# Patient Record
Sex: FEMALE | Race: WHITE | Employment: OTHER | URBAN - METROPOLITAN AREA
[De-identification: names, ages, dates, MRNs, and addresses within clinical notes are randomized per-mention and may not be internally consistent; named-entity substitution may affect disease eponyms.]

---

## 2019-01-24 PROBLEM — N18.4 STAGE 4 CHRONIC KIDNEY DISEASE (HCC): Status: ACTIVE | Noted: 2019-01-24

## 2019-01-24 PROBLEM — E66.01 SEVERE OBESITY (HCC): Status: ACTIVE | Noted: 2019-01-24

## 2019-01-28 ENCOUNTER — HOSPITAL ENCOUNTER (OUTPATIENT)
Dept: SURGERY | Age: 84
Discharge: HOME | End: 2019-01-28
Payer: MEDICARE

## 2019-01-28 VITALS
HEIGHT: 61 IN | TEMPERATURE: 98.1 F | DIASTOLIC BLOOD PRESSURE: 57 MMHG | OXYGEN SATURATION: 97 % | BODY MASS INDEX: 46.27 KG/M2 | SYSTOLIC BLOOD PRESSURE: 156 MMHG | RESPIRATION RATE: 18 BRPM | HEART RATE: 85 BPM | WEIGHT: 245.06 LBS

## 2019-01-28 LAB
ATRIAL RATE: 78 BPM
CALCULATED P AXIS, ECG09: 33 DEGREES
CALCULATED R AXIS, ECG10: 57 DEGREES
CALCULATED T AXIS, ECG11: 67 DEGREES
CREAT SERPL-MCNC: 3.33 MG/DL (ref 0.6–1)
DIAGNOSIS, 93000: NORMAL
GLUCOSE BLD STRIP.AUTO-MCNC: 95 MG/DL (ref 65–100)
HGB BLD-MCNC: 8.9 G/DL (ref 11.7–15.4)
P-R INTERVAL, ECG05: 168 MS
POTASSIUM SERPL-SCNC: 3.4 MMOL/L (ref 3.5–5.1)
Q-T INTERVAL, ECG07: 440 MS
QRS DURATION, ECG06: 94 MS
QTC CALCULATION (BEZET), ECG08: 501 MS
VENTRICULAR RATE, ECG03: 78 BPM

## 2019-01-28 PROCEDURE — 93005 ELECTROCARDIOGRAM TRACING: CPT | Performed by: ANESTHESIOLOGY

## 2019-01-28 PROCEDURE — 85018 HEMOGLOBIN: CPT

## 2019-01-28 PROCEDURE — 82565 ASSAY OF CREATININE: CPT

## 2019-01-28 PROCEDURE — 82962 GLUCOSE BLOOD TEST: CPT

## 2019-01-28 PROCEDURE — 84132 ASSAY OF SERUM POTASSIUM: CPT

## 2019-01-28 NOTE — PERIOP NOTES
Lab results reviewed and routed to surgeon per anesthesia protocol. Recent Results (from the past 12 hour(s)) GLUCOSE, POC Collection Time: 01/28/19 11:37 AM  
Result Value Ref Range Glucose (POC) 95 65 - 100 mg/dL POTASSIUM Collection Time: 01/28/19 11:41 AM  
Result Value Ref Range Potassium 3.4 (L) 3.5 - 5.1 mmol/L  
HEMOGLOBIN Collection Time: 01/28/19 11:41 AM  
Result Value Ref Range HGB 8.9 (L) 11.7 - 15.4 g/dL CREATININE Collection Time: 01/28/19 11:41 AM  
Result Value Ref Range  Creatinine 3.33 (H) 0.6 - 1.0 MG/DL

## 2019-01-28 NOTE — PERIOP NOTES
Patient verified name and . Patient provided medical/health information and PTA medications to the best of their ability. TYPE  CASE:ll 
Order for consent  found in EHR and matches case posting. Labs per surgeon:None. Labs per anesthesia protocol: Potassium, Creatinine, HGB, POC Glucose. EKG  :  Today NSR Poc Glucose: 95 Patient provided with and instructed on education handouts including Guide to Surgery, blood transfusions, pain management, and hand hygiene for the family and community, and [facility] brochure. Antibacterial soap and Hibiclens instructions given per hospital policy. Instructed patient to continue previous medications as prescribed prior to surgery unless otherwise directed and to take the following medications the day of surgery according to anesthesia guidelines : Albuterol inhaler, ASA 81 mg, Zyrtec, Breo inhaler, Ativan, Singulair, Omeprazole, Incruse inhaler . Instructed patient to hold  the following medications: Vitamins. Patient states and also brought paper showing to hold Eliquis x 3 days starting 2019 in chart. Original medication prescription bottles were not visualized during patient appointment. Patient teach back successful and patient demonstrates knowledge of instruction.

## 2019-01-29 NOTE — PERIOP NOTES
Left voice mail message for [facility] at Dr. Rani Frazier office to follow up for request for Eliquis hold note.

## 2019-01-29 NOTE — PERIOP NOTES
Received faxed copy of Eliquis hold note from [facility] at Dr. Rani Frazier office. Placed on chart. She will try to obtain more legible note.

## 2019-01-30 ENCOUNTER — ANESTHESIA EVENT (OUTPATIENT)
Dept: SURGERY | Age: 84
End: 2019-01-30
Payer: MEDICARE

## 2019-01-31 ENCOUNTER — HOSPITAL ENCOUNTER (OUTPATIENT)
Age: 84
Setting detail: OUTPATIENT SURGERY
Discharge: HOME | End: 2019-01-31
Attending: SURGERY | Admitting: SURGERY
Payer: MEDICARE

## 2019-01-31 ENCOUNTER — ANESTHESIA (OUTPATIENT)
Dept: SURGERY | Age: 84
End: 2019-01-31
Payer: MEDICARE

## 2019-01-31 VITALS
HEIGHT: 61 IN | OXYGEN SATURATION: 90 % | DIASTOLIC BLOOD PRESSURE: 62 MMHG | TEMPERATURE: 97.3 F | BODY MASS INDEX: 46.26 KG/M2 | SYSTOLIC BLOOD PRESSURE: 139 MMHG | HEART RATE: 79 BPM | WEIGHT: 245 LBS | RESPIRATION RATE: 14 BRPM

## 2019-01-31 LAB
GLUCOSE BLD STRIP.AUTO-MCNC: 125 MG/DL (ref 65–100)
POTASSIUM BLD-SCNC: 3.5 MMOL/L (ref 3.5–5.1)

## 2019-01-31 PROCEDURE — 77030010509 HC AIRWY LMA MSK TELE -A: Performed by: ANESTHESIOLOGY

## 2019-01-31 PROCEDURE — 76210000006 HC OR PH I REC 0.5 TO 1 HR: Performed by: SURGERY

## 2019-01-31 PROCEDURE — 74011250637 HC RX REV CODE- 250/637: Performed by: ANESTHESIOLOGY

## 2019-01-31 PROCEDURE — 74011250636 HC RX REV CODE- 250/636

## 2019-01-31 PROCEDURE — 77030002996 HC SUT SLK J&J -A: Performed by: SURGERY

## 2019-01-31 PROCEDURE — 76010000222 HC CV SURG 2 TO 2.5 HR INTENSV-TIER 1: Performed by: SURGERY

## 2019-01-31 PROCEDURE — 76210000021 HC REC RM PH II 0.5 TO 1 HR: Performed by: SURGERY

## 2019-01-31 PROCEDURE — 77030031139 HC SUT VCRL2 J&J -A: Performed by: SURGERY

## 2019-01-31 PROCEDURE — 84132 ASSAY OF SERUM POTASSIUM: CPT

## 2019-01-31 PROCEDURE — 74011250636 HC RX REV CODE- 250/636: Performed by: ANESTHESIOLOGY

## 2019-01-31 PROCEDURE — C1768 GRAFT, VASCULAR: HCPCS | Performed by: SURGERY

## 2019-01-31 PROCEDURE — 82962 GLUCOSE BLOOD TEST: CPT

## 2019-01-31 PROCEDURE — 77030034888 HC SUT PROL 2 J&J -B: Performed by: SURGERY

## 2019-01-31 PROCEDURE — 74011250636 HC RX REV CODE- 250/636: Performed by: SURGERY

## 2019-01-31 PROCEDURE — 76060000035 HC ANESTHESIA 2 TO 2.5 HR: Performed by: SURGERY

## 2019-01-31 PROCEDURE — 77030018673: Performed by: SURGERY

## 2019-01-31 PROCEDURE — 77030002987 HC SUT PROL J&J -B: Performed by: SURGERY

## 2019-01-31 DEVICE — VG 6MM X 40CM LINED
Type: IMPLANTABLE DEVICE | Site: ARM | Status: FUNCTIONAL
Brand: GORE-TEX   VASCULAR GRAFT

## 2019-01-31 RX ORDER — HEPARIN SODIUM 1000 [USP'U]/ML
INJECTION, SOLUTION INTRAVENOUS; SUBCUTANEOUS AS NEEDED
Status: DISCONTINUED | OUTPATIENT
Start: 2019-01-31 | End: 2019-01-31 | Stop reason: HOSPADM

## 2019-01-31 RX ORDER — HEPARIN SODIUM 5000 [USP'U]/ML
INJECTION, SOLUTION INTRAVENOUS; SUBCUTANEOUS AS NEEDED
Status: DISCONTINUED | OUTPATIENT
Start: 2019-01-31 | End: 2019-01-31 | Stop reason: HOSPADM

## 2019-01-31 RX ORDER — SODIUM CHLORIDE 0.9 % (FLUSH) 0.9 %
5-40 SYRINGE (ML) INJECTION EVERY 8 HOURS
Status: DISCONTINUED | OUTPATIENT
Start: 2019-01-31 | End: 2019-01-31 | Stop reason: HOSPADM

## 2019-01-31 RX ORDER — OXYCODONE HYDROCHLORIDE 5 MG/1
5 TABLET ORAL
Status: DISCONTINUED | OUTPATIENT
Start: 2019-01-31 | End: 2019-01-31 | Stop reason: HOSPADM

## 2019-01-31 RX ORDER — PROPOFOL 10 MG/ML
INJECTION, EMULSION INTRAVENOUS AS NEEDED
Status: DISCONTINUED | OUTPATIENT
Start: 2019-01-31 | End: 2019-01-31 | Stop reason: HOSPADM

## 2019-01-31 RX ORDER — SODIUM CHLORIDE, SODIUM LACTATE, POTASSIUM CHLORIDE, CALCIUM CHLORIDE 600; 310; 30; 20 MG/100ML; MG/100ML; MG/100ML; MG/100ML
75 INJECTION, SOLUTION INTRAVENOUS CONTINUOUS
Status: DISCONTINUED | OUTPATIENT
Start: 2019-01-31 | End: 2019-01-31 | Stop reason: HOSPADM

## 2019-01-31 RX ORDER — FENTANYL CITRATE 50 UG/ML
INJECTION, SOLUTION INTRAMUSCULAR; INTRAVENOUS AS NEEDED
Status: DISCONTINUED | OUTPATIENT
Start: 2019-01-31 | End: 2019-01-31 | Stop reason: HOSPADM

## 2019-01-31 RX ORDER — SODIUM CHLORIDE 9 MG/ML
INJECTION, SOLUTION INTRAVENOUS
Status: DISCONTINUED | OUTPATIENT
Start: 2019-01-31 | End: 2019-01-31 | Stop reason: HOSPADM

## 2019-01-31 RX ORDER — OXYCODONE AND ACETAMINOPHEN 10; 325 MG/1; MG/1
1 TABLET ORAL AS NEEDED
Status: DISCONTINUED | OUTPATIENT
Start: 2019-01-31 | End: 2019-01-31 | Stop reason: HOSPADM

## 2019-01-31 RX ORDER — PROTAMINE SULFATE 10 MG/ML
INJECTION, SOLUTION INTRAVENOUS AS NEEDED
Status: DISCONTINUED | OUTPATIENT
Start: 2019-01-31 | End: 2019-01-31 | Stop reason: HOSPADM

## 2019-01-31 RX ORDER — SODIUM CHLORIDE 0.9 % (FLUSH) 0.9 %
5-40 SYRINGE (ML) INJECTION AS NEEDED
Status: DISCONTINUED | OUTPATIENT
Start: 2019-01-31 | End: 2019-01-31 | Stop reason: HOSPADM

## 2019-01-31 RX ORDER — LIDOCAINE HYDROCHLORIDE 20 MG/ML
INJECTION, SOLUTION EPIDURAL; INFILTRATION; INTRACAUDAL; PERINEURAL AS NEEDED
Status: DISCONTINUED | OUTPATIENT
Start: 2019-01-31 | End: 2019-01-31 | Stop reason: HOSPADM

## 2019-01-31 RX ORDER — ACETAMINOPHEN 500 MG
1000 TABLET ORAL ONCE
Status: COMPLETED | OUTPATIENT
Start: 2019-01-31 | End: 2019-01-31

## 2019-01-31 RX ORDER — ONDANSETRON 2 MG/ML
INJECTION INTRAMUSCULAR; INTRAVENOUS AS NEEDED
Status: DISCONTINUED | OUTPATIENT
Start: 2019-01-31 | End: 2019-01-31 | Stop reason: HOSPADM

## 2019-01-31 RX ORDER — HYDROMORPHONE HYDROCHLORIDE 2 MG/ML
0.5 INJECTION, SOLUTION INTRAMUSCULAR; INTRAVENOUS; SUBCUTANEOUS
Status: DISCONTINUED | OUTPATIENT
Start: 2019-01-31 | End: 2019-01-31 | Stop reason: HOSPADM

## 2019-01-31 RX ORDER — TRAMADOL HYDROCHLORIDE 50 MG/1
50 TABLET ORAL
Status: DISCONTINUED | OUTPATIENT
Start: 2019-01-31 | End: 2019-01-31 | Stop reason: HOSPADM

## 2019-01-31 RX ORDER — PROPOFOL 10 MG/ML
INJECTION, EMULSION INTRAVENOUS
Status: DISCONTINUED | OUTPATIENT
Start: 2019-01-31 | End: 2019-01-31 | Stop reason: HOSPADM

## 2019-01-31 RX ORDER — CEFAZOLIN SODIUM/WATER 2 G/20 ML
2 SYRINGE (ML) INTRAVENOUS ONCE
Status: COMPLETED | OUTPATIENT
Start: 2019-01-31 | End: 2019-01-31

## 2019-01-31 RX ADMIN — PROTAMINE SULFATE 10 MG: 10 INJECTION, SOLUTION INTRAVENOUS at 15:12

## 2019-01-31 RX ADMIN — SODIUM CHLORIDE, SODIUM LACTATE, POTASSIUM CHLORIDE, AND CALCIUM CHLORIDE 75 ML/HR: 600; 310; 30; 20 INJECTION, SOLUTION INTRAVENOUS at 10:56

## 2019-01-31 RX ADMIN — PROPOFOL 20 MG: 10 INJECTION, EMULSION INTRAVENOUS at 14:26

## 2019-01-31 RX ADMIN — ACETAMINOPHEN 1000 MG: 500 TABLET, FILM COATED ORAL at 17:15

## 2019-01-31 RX ADMIN — FENTANYL CITRATE 12.5 MCG: 50 INJECTION, SOLUTION INTRAMUSCULAR; INTRAVENOUS at 14:10

## 2019-01-31 RX ADMIN — FENTANYL CITRATE 25 MCG: 50 INJECTION, SOLUTION INTRAMUSCULAR; INTRAVENOUS at 14:53

## 2019-01-31 RX ADMIN — SODIUM CHLORIDE: 9 INJECTION, SOLUTION INTRAVENOUS at 14:30

## 2019-01-31 RX ADMIN — PROPOFOL 140 MCG/KG/MIN: 10 INJECTION, EMULSION INTRAVENOUS at 13:35

## 2019-01-31 RX ADMIN — FENTANYL CITRATE 25 MCG: 50 INJECTION, SOLUTION INTRAMUSCULAR; INTRAVENOUS at 15:06

## 2019-01-31 RX ADMIN — HEPARIN SODIUM 4000 UNITS: 1000 INJECTION, SOLUTION INTRAVENOUS; SUBCUTANEOUS at 14:33

## 2019-01-31 RX ADMIN — FENTANYL CITRATE 12.5 MCG: 50 INJECTION, SOLUTION INTRAMUSCULAR; INTRAVENOUS at 14:26

## 2019-01-31 RX ADMIN — PROPOFOL 30 MG: 10 INJECTION, EMULSION INTRAVENOUS at 13:35

## 2019-01-31 RX ADMIN — FENTANYL CITRATE 12.5 MCG: 50 INJECTION, SOLUTION INTRAMUSCULAR; INTRAVENOUS at 14:08

## 2019-01-31 RX ADMIN — PROPOFOL 20 MG: 10 INJECTION, EMULSION INTRAVENOUS at 13:53

## 2019-01-31 RX ADMIN — ONDANSETRON 4 MG: 2 INJECTION INTRAMUSCULAR; INTRAVENOUS at 14:15

## 2019-01-31 RX ADMIN — LIDOCAINE HYDROCHLORIDE 40 MG: 20 INJECTION, SOLUTION EPIDURAL; INFILTRATION; INTRACAUDAL; PERINEURAL at 13:35

## 2019-01-31 RX ADMIN — Medication 2 G: at 13:24

## 2019-01-31 RX ADMIN — PROTAMINE SULFATE 10 MG: 10 INJECTION, SOLUTION INTRAVENOUS at 15:14

## 2019-01-31 NOTE — ANESTHESIA POSTPROCEDURE EVALUATION
Procedure(s): LEFT ARM ARTERIO VENOUS GRAFT INSERTION. Anesthesia Post Evaluation Multimodal analgesia: multimodal analgesia used between 6 hours prior to anesthesia start to PACU discharge Patient location during evaluation: bedside Patient participation: complete - patient participated Level of consciousness: awake and responsive to light touch Pain management: adequate Airway patency: patent Anesthetic complications: no 
Cardiovascular status: acceptable, hemodynamically stable, blood pressure returned to baseline and stable Respiratory status: acceptable, unassisted, spontaneous ventilation and nonlabored ventilation Hydration status: acceptable Post anesthesia nausea and vomiting:  controlled Visit Vitals /77 Pulse 76 Temp 36.6 °C (97.8 °F) Resp 14 Ht 5' 1\" (1.549 m) Wt 111.1 kg (245 lb) SpO2 95% BMI 46.29 kg/m²

## 2019-01-31 NOTE — ANESTHESIA PREPROCEDURE EVALUATION
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 
  
GERD: well controlled Endo/Other Diabetes: type 2 Morbid obesity and arthritis Other Findings Physical Exam 
 
Airway Mallampati: III 
TM Distance: > 6 cm Neck ROM: normal range of motion Mouth opening: Diminished (comment) Cardiovascular Rhythm: regular Dental 
 
Dentition: Full upper dentures and Full lower dentures Pulmonary Abdominal 
GI exam deferred Other Findings Anesthetic Plan ASA: 4 Anesthesia type: total IV anesthesia and general - backup Induction: Intravenous Anesthetic plan and risks discussed with: Patient

## 2019-01-31 NOTE — BRIEF OP NOTE
BRIEF OPERATIVE NOTE Date of Procedure: 1/31/2019 Preoperative Diagnosis: End stage renal disease (Dhara Colder) [N18.6] Postoperative Diagnosis: End stage renal disease (Hdara Colder) [N18.6] Procedure(s): LEFT ARM ARTERIO VENOUS GRAFT INSERTION Surgeon(s) and Role: Delmer Lee, Cortes Willett MD - Primary Surgical Assistant:  
 
Surgical Staff: 
Circ-1: Merline Brave, RN 
Circ-Relief: Denise Gordon RN Scrub Tech-1: Apple Hawkins Scrub Tech-2: Scar Arriaza; Heidi Long Event Time In Time Out Incision Start [phone] Incision Close 1530 Anesthesia: General  
Estimated Blood Loss: 25cc Specimens: * No specimens in log * Findings:   
Complications: None Implants:  
Implant Name Type Inv. Item Serial No.  Lot No. LRB No. Used Action GRAFT VASC N-S STD [id number] -- [employer] - [id number]  GRAFT VASC N-S STD [id number] -- Cristi Burnette [id number]  [employer]  Left 1 Implanted

## 2019-02-01 NOTE — OP NOTES
[facility] REPORT    Jessie Atkinson  MR#: [id number]  : 1935  ACCOUNT #: [de-identified]   DATE OF SERVICE: 2019    PREOPERATIVE DIAGNOSIS:  End-stage renal disease. POSTOPERATIVE DIAGNOSIS:  End-stage renal disease. PROCEDURE PERFORMED:  Left arteriovenous graft. SURGEON:  Bautista Pisano MD     ASSISTANT:  None. ANESTHESIA:  IV sedation plus 1% lidocaine as local.    ESTIMATED BLOOD LOSS:  25 mL. DRAINS:  None. SPECIMENS REMOVED:  None. COMPLICATIONS:  None. IMPLANTS:  See chart. DESCRIPTION OF PROCEDURE:  The patient was brought to the operating room and placed on the operative table in supine position. Following adequate IV sedation and a timeout procedure, the left arm was draped and prepped in sterile fashion circumferentially. A transverse incision was made just below the antecubital fossa in the anterior forearm. The wound was deepened using Bovie to control bleeding. Dissection was carried downward to the level of the median cubital vein, which was found to be of excellent caliber and quality and was encircled with a vessel loop proximally and distally. Next, dissection was carried downward to the level of the aponeurosis, which was incised, exposing the brachial artery at this level. The brachial, ulnar and radial arteries were identified, mobilized and encircled with vessel loops in Clayton fashion. Next, a small counter incision was made on the distal forearm. A 6 mm PTFE graft was then tunneled in a loop fashion in the subcutaneous plane. The patient was then systemically heparinized. Next, the loops around the artery were tightened to occlude flow. A longitudinal arteriotomy was performed. The PTFE graft was then spatulated and sewn into position in end-to-side fashion with running 5-0 Prolene suture.   Following completion of the anastomosis, a clamp was placed on the PTFE graft through the counterincision. The loops around the artery were then released, restoring flow. A palpable radial pulse was noted. Next, the loops around the median cubital vein were tightened to occlude flow. A longitudinal venotomy was performed. The PTFE graft was then trimmed to the appropriate length, spatulated, and sewn into position in end-to-side fashion with running 5-0 Prolene suture. Prior to completion of the anastomosis, the graft was flushed and the native vessels were backbled. The anastomosis then completed and flow was restored. At this point, there was an excellent thrill in the graft. Protamine was administered to reverse heparin effect. Hemostasis was achieved and the wounds were closed in layers with Vicryl suture. Sterile dry dressings were applied. The patient was awakened from anesthesia and transported to the recovery room in stable condition. The patient tolerated the procedure well. No complications. All needle and sponge counts were reported as correct.       Mary Flores MD       Kit Jatinder / PARKER  D: 02/01/2019 09:13     T: 02/01/2019 09:58  JOB #: [id number]

## 2019-02-06 ENCOUNTER — APPOINTMENT (OUTPATIENT)
Dept: GENERAL RADIOLOGY | Age: 84
DRG: 193 | End: 2019-02-06
Attending: EMERGENCY MEDICINE
Payer: MEDICARE

## 2019-02-06 ENCOUNTER — HOSPITAL ENCOUNTER (INPATIENT)
Age: 84
LOS: 2 days | Discharge: HOME HEALTH | DRG: 193 | End: 2019-02-13
Attending: EMERGENCY MEDICINE | Admitting: HOSPITALIST
Payer: MEDICARE

## 2019-02-06 DIAGNOSIS — N18.4 STAGE 4 CHRONIC KIDNEY DISEASE (HCC): ICD-10-CM

## 2019-02-06 DIAGNOSIS — L03.114 CELLULITIS OF LEFT UPPER EXTREMITY: ICD-10-CM

## 2019-02-06 DIAGNOSIS — J44.1 COPD EXACERBATION (HCC): ICD-10-CM

## 2019-02-06 DIAGNOSIS — R91.8 PULMONARY INFILTRATE IN RIGHT LUNG ON CHEST X-RAY: ICD-10-CM

## 2019-02-06 DIAGNOSIS — J96.21 ACUTE ON CHRONIC RESPIRATORY FAILURE WITH HYPOXIA (HCC): ICD-10-CM

## 2019-02-06 DIAGNOSIS — R91.8 RIGHT LOWER LOBE PULMONARY INFILTRATE: ICD-10-CM

## 2019-02-06 DIAGNOSIS — G47.33 OSA (OBSTRUCTIVE SLEEP APNEA): ICD-10-CM

## 2019-02-06 DIAGNOSIS — R09.02 HYPOXEMIA: ICD-10-CM

## 2019-02-06 DIAGNOSIS — E66.01 SEVERE OBESITY (HCC): ICD-10-CM

## 2019-02-06 DIAGNOSIS — R06.02 SOB (SHORTNESS OF BREATH): Primary | ICD-10-CM

## 2019-02-06 DIAGNOSIS — J44.1 ACUTE EXACERBATION OF CHRONIC OBSTRUCTIVE PULMONARY DISEASE (COPD) (HCC): ICD-10-CM

## 2019-02-06 DIAGNOSIS — N18.9 ANEMIA OF RENAL DISEASE: ICD-10-CM

## 2019-02-06 DIAGNOSIS — D63.1 ANEMIA OF RENAL DISEASE: ICD-10-CM

## 2019-02-06 PROBLEM — L03.90 CELLULITIS: Status: ACTIVE | Noted: 2019-02-06

## 2019-02-06 PROBLEM — D64.9 ANEMIA: Status: ACTIVE | Noted: 2019-02-06

## 2019-02-06 PROBLEM — J18.9 COMMUNITY ACQUIRED PNEUMONIA: Status: ACTIVE | Noted: 2019-02-06

## 2019-02-06 LAB
ALBUMIN SERPL-MCNC: 3 G/DL (ref 3.2–4.6)
ALBUMIN/GLOB SERPL: 0.8 {RATIO}
ALP SERPL-CCNC: 82 U/L (ref 50–130)
ALT SERPL-CCNC: 20 U/L (ref 12–65)
ANION GAP SERPL CALC-SCNC: 12 MMOL/L
AST SERPL-CCNC: 19 U/L (ref 15–37)
ATRIAL RATE: 88 BPM
BASOPHILS # BLD: 0 K/UL (ref 0–0.2)
BASOPHILS NFR BLD: 0 % (ref 0–2)
BILIRUB SERPL-MCNC: 0.3 MG/DL (ref 0.2–1.1)
BNP SERPL-MCNC: 510 PG/ML
BUN SERPL-MCNC: 54 MG/DL (ref 8–23)
CALCIUM SERPL-MCNC: 8.3 MG/DL (ref 8.3–10.4)
CALCULATED P AXIS, ECG09: 21 DEGREES
CALCULATED R AXIS, ECG10: 44 DEGREES
CALCULATED T AXIS, ECG11: 78 DEGREES
CHLORIDE SERPL-SCNC: 103 MMOL/L (ref 98–107)
CO2 SERPL-SCNC: 27 MMOL/L (ref 21–32)
CREAT SERPL-MCNC: 3.09 MG/DL (ref 0.6–1)
DIAGNOSIS, 93000: NORMAL
DIFFERENTIAL METHOD BLD: ABNORMAL
EOSINOPHIL # BLD: 0.2 K/UL (ref 0–0.8)
EOSINOPHIL NFR BLD: 2 % (ref 0.5–7.8)
ERYTHROCYTE [DISTWIDTH] IN BLOOD BY AUTOMATED COUNT: 16.8 % (ref 11.9–14.6)
EST. AVERAGE GLUCOSE BLD GHB EST-MCNC: 177 MG/DL
FERRITIN SERPL-MCNC: 94 NG/ML (ref 8–388)
GLOBULIN SER CALC-MCNC: 4 G/DL (ref 2.3–3.5)
GLUCOSE BLD STRIP.AUTO-MCNC: 436 MG/DL (ref 65–100)
GLUCOSE BLD STRIP.AUTO-MCNC: 455 MG/DL (ref 65–100)
GLUCOSE SERPL-MCNC: 159 MG/DL (ref 65–100)
HBA1C MFR BLD: 7.8 %
HCT VFR BLD AUTO: 24.8 % (ref 35.8–46.3)
HGB BLD-MCNC: 7.6 G/DL (ref 11.7–15.4)
IMM GRANULOCYTES # BLD AUTO: 0 K/UL (ref 0–0.5)
IMM GRANULOCYTES NFR BLD AUTO: 0 % (ref 0–5)
IRON SATN MFR SERPL: 4 %
IRON SERPL-MCNC: 13 UG/DL
LACTATE BLD-SCNC: 1.08 MMOL/L (ref 0.5–1.9)
LYMPHOCYTES # BLD: 1 K/UL (ref 0.5–4.6)
LYMPHOCYTES NFR BLD: 10 % (ref 13–44)
MCH RBC QN AUTO: 29.8 PG (ref 26.1–32.9)
MCHC RBC AUTO-ENTMCNC: 30.6 G/DL (ref 31.4–35)
MCV RBC AUTO: 97.3 FL (ref 79.6–97.8)
MONOCYTES # BLD: 0.7 K/UL (ref 0.1–1.3)
MONOCYTES NFR BLD: 7 % (ref 4–12)
NEUTS SEG # BLD: 8.5 K/UL (ref 1.7–8.2)
NEUTS SEG NFR BLD: 81 % (ref 43–78)
NRBC # BLD: 0 K/UL (ref 0–0.2)
P-R INTERVAL, ECG05: 168 MS
PLATELET # BLD AUTO: 270 K/UL (ref 150–450)
PMV BLD AUTO: 11.3 FL (ref 9.4–12.3)
POTASSIUM SERPL-SCNC: 4.2 MMOL/L (ref 3.5–5.1)
PROT SERPL-MCNC: 7 G/DL
Q-T INTERVAL, ECG07: 404 MS
QRS DURATION, ECG06: 96 MS
QTC CALCULATION (BEZET), ECG08: 488 MS
RBC # BLD AUTO: 2.55 M/UL (ref 4.05–5.2)
SODIUM SERPL-SCNC: 142 MMOL/L (ref 136–145)
TIBC SERPL-MCNC: 292 UG/DL (ref 250–450)
TRANSFERRIN SERPL-MCNC: 226 MG/DL (ref 202–364)
TROPONIN I SERPL-MCNC: <0.02 NG/ML (ref 0.02–0.05)
VENTRICULAR RATE, ECG03: 88 BPM
WBC # BLD AUTO: 10.5 K/UL (ref 4.3–11.1)

## 2019-02-06 PROCEDURE — 74011636637 HC RX REV CODE- 636/637: Performed by: INTERNAL MEDICINE

## 2019-02-06 PROCEDURE — 83605 ASSAY OF LACTIC ACID: CPT

## 2019-02-06 PROCEDURE — 84484 ASSAY OF TROPONIN QUANT: CPT

## 2019-02-06 PROCEDURE — 71045 X-RAY EXAM CHEST 1 VIEW: CPT

## 2019-02-06 PROCEDURE — 74011250636 HC RX REV CODE- 250/636: Performed by: EMERGENCY MEDICINE

## 2019-02-06 PROCEDURE — 74011000250 HC RX REV CODE- 250: Performed by: INTERNAL MEDICINE

## 2019-02-06 PROCEDURE — 94640 AIRWAY INHALATION TREATMENT: CPT

## 2019-02-06 PROCEDURE — 99285 EMERGENCY DEPT VISIT HI MDM: CPT | Performed by: EMERGENCY MEDICINE

## 2019-02-06 PROCEDURE — 83036 HEMOGLOBIN GLYCOSYLATED A1C: CPT

## 2019-02-06 PROCEDURE — 93005 ELECTROCARDIOGRAM TRACING: CPT | Performed by: EMERGENCY MEDICINE

## 2019-02-06 PROCEDURE — 96365 THER/PROPH/DIAG IV INF INIT: CPT | Performed by: EMERGENCY MEDICINE

## 2019-02-06 PROCEDURE — 82728 ASSAY OF FERRITIN: CPT

## 2019-02-06 PROCEDURE — 74011000250 HC RX REV CODE- 250: Performed by: EMERGENCY MEDICINE

## 2019-02-06 PROCEDURE — 74011250637 HC RX REV CODE- 250/637: Performed by: INTERNAL MEDICINE

## 2019-02-06 PROCEDURE — 83880 ASSAY OF NATRIURETIC PEPTIDE: CPT

## 2019-02-06 PROCEDURE — 87040 BLOOD CULTURE FOR BACTERIA: CPT

## 2019-02-06 PROCEDURE — 65270000029 HC RM PRIVATE

## 2019-02-06 PROCEDURE — 74011000258 HC RX REV CODE- 258: Performed by: EMERGENCY MEDICINE

## 2019-02-06 PROCEDURE — 83540 ASSAY OF IRON: CPT

## 2019-02-06 PROCEDURE — 77010033678 HC OXYGEN DAILY

## 2019-02-06 PROCEDURE — 80053 COMPREHEN METABOLIC PANEL: CPT

## 2019-02-06 PROCEDURE — 94644 CONT INHLJ TX 1ST HOUR: CPT

## 2019-02-06 PROCEDURE — 82962 GLUCOSE BLOOD TEST: CPT

## 2019-02-06 PROCEDURE — 74011000258 HC RX REV CODE- 258: Performed by: INTERNAL MEDICINE

## 2019-02-06 PROCEDURE — 85025 COMPLETE CBC W/AUTO DIFF WBC: CPT

## 2019-02-06 PROCEDURE — 74011250636 HC RX REV CODE- 250/636: Performed by: INTERNAL MEDICINE

## 2019-02-06 RX ORDER — IPRATROPIUM BROMIDE AND ALBUTEROL SULFATE 2.5; .5 MG/3ML; MG/3ML
3 SOLUTION RESPIRATORY (INHALATION)
Status: DISCONTINUED | OUTPATIENT
Start: 2019-02-06 | End: 2019-02-07

## 2019-02-06 RX ORDER — TRAMADOL HYDROCHLORIDE 50 MG/1
50 TABLET ORAL
Status: DISCONTINUED | OUTPATIENT
Start: 2019-02-06 | End: 2019-02-13 | Stop reason: HOSPADM

## 2019-02-06 RX ORDER — LORATADINE 10 MG/1
10 TABLET ORAL DAILY
Status: DISCONTINUED | OUTPATIENT
Start: 2019-02-07 | End: 2019-02-13 | Stop reason: HOSPADM

## 2019-02-06 RX ORDER — LANOLIN ALCOHOL/MO/W.PET/CERES
325 CREAM (GRAM) TOPICAL
Status: DISCONTINUED | OUTPATIENT
Start: 2019-02-07 | End: 2019-02-10

## 2019-02-06 RX ORDER — LORAZEPAM 0.5 MG/1
0.5 TABLET ORAL
Status: DISCONTINUED | OUTPATIENT
Start: 2019-02-06 | End: 2019-02-13 | Stop reason: HOSPADM

## 2019-02-06 RX ORDER — PANTOPRAZOLE SODIUM 40 MG/1
40 TABLET, DELAYED RELEASE ORAL
Status: DISCONTINUED | OUTPATIENT
Start: 2019-02-07 | End: 2019-02-13 | Stop reason: HOSPADM

## 2019-02-06 RX ORDER — FLUTICASONE PROPIONATE 50 MCG
2 SPRAY, SUSPENSION (ML) NASAL DAILY
Status: DISCONTINUED | OUTPATIENT
Start: 2019-02-07 | End: 2019-02-13 | Stop reason: HOSPADM

## 2019-02-06 RX ORDER — HYDROCODONE BITARTRATE AND ACETAMINOPHEN 10; 325 MG/1; MG/1
1 TABLET ORAL
Status: DISCONTINUED | OUTPATIENT
Start: 2019-02-06 | End: 2019-02-07

## 2019-02-06 RX ORDER — VERAPAMIL HYDROCHLORIDE 240 MG/1
120 TABLET, FILM COATED, EXTENDED RELEASE ORAL
Status: DISCONTINUED | OUTPATIENT
Start: 2019-02-06 | End: 2019-02-10

## 2019-02-06 RX ORDER — ATORVASTATIN CALCIUM 10 MG/1
40 TABLET, FILM COATED ORAL DAILY
Status: DISCONTINUED | OUTPATIENT
Start: 2019-02-07 | End: 2019-02-06 | Stop reason: SDUPTHER

## 2019-02-06 RX ORDER — SODIUM CHLORIDE 0.9 % (FLUSH) 0.9 %
5-40 SYRINGE (ML) INJECTION AS NEEDED
Status: DISCONTINUED | OUTPATIENT
Start: 2019-02-06 | End: 2019-02-13 | Stop reason: HOSPADM

## 2019-02-06 RX ORDER — ASPIRIN 81 MG/1
81 TABLET ORAL DAILY
Status: DISCONTINUED | OUTPATIENT
Start: 2019-02-07 | End: 2019-02-13 | Stop reason: HOSPADM

## 2019-02-06 RX ORDER — POTASSIUM CHLORIDE 750 MG/1
10 TABLET, EXTENDED RELEASE ORAL DAILY
Status: DISCONTINUED | OUTPATIENT
Start: 2019-02-07 | End: 2019-02-13 | Stop reason: HOSPADM

## 2019-02-06 RX ORDER — SODIUM CHLORIDE 0.9 % (FLUSH) 0.9 %
5-40 SYRINGE (ML) INJECTION EVERY 8 HOURS
Status: DISCONTINUED | OUTPATIENT
Start: 2019-02-06 | End: 2019-02-13 | Stop reason: HOSPADM

## 2019-02-06 RX ORDER — ALLOPURINOL 100 MG/1
100 TABLET ORAL DAILY
Status: DISCONTINUED | OUTPATIENT
Start: 2019-02-07 | End: 2019-02-13 | Stop reason: HOSPADM

## 2019-02-06 RX ORDER — FUROSEMIDE 40 MG/1
40 TABLET ORAL DAILY
Status: DISCONTINUED | OUTPATIENT
Start: 2019-02-07 | End: 2019-02-13 | Stop reason: HOSPADM

## 2019-02-06 RX ORDER — ATORVASTATIN CALCIUM 40 MG/1
40 TABLET, FILM COATED ORAL
Status: DISCONTINUED | OUTPATIENT
Start: 2019-02-06 | End: 2019-02-13 | Stop reason: HOSPADM

## 2019-02-06 RX ORDER — ALBUTEROL SULFATE 0.83 MG/ML
10 SOLUTION RESPIRATORY (INHALATION)
Status: COMPLETED | OUTPATIENT
Start: 2019-02-06 | End: 2019-02-06

## 2019-02-06 RX ORDER — GUAIFENESIN 600 MG/1
600 TABLET, EXTENDED RELEASE ORAL EVERY 12 HOURS
Status: DISCONTINUED | OUTPATIENT
Start: 2019-02-06 | End: 2019-02-13 | Stop reason: HOSPADM

## 2019-02-06 RX ORDER — MONTELUKAST SODIUM 10 MG/1
10 TABLET ORAL
Status: DISCONTINUED | OUTPATIENT
Start: 2019-02-06 | End: 2019-02-13 | Stop reason: HOSPADM

## 2019-02-06 RX ORDER — INSULIN LISPRO 100 [IU]/ML
INJECTION, SOLUTION INTRAVENOUS; SUBCUTANEOUS
Status: DISCONTINUED | OUTPATIENT
Start: 2019-02-06 | End: 2019-02-13 | Stop reason: HOSPADM

## 2019-02-06 RX ORDER — BUDESONIDE 0.5 MG/2ML
500 INHALANT ORAL
Status: DISCONTINUED | OUTPATIENT
Start: 2019-02-06 | End: 2019-02-13 | Stop reason: HOSPADM

## 2019-02-06 RX ORDER — AMIODARONE HYDROCHLORIDE 200 MG/1
200 TABLET ORAL DAILY
Status: DISCONTINUED | OUTPATIENT
Start: 2019-02-07 | End: 2019-02-13 | Stop reason: HOSPADM

## 2019-02-06 RX ADMIN — BUDESONIDE 500 MCG: 0.5 INHALANT RESPIRATORY (INHALATION) at 21:31

## 2019-02-06 RX ADMIN — MONTELUKAST SODIUM 10 MG: 10 TABLET, FILM COATED ORAL at 21:00

## 2019-02-06 RX ADMIN — PIPERACILLIN SODIUM,TAZOBACTAM SODIUM 4.5 G: 4; .5 INJECTION, POWDER, FOR SOLUTION INTRAVENOUS at 12:05

## 2019-02-06 RX ADMIN — PIPERACILLIN SODIUM,TAZOBACTAM SODIUM 3.38 G: 3; .375 INJECTION, POWDER, FOR SOLUTION INTRAVENOUS at 23:36

## 2019-02-06 RX ADMIN — APIXABAN 2.5 MG: 2.5 TABLET, FILM COATED ORAL at 21:00

## 2019-02-06 RX ADMIN — ATORVASTATIN CALCIUM 40 MG: 40 TABLET, FILM COATED ORAL at 22:00

## 2019-02-06 RX ADMIN — METHYLPREDNISOLONE SODIUM SUCCINATE 40 MG: 40 INJECTION, POWDER, FOR SOLUTION INTRAMUSCULAR; INTRAVENOUS at 23:36

## 2019-02-06 RX ADMIN — ALBUTEROL SULFATE 10 MG: 2.5 SOLUTION RESPIRATORY (INHALATION) at 10:33

## 2019-02-06 RX ADMIN — VANCOMYCIN HYDROCHLORIDE 2500 MG: 10 INJECTION, POWDER, LYOPHILIZED, FOR SOLUTION INTRAVENOUS at 18:51

## 2019-02-06 RX ADMIN — VERAPAMIL HYDROCHLORIDE 120 MG: 240 TABLET, FILM COATED, EXTENDED RELEASE ORAL at 22:00

## 2019-02-06 RX ADMIN — GUAIFENESIN 600 MG: 600 TABLET, EXTENDED RELEASE ORAL at 21:00

## 2019-02-06 RX ADMIN — INSULIN LISPRO 10 UNITS: 100 INJECTION, SOLUTION INTRAVENOUS; SUBCUTANEOUS at 22:00

## 2019-02-06 RX ADMIN — IPRATROPIUM BROMIDE AND ALBUTEROL SULFATE 3 ML: .5; 3 SOLUTION RESPIRATORY (INHALATION) at 21:31

## 2019-02-06 RX ADMIN — METHYLPREDNISOLONE SODIUM SUCCINATE 40 MG: 40 INJECTION, POWDER, FOR SOLUTION INTRAMUSCULAR; INTRAVENOUS at 20:00

## 2019-02-06 RX ADMIN — INSULIN LISPRO 10 UNITS: 100 INJECTION, SOLUTION INTRAVENOUS; SUBCUTANEOUS at 18:50

## 2019-02-06 NOTE — PROGRESS NOTES
TRANSFER - IN REPORT: 
 
Verbal report received from carmine(name) on [employer]  being received from ER(unit) for routine progression of care Report consisted of patients Situation, Background, Assessment and  
Recommendations(SBAR). Information from the following report(s) SBAR was reviewed with the receiving nurse. Opportunity for questions and clarification was provided. Assessment completed upon patients arrival to unit and care assumed. ORIENTED TO ROOM. NO  DISTRESS OBSERVED. MD WAS CALLED DR SCOTT  FOR BLOOD SUGAR = [phone] U Humalog given per protocol given. NO RETURN CALL AS OF YET. No sticks in left new AV Fistula well approximated with surgical glue intact. Hourly rounding completed as per hospital protocol. Patient resting in room. No s/s of distress. Patient denies any further needs Will continue to monitor until Report handed off to on coming RN. Pain. ...controlled\ Potty. .. BSC Position. ..in bed Possessions. .. ..in pts care.

## 2019-02-06 NOTE — H&P
HOSPITALIST H&P/CONSULTNAME:  Riya Raw Age:  82 y.o. 
:   1935 MRN:   [id number] PCP: Adelfo Rice MD 
Consulting MD: Treatment Team: Primary Nurse: Vale Pelayo RN; Care Manager: Andres Denis 
HPI:  
Pt 83F with pmhx of CABG x 4, WILLIAM does not use CPAP, CHF, Afib on eliquis and amiodarone, CKD stage 5 (baseline Cr 3-4) s/p recent AVF placement on , DM2 presents with report of increasing wheeezing, cough and fever/chills for last 3-4 days. She has COPD and usually wears 2.5 L during day. Was found hypoxic at 85% on this amount. Now requiring 4lnc. Also noted to have red/warm LUE s/p recent AV graft placement CXR report RLL infiltrate. WBC 10K, hgb 7.6 (usually hgb around 8.5) Hospitalist asked to admit for COPDe and LUE cellulitis. Complete ROS done and is as stated in HPI or otherwise negative Past Medical History:  
Diagnosis Date  Adverse effect of anesthesia Combative  Anemia  Anxiety  Asthma  Atrial flutter ([address] Takes Eliquis  CAD (coronary artery disease) 2013 CABG x4  
 Cancer ([address] Uterine  Chronic kidney disease  Chronic obstructive pulmonary disease (HCC)   
 wears oxygen 2.5 liters continuous  Chronic pain Lower back  Constipation  DDD (degenerative disc disease), lumbar  Depression  Fatty liver  GERD (gastroesophageal reflux disease)  Heart abnormality  Heart failure ([address]  Hypercholesterolemia  Hypertension  Kidney disease  Morbid obesity ([address]  Nausea & vomiting  Osteoarthritis  PUD (peptic ulcer disease)  Sleep apnea   
 no CPAP, wears 2 liters oxygen at bedtime  Type 2 diabetes mellitus ([address] A1C 7.1 on 2018, , Low 80's,  Vitamin B12 deficiency Past Surgical History:  
Procedure Laterality Date  CABG, ARTERY-VEIN, FOUR  2013  HX APPENDECTOMY  HX COLONOSCOPY    
 HX GYN    
 ectopic pregnancy [address] CATHETERIZATION  2013  HX HYSTERECTOMY  HX OTHER SURGICAL  1992  
 neck spurs  HX TONSILLECTOMY  NEUROLOGICAL PROCEDURE UNLISTED    
 diskectomy  VASCULAR SURGERY PROCEDURE UNLIST Right   
 right carotidendarectomy Prior to Admission Medications Prescriptions Last Dose Informant Patient Reported? Taking? HYDROcodone-acetaminophen (NORCO)  mg tablet   Yes No  
Sig: Take 1 Tab by mouth every eight (8) hours as needed for Pain. LORazepam (ATIVAN) 1 mg tablet   Yes No  
Si.5 mg three (3) times daily as needed for Anxiety. Omeprazole delayed release (PRILOSEC D/R) 20 mg tablet   Yes No  
Sig: Take 20 mg by mouth daily. albuterol (PROVENTIL VENTOLIN) 2.5 mg /3 mL (0.083 %) nebulizer solution   Yes No  
Si.5 mg by Nebulization route every six (6) hours as needed. albuterol (VENTOLIN HFA) 90 mcg/actuation inhaler   Yes No  
Sig: Take 2 Puffs by inhalation every six (6) hours as needed for Wheezing. allopurinol (ZYLOPRIM) 100 mg tablet   Yes No  
Sig: Take 100 mg by mouth daily. amiodarone (CORDARONE) 200 mg tablet   Yes No  
Sig: Take 200 mg by mouth daily. apixaban (ELIQUIS) 2.5 mg tablet   Yes No  
Sig: Take 2.5 mg by mouth two (2) times a day. aspirin delayed-release 81 mg tablet   Yes No  
Sig: Take 81 mg by mouth daily. atorvastatin (LIPITOR) 40 mg tablet   Yes No  
Sig: Take 40 mg by mouth daily. cetirizine (ZYRTEC) 10 mg tablet   Yes No  
Sig: Take 10 mg by mouth daily. cholecalciferol (VITAMIN D3) 1,000 unit cap   Yes No  
Sig: Take 1,000 Units by mouth daily. ferrous sulfate 325 mg (65 mg iron) tablet   Yes No  
Sig: Take 325 mg by mouth Daily (before breakfast). fluticasone (FLONASE ALLERGY RELIEF) 50 mcg/actuation nasal spray   Yes No  
Si Sprays by Both Nostrils route daily. fluticasone-vilanterol (BREO ELLIPTA) 100-25 mcg/dose inhaler   Yes No  
Sig: Take 1 Puff by inhalation daily. furosemide (LASIX) 40 mg tablet   Yes No  
Sig: Take 40 mg by mouth daily. glimepiride (AMARYL) 1 mg tablet   Yes No  
Sig: Take 1 mg by mouth Daily (before breakfast). montelukast (SINGULAIR) 10 mg tablet   Yes No  
Sig: Take 10 mg by mouth daily. nitroglycerin (NITROSTAT) 0.4 mg SL tablet   Yes No  
Si.4 mg by SubLINGual route every five (5) minutes as needed for Chest Pain. Up to 3 doses. potassium chloride SA (MICRO-K) 10 mEq capsule   Yes No  
Sig: Take 10 mEq by mouth daily. traMADol (ULTRAM) 50 mg tablet   Yes No  
Sig: Take 50 mg by mouth every eight (8) hours as needed for Pain.  
umeclidinium (INCRUSE ELLIPTA) 62.5 mcg/actuation inhaler   Yes No  
Sig: Take 1 Puff by inhalation daily. verapamil SR (CALAN-SR) 240 mg CR tablet   Yes No  
Sig: Take 240 mg by mouth nightly. Takes 1/2 tab Facility-Administered Medications: None Allergies Allergen Reactions  Alprazolam Drowsiness  Demerol [Meperidine] Unknown (comments)  Levaquin [Levofloxacin] Rash  Oxycodone Unknown (comments) and Nausea and Vomiting  Pepcid [Famotidine] Nausea and Vomiting  Phenergan [Promethazine] Unknown (comments) and Other (comments) Turns red and swells  Sitagliptin Unknown (comments)  Sulfa (Sulfonamide Antibiotics) Nausea and Vomiting Social History Tobacco Use  Smoking status: Former Smoker Packs/day: 1.00 Years: 40.00 Pack years: 40.00 Last attempt to quit:  Years since quittin.1  Smokeless tobacco: Never Used Substance Use Topics  Alcohol use: No  
  
Family History Problem Relation Age of Onset  Cancer Mother   
     breast  
 Diabetes Mother  Heart Disease Mother  Diabetes Father  Heart Disease Father Objective:  
 
Visit Vitals /68 (BP 1 Location: Right arm) Pulse 94 Temp 98.7 °F (37.1 °C) Resp 28 Ht 5' 2\" (1.575 m) Wt 106.6 kg (235 lb) SpO2 93% BMI 42.98 kg/m² Temp (24hrs), Av.7 °F (37.1 °C), Min:98.7 °F (37.1 °C), Max:98.7 °F (37.1 °C) Oxygen Therapy O2 Sat (%): 93 % (19 1037) Pulse via Oximetry: 89 beats per minute (19 1037) O2 Device: Nasal cannula (19 1037) O2 Flow Rate (L/min): 4 l/min (19 1037) FIO2 (%): 93 % (19 1037) Physical Exam: 
General:    Alert, cooperative, no distress, appears stated age. Morbidly obese Head:   Normocephalic, without obvious abnormality, atraumatic. Nose:  Nares normal. No drainage or sinus tenderness. Lungs:   Diffuse exp wheezing, no rhonchi or rales Heart:   Regular rate and rhythm,  no murmur, rub or gallop. Abdomen:   Soft, non-tender. Not distended. Bowel sounds normal.  
Extremities: LUE erythema, edema and warmth. Incision site healing w/o drainage Skin:     Texture, turgor normal. No rashes or lesions. Not Jaundiced Neurologic: Alert and oriented x 3, no focal deficits Data Review:  
Recent Results (from the past 24 hour(s)) CBC WITH AUTOMATED DIFF Collection Time: 19 10:19 AM  
Result Value Ref Range WBC 10.5 4.3 - 11.1 K/uL  
 RBC 2.55 (L) 4.05 - 5.2 M/uL HGB 7.6 (L) 11.7 - 15.4 g/dL HCT 24.8 (L) 35.8 - 46.3 % MCV 97.3 79.6 - 97.8 FL  
 MCH 29.8 26.1 - 32.9 PG  
 MCHC 30.6 (L) 31.4 - 35.0 g/dL  
 RDW 16.8 (H) 11.9 - 14.6 % PLATELET 927 084 - 271 K/uL MPV 11.3 9.4 - 12.3 FL ABSOLUTE NRBC 0.00 0.0 - 0.2 K/uL  
 DF AUTOMATED NEUTROPHILS 81 (H) 43 - 78 % LYMPHOCYTES 10 (L) 13 - 44 % MONOCYTES 7 4.0 - 12.0 % EOSINOPHILS 2 0.5 - 7.8 % BASOPHILS 0 0.0 - 2.0 % IMMATURE GRANULOCYTES 0 0.0 - 5.0 %  
 ABS. NEUTROPHILS 8.5 (H) 1.7 - 8.2 K/UL  
 ABS. LYMPHOCYTES 1.0 0.5 - 4.6 K/UL  
 ABS. MONOCYTES 0.7 0.1 - 1.3 K/UL  
 ABS. EOSINOPHILS 0.2 0.0 - 0.8 K/UL  
 ABS. BASOPHILS 0.0 0.0 - 0.2 K/UL  
 ABS. IMM. GRANS. 0.0 0.0 - 0.5 K/UL METABOLIC PANEL, COMPREHENSIVE Collection Time: 19 10:19 AM  
Result Value Ref Range Sodium 142 136 - 145 mmol/L Potassium 4.2 3.5 - 5.1 mmol/L Chloride 103 98 - 107 mmol/L  
 CO2 27 21 - 32 mmol/L Anion gap 12 mmol/L Glucose 159 (H) 65 - 100 mg/dL BUN 54 (H) 8 - 23 MG/DL Creatinine 3.09 (H) 0.6 - 1.0 MG/DL  
 GFR est AA 19 (L) >60 ml/min/1.73m2 GFR est non-AA 15 ml/min/1.73m2 Calcium 8.3 8.3 - 10.4 MG/DL Bilirubin, total 0.3 0.2 - 1.1 MG/DL  
 ALT (SGPT) 20 12 - 65 U/L  
 AST (SGOT) 19 15 - 37 U/L Alk. phosphatase 82 50 - 130 U/L Protein, total 7.0 g/dL Albumin 3.0 (L) 3.2 - 4.6 g/dL Globulin 4.0 (H) 2.3 - 3.5 g/dL A-G Ratio 0.8 TROPONIN I Collection Time: 02/06/19 10:19 AM  
Result Value Ref Range Troponin-I, Qt. <0.02 (L) 0.02 - 0.05 NG/ML  
BNP Collection Time: 02/06/19 10:19 AM  
Result Value Ref Range  pg/mL POC LACTIC ACID Collection Time: 02/06/19 10:29 AM  
Result Value Ref Range Lactic Acid (POC) 1.08 0.5 - 1.9 mmol/L  
EKG, 12 LEAD, INITIAL Collection Time: 02/06/19 10:45 AM  
Result Value Ref Range Ventricular Rate 88 BPM  
 Atrial Rate 88 BPM  
 P-R Interval 168 ms QRS Duration 96 ms  
 Q-T Interval 404 ms QTC Calculation (Bezet) 488 ms Calculated P Axis 21 degrees Calculated R Axis 44 degrees Calculated T Axis 78 degrees Diagnosis Normal sinus rhythm Low voltage QRS Nonspecific ST and T wave abnormality Prolonged QT Abnormal ECG When compared with ECG of 06-FEB-2019 10:40, 
MS interval has decreased Right bundle branch block is no longer Present Confirmed by Rosanna Bates ([id number]) on 2/6/2019 12:01:39 PM 
  
 
Imaging Mark Almanzar Ivelisse Final [99] 2/06/2019 10:18 2/06/2019 10:26 Study Result CHEST X-RAY, one view. 
  
HISTORY:  Worsening shortness breath and cough.  
  
TECHNIQUE:  AP upright upper view. 
  
COMPARISON: None. 
  
FINDINGS:  The lungs demonstrate some bibasilar densities at the right, 
 atelectasis or infiltrate. . The heart size is enlarged. The costophrenic angles 
are sharp. The pulmonary vasculature is unremarkable. Included portion of the 
upper abdomen is unremarkable. There are sternal wires, some of which are 
probably fractured. 
  
IMPRESSION IMPRESSION:  Borderline cardiomegaly with some atelectasis or infiltrate right 
base. 
   
 
 
Assessment and Plan: Active Hospital Problems Diagnosis Date Noted  Acute on chronic respiratory failure with hypoxia ([facility] 02/06/2019  Cellulitis 02/06/2019  COPD exacerbation [facility] 02/06/2019  Anemia 02/06/2019  Severe obesity ([facility] 01/24/2019  Stage 4 chronic kidney disease ([facility] 01/24/2019 A/P 
- Acute/chronic resp failure - usual o2 requirement of 2.5 L now requiring 4L. Wean as tolerated 
- COPDe - ck flu, IV solumedrol, scheduled nebs, mucinex and flutter valve - RLL Community acquired pneumonia -vanco/zosyn (this choice was also for cellulitis). Likely to wean to orals soon if cellulitis improved. Follow cultures - Cellulitis - LUE s/p AVF graft. Request vascular to eval. Vanco/zosyn. - CKD stage 5 - Cr baseline. Monitor. No indications for HD today 
- CHF - uncertain of type. Seemingly compensated. - afib - on eliquis and amiodarone Code Status: Full code Anticipated discharge: 2-3 days Signed By: Ray Gauthier DO February 6, 2019

## 2019-02-06 NOTE — ED NOTES
TRANSFER - OUT REPORT: 
 
Verbal report given to Venita RN(name) on [employer]  being transferred to Med/surg 3rd floor(unit) for routine progression of care Report consisted of patients Situation, Background, Assessment and  
Recommendations(SBAR). Information from the following report(s) SBAR was reviewed with the receiving nurse. Lines:    
 
Opportunity for questions and clarification was provided. Patient transported with: 
 O2 @ 4 liters. ED tech transport

## 2019-02-06 NOTE — PROGRESS NOTES
SW met with patient who states that she lives at [address] [place] [address]. Patient states that she lives with her  Cipriano Berg ([phone]), daughter-in-law, and daughter. Patient is not independent at home, uses a Rollator to ambulate, and denies any falls. Patient states that she has an aide and RN that comes twice a week. SW asked the patient who her [facility] agency was, but she was unsure of their name, only stated \"they're good. \" Patient wearing O2 in her room, states that she's on 2 1/2 L of O2 at home. Patient seen by Dr. Pita Tsai for primary care, last appointment was three weeks ago. Patient denies any needs at this time, but SW will continue to follow. Marisabel Martinez, MARIVEL  [email or web address]

## 2019-02-06 NOTE — PROGRESS NOTES
Vancomycin Consult MD ordering: Glendy Acosta ID following? no Indication: SSTI 
DOT:  7?  days Goal level(s): 15 - 20 (DM) Ht Readings from Last 1 Encounters:  
19 5' 2\" (1.575 m) Wt Readings from Last 1 Encounters:  
19 106.6 kg (235 lb) Temp (24hrs), Av.7 °F (37.1 °C), Min:98.7 °F (37.1 °C), Max:98.7 °F (37.1 °C) Dosing weight: 106 kg 
82 y.o. Date:  Dose/Freq Admin Times Level/Time:  
 2500 mg LD (18) Recent Labs 19 
1029 19 
1019 BUN  --  54* CREA  --  3.09* WBC  --  10.5 LACPOC 1.08  --   
 
Estimated Creatinine Clearance: 16.4 mL/min (A) (based on SCr of 3.09 mg/dL (H)). Day 1 Assessment and Plan: Will initiate Vancomycin 2500 mg IV once for now. Will base future doses on daily random levels.  
 
Gilma EricksonD, BCPS

## 2019-02-06 NOTE — ED NOTES
Patient brought via EMS after starting with some shortness of breath yesterday, patient advises using her albuterol multiple times without relief and EMS administered Combi-vent, 2 G of Magnesium and 125 mg solu-medrol. Patient was initially unable to speak with EMS secondary to shortness of breath. Patient is able to talk at this time however not able to speak complete sentences. Patient is with oxygen at 78% on room air, placed NC at 4lpm with increase to 91%. Patient denies any pain at this time.

## 2019-02-06 NOTE — PROGRESS NOTES
Pharmacokinetic Consult to Pharmacist 
 
Aramsi Shields is a 82 y.o. female being treated for SSTI with vancomycin. Height: 5' 2\" (157.5 cm)  Weight: 106.6 kg (235 lb) Lab Results Component Value Date/Time BUN 54 (H) 02/06/2019 10:19 AM  
 Creatinine 3.09 (H) 02/06/2019 10:19 AM  
 WBC 10.5 02/06/2019 10:19 AM  
 Lactic Acid (POC) 1.08 02/06/2019 10:29 AM  
  
Estimated Creatinine Clearance: 16.4 mL/min (A) (based on SCr of 3.09 mg/dL (H)). CULTURES: 
 
All Micro Results Procedure Component Value Units Date/Time INFLUENZA A & B AG (RAPID TEST) [[id number]] Order Status:  Sent Specimen:  Nasopharyngeal   
 CULTURE, BLOOD [[id number]] Collected:  02/06/19 1041 Order Status:  Completed Specimen:  Whole Blood Updated:  02/06/19 1048 CULTURE, BLOOD [[id number]] Collected:  02/06/19 1041 Order Status:  Completed Specimen:  Whole Blood Updated:  02/06/19 1048 No results found for: Gretchen Dugan Day 1 of vancomycin. Goal trough is 10-20. Patient with CKD/possible ESRD at home. Unclear at this point whether patient on HD at home. Will load patient with vancomycin 2500 mg x 1 dose. Will dose this patient intermittently due to unstable renal function. Will continue to follow patient. Thank you, Mitali Gaines, PharmD, [facility] Clinical Pharmacist 
[phone]

## 2019-02-06 NOTE — ED PROVIDER NOTES
82-year-old female with increasing shortness of breath with wheezing and cough. Over the last day or two. Has nebulizer at home and took two treatments this morning without improvement. EMS was called and noted an O2 sat in the upper 70s and the patient's usual oxygen. She usually is on two half liters at home. Sore chest when she coughs. Has subjective chills and sweats. EMS gave her Solu-Medrol. And magnesium along with nebulizer treatment. The history is provided by the patient. Shortness of Breath This is a new problem. The problem occurs continuously. The problem has been gradually worsening. Associated symptoms include ear pain, cough, wheezing and chest pain. Pertinent negatives include no fever, no headaches, no neck pain, no sputum production, no syncope, no vomiting, no abdominal pain, no rash, no leg pain and no leg swelling. Associated medical issues include COPD and CAD. Associated medical issues do not include PE or DVT. Past Medical History:  
Diagnosis Date  Adverse effect of anesthesia Combative  Anemia  Anxiety  Asthma  Atrial flutter ([address] Takes Eliquis  CAD (coronary artery disease) 2013 CABG x4  
 Cancer ([address] Uterine  Chronic kidney disease  Chronic obstructive pulmonary disease (HCC)   
 wears oxygen 2.5 liters continuous  Chronic pain Lower back  Constipation  DDD (degenerative disc disease), lumbar  Depression  Fatty liver  GERD (gastroesophageal reflux disease)  Heart abnormality  Heart failure ([address]  Hypercholesterolemia  Hypertension  Kidney disease  Morbid obesity ([address]  Nausea & vomiting  Osteoarthritis  PUD (peptic ulcer disease)  Sleep apnea   
 no CPAP, wears 2 liters oxygen at bedtime  Type 2 diabetes mellitus ([address] A1C 7.1 on 9/2018, , Low 80's,  Vitamin B12 deficiency Past Surgical History:  
Procedure Laterality Date  CABG, ARTERY-VEIN, FOUR    HX APPENDECTOMY  HX COLONOSCOPY    
 HX GYN    
 ectopic pregnancy [address] CATHETERIZATION    HX HYSTERECTOMY  HX OTHER SURGICAL  1992  
 neck spurs  HX TONSILLECTOMY  NEUROLOGICAL PROCEDURE UNLISTED    
 diskectomy  VASCULAR SURGERY PROCEDURE UNLIST Right   
 right carotidendarectomy Family History:  
Problem Relation Age of Onset  Cancer Mother   
     breast  
 Diabetes Mother  Heart Disease Mother  Diabetes Father  Heart Disease Father Social History Socioeconomic History  Marital status:  Spouse name: Not on file  Number of children: Not on file  Years of education: Not on file  Highest education level: Not on file Social Needs  Financial resource strain: Not on file  Food insecurity - worry: Not on file  Food insecurity - inability: Not on file  Transportation needs - medical: Not on file  Transportation needs - non-medical: Not on file Occupational History  Not on file Tobacco Use  Smoking status: Former Smoker Packs/day: 1.00 Years: 40.00 Pack years: 40.00 Last attempt to quit:  Years since quittin.1  Smokeless tobacco: Never Used Substance and Sexual Activity  Alcohol use: No  
 Drug use: No  
 Sexual activity: No  
Other Topics Concern  Not on file Social History Narrative  Not on file ALLERGIES: Alprazolam; Demerol [meperidine]; Levaquin [levofloxacin]; Oxycodone; Pepcid [famotidine]; Phenergan [promethazine]; Sitagliptin; and Sulfa (sulfonamide antibiotics) Review of Systems Constitutional: Negative for chills and fever. HENT: Positive for ear pain. Respiratory: Positive for cough, shortness of breath and wheezing. Negative for sputum production. Cardiovascular: Positive for chest pain. Negative for palpitations, leg swelling and syncope. Gastrointestinal: Negative for abdominal pain, diarrhea and vomiting. Genitourinary: Negative for dysuria and flank pain. Musculoskeletal: Negative for back pain and neck pain. Skin: Negative for color change and rash. Neurological: Negative for syncope and headaches. All other systems reviewed and are negative. Vitals:  
 02/06/19 1000 BP: 157/68 Pulse: 94 Resp: 28 Temp: 98.7 °F (37.1 °C) SpO2: (!) 78% Weight: 106.6 kg (235 lb) Height: 5' 2\" (1.575 m) Physical Exam  
Constitutional: She is oriented to person, place, and time. She appears well-developed and well-nourished. No distress. HENT:  
Head: Normocephalic and atraumatic. Right Ear: External ear normal.  
Left Ear: External ear normal.  
Mouth/Throat: Oropharynx is clear and moist. No oropharyngeal exudate. Eyes: Conjunctivae and EOM are normal. Pupils are equal, round, and reactive to light. Neck: Normal range of motion. Neck supple. Cardiovascular: Normal rate, regular rhythm and intact distal pulses. No murmur heard. Pulmonary/Chest: No respiratory distress. She has wheezes. Abdominal: Soft. Bowel sounds are normal. She exhibits no mass. There is no tenderness. There is no rebound and no guarding. No hernia. Musculoskeletal:  
     Left lower leg: She exhibits tenderness and edema. Neurological: She is alert and oriented to person, place, and time. Gait normal.  
Nl speech Skin: Skin is warm and dry. Incisions clear without drainage. Some erythema of the left forearm at the site of the recently placed dialysis shunt. Psychiatric: She has a normal mood and affect. Her speech is normal.  
Nursing note and vitals reviewed. MDM Number of Diagnoses or Management Options Diagnosis management comments: Check for congestive heart failure, pneumonia, exacerbation of COPD. Chest x-ray nebulizer treatments. Amount and/or Complexity of Data Reviewed Clinical lab tests: ordered and reviewed Tests in the radiology section of CPT®: ordered and reviewed Tests in the medicine section of CPT®: ordered and reviewed Review and summarize past medical records: yes (Left forearm AV fistula placed one week ago.) Independent visualization of images, tracings, or specimens: yes Risk of Complications, Morbidity, and/or Mortality Presenting problems: moderate Diagnostic procedures: low Management options: moderate Patient Progress Patient progress: stable Procedures Results Include: 
 
Recent Results (from the past 24 hour(s)) CBC WITH AUTOMATED DIFF Collection Time: 02/06/19 10:19 AM  
Result Value Ref Range WBC 10.5 4.3 - 11.1 K/uL  
 RBC 2.55 (L) 4.05 - 5.2 M/uL HGB 7.6 (L) 11.7 - 15.4 g/dL HCT 24.8 (L) 35.8 - 46.3 % MCV 97.3 79.6 - 97.8 FL  
 MCH 29.8 26.1 - 32.9 PG  
 MCHC 30.6 (L) 31.4 - 35.0 g/dL  
 RDW 16.8 (H) 11.9 - 14.6 % PLATELET 364 275 - 789 K/uL MPV 11.3 9.4 - 12.3 FL ABSOLUTE NRBC 0.00 0.0 - 0.2 K/uL  
 DF AUTOMATED NEUTROPHILS 81 (H) 43 - 78 % LYMPHOCYTES 10 (L) 13 - 44 % MONOCYTES 7 4.0 - 12.0 % EOSINOPHILS 2 0.5 - 7.8 % BASOPHILS 0 0.0 - 2.0 % IMMATURE GRANULOCYTES 0 0.0 - 5.0 %  
 ABS. NEUTROPHILS 8.5 (H) 1.7 - 8.2 K/UL  
 ABS. LYMPHOCYTES 1.0 0.5 - 4.6 K/UL  
 ABS. MONOCYTES 0.7 0.1 - 1.3 K/UL  
 ABS. EOSINOPHILS 0.2 0.0 - 0.8 K/UL  
 ABS. BASOPHILS 0.0 0.0 - 0.2 K/UL  
 ABS. IMM. GRANS. 0.0 0.0 - 0.5 K/UL METABOLIC PANEL, COMPREHENSIVE Collection Time: 02/06/19 10:19 AM  
Result Value Ref Range Sodium 142 136 - 145 mmol/L Potassium 4.2 3.5 - 5.1 mmol/L Chloride 103 98 - 107 mmol/L  
 CO2 27 21 - 32 mmol/L Anion gap 12 mmol/L Glucose 159 (H) 65 - 100 mg/dL BUN 54 (H) 8 - 23 MG/DL Creatinine 3.09 (H) 0.6 - 1.0 MG/DL  
 GFR est AA 19 (L) >60 ml/min/1.73m2 GFR est non-AA 15 ml/min/1.73m2  Calcium 8.3 8.3 - 10.4 MG/DL  
 Bilirubin, total 0.3 0.2 - 1.1 MG/DL  
 ALT (SGPT) 20 12 - 65 U/L  
 AST (SGOT) 19 15 - 37 U/L Alk. phosphatase 82 50 - 130 U/L Protein, total 7.0 g/dL Albumin 3.0 (L) 3.2 - 4.6 g/dL Globulin 4.0 (H) 2.3 - 3.5 g/dL A-G Ratio 0.8 TROPONIN I Collection Time: 02/06/19 10:19 AM  
Result Value Ref Range Troponin-I, Qt. <0.02 (L) 0.02 - 0.05 NG/ML  
BNP Collection Time: 02/06/19 10:19 AM  
Result Value Ref Range  pg/mL POC LACTIC ACID Collection Time: 02/06/19 10:29 AM  
Result Value Ref Range Lactic Acid (POC) 1.08 0.5 - 1.9 mmol/L  
EKG, 12 LEAD, INITIAL Collection Time: 02/06/19 10:45 AM  
Result Value Ref Range Ventricular Rate 88 BPM  
 Atrial Rate 88 BPM  
 P-R Interval 168 ms QRS Duration 96 ms  
 Q-T Interval 404 ms QTC Calculation (Bezet) 488 ms Calculated P Axis 21 degrees Calculated R Axis 44 degrees Calculated T Axis 78 degrees Diagnosis Normal sinus rhythm Low voltage QRS Nonspecific ST and T wave abnormality Prolonged QT Abnormal ECG When compared with ECG of 06-FEB-2019 10:40, 
[place] interval has decreased Right bundle branch block is no longer Present Confirmed by Alex Lee ([id number]) on 2/6/2019 12:01:39 PM 
  
 
Xr Chest [facility] Result Date: 2/6/2019 CHEST X-RAY, one view. HISTORY:  Worsening shortness breath and cough. TECHNIQUE:  AP upright upper view. COMPARISON: None. FINDINGS:  The lungs demonstrate some bibasilar densities at the right, atelectasis or infiltrate. . The heart size is enlarged. The costophrenic angles are sharp. The pulmonary vasculature is unremarkable. Included portion of the upper abdomen is unremarkable. There are sternal wires, some of which are probably fractured. IMPRESSION:  Borderline cardiomegaly with some atelectasis or infiltrate right base. Patient was cultured up and placed on IV antibiotics.   When placed back unusual to have liters of oxygen, patient drops O2 sat 85%. Suspect main problem is COPD exacerbation. Patient is treated for infiltrate right base. Possibly some concern for fluid overload given poor kidney function. Question of a cellulitis of the left AV fistula site.

## 2019-02-07 PROBLEM — G47.33 OSA (OBSTRUCTIVE SLEEP APNEA): Status: ACTIVE | Noted: 2019-02-07

## 2019-02-07 PROBLEM — R91.8 PULMONARY INFILTRATE IN RIGHT LUNG ON CHEST X-RAY: Status: ACTIVE | Noted: 2019-02-06

## 2019-02-07 LAB
ANION GAP SERPL CALC-SCNC: 11 MMOL/L
BUN SERPL-MCNC: 66 MG/DL (ref 8–23)
CALCIUM SERPL-MCNC: 8.3 MG/DL (ref 8.3–10.4)
CHLORIDE SERPL-SCNC: 103 MMOL/L (ref 98–107)
CO2 SERPL-SCNC: 26 MMOL/L (ref 21–32)
CREAT SERPL-MCNC: 3.3 MG/DL (ref 0.6–1)
ERYTHROCYTE [DISTWIDTH] IN BLOOD BY AUTOMATED COUNT: 16.3 % (ref 11.9–14.6)
FERRITIN SERPL-MCNC: 135 NG/ML (ref 8–388)
FLUAV AG NPH QL IA: NEGATIVE
FLUBV AG NPH QL IA: NEGATIVE
GLUCOSE BLD STRIP.AUTO-MCNC: 263 MG/DL (ref 65–100)
GLUCOSE BLD STRIP.AUTO-MCNC: 264 MG/DL (ref 65–100)
GLUCOSE BLD STRIP.AUTO-MCNC: 301 MG/DL (ref 65–100)
GLUCOSE BLD STRIP.AUTO-MCNC: 328 MG/DL (ref 65–100)
GLUCOSE SERPL-MCNC: 236 MG/DL (ref 65–100)
HCT VFR BLD AUTO: 24 % (ref 35.8–46.3)
HGB BLD-MCNC: 7.4 G/DL (ref 11.7–15.4)
IRON SATN MFR SERPL: 10 %
IRON SERPL-MCNC: 27 UG/DL
MCH RBC QN AUTO: 29.4 PG (ref 26.1–32.9)
MCHC RBC AUTO-ENTMCNC: 30.8 G/DL (ref 31.4–35)
MCV RBC AUTO: 95.2 FL (ref 79.6–97.8)
NRBC # BLD: 0 K/UL (ref 0–0.2)
PLATELET # BLD AUTO: 266 K/UL (ref 150–450)
PMV BLD AUTO: 11.7 FL (ref 9.4–12.3)
POTASSIUM SERPL-SCNC: 4.4 MMOL/L (ref 3.5–5.1)
PROCALCITONIN SERPL-MCNC: 0.2 NG/ML
RBC # BLD AUTO: 2.52 M/UL (ref 4.05–5.2)
SODIUM SERPL-SCNC: 140 MMOL/L (ref 136–145)
SPECIMEN SOURCE: NORMAL
TIBC SERPL-MCNC: 262 UG/DL (ref 250–450)
VANCOMYCIN SERPL-MCNC: 24.5 UG/ML
WBC # BLD AUTO: 11 K/UL (ref 4.3–11.1)

## 2019-02-07 PROCEDURE — 97161 PT EVAL LOW COMPLEX 20 MIN: CPT

## 2019-02-07 PROCEDURE — 85027 COMPLETE CBC AUTOMATED: CPT

## 2019-02-07 PROCEDURE — 99218 HC RM OBSERVATION: CPT

## 2019-02-07 PROCEDURE — 94760 N-INVAS EAR/PLS OXIMETRY 1: CPT

## 2019-02-07 PROCEDURE — 74011000250 HC RX REV CODE- 250: Performed by: INTERNAL MEDICINE

## 2019-02-07 PROCEDURE — 77030021668 HC NEB PREFIL KT VYRM -A

## 2019-02-07 PROCEDURE — 74011636637 HC RX REV CODE- 636/637: Performed by: INTERNAL MEDICINE

## 2019-02-07 PROCEDURE — 87804 INFLUENZA ASSAY W/OPTIC: CPT

## 2019-02-07 PROCEDURE — 36415 COLL VENOUS BLD VENIPUNCTURE: CPT

## 2019-02-07 PROCEDURE — 97530 THERAPEUTIC ACTIVITIES: CPT

## 2019-02-07 PROCEDURE — 82728 ASSAY OF FERRITIN: CPT

## 2019-02-07 PROCEDURE — 82962 GLUCOSE BLOOD TEST: CPT

## 2019-02-07 PROCEDURE — 74011250636 HC RX REV CODE- 250/636: Performed by: INTERNAL MEDICINE

## 2019-02-07 PROCEDURE — 77010033678 HC OXYGEN DAILY

## 2019-02-07 PROCEDURE — 80048 BASIC METABOLIC PNL TOTAL CA: CPT

## 2019-02-07 PROCEDURE — 80202 ASSAY OF VANCOMYCIN: CPT

## 2019-02-07 PROCEDURE — 74011250637 HC RX REV CODE- 250/637: Performed by: INTERNAL MEDICINE

## 2019-02-07 PROCEDURE — 94640 AIRWAY INHALATION TREATMENT: CPT

## 2019-02-07 PROCEDURE — 84145 PROCALCITONIN (PCT): CPT

## 2019-02-07 PROCEDURE — 77030012793 HC CIRC VNTLTR FISP -B

## 2019-02-07 PROCEDURE — 99215 OFFICE O/P EST HI 40 MIN: CPT | Performed by: INTERNAL MEDICINE

## 2019-02-07 PROCEDURE — 83540 ASSAY OF IRON: CPT

## 2019-02-07 PROCEDURE — 74011000258 HC RX REV CODE- 258: Performed by: INTERNAL MEDICINE

## 2019-02-07 PROCEDURE — 77010033711 HC HIGH FLOW OXYGEN

## 2019-02-07 RX ORDER — IPRATROPIUM BROMIDE AND ALBUTEROL SULFATE 2.5; .5 MG/3ML; MG/3ML
3 SOLUTION RESPIRATORY (INHALATION)
Status: DISCONTINUED | OUTPATIENT
Start: 2019-02-07 | End: 2019-02-11

## 2019-02-07 RX ADMIN — FLUTICASONE PROPIONATE 2 SPRAY: 50 SPRAY, METERED NASAL at 08:00

## 2019-02-07 RX ADMIN — METHYLPREDNISOLONE SODIUM SUCCINATE 40 MG: 40 INJECTION, POWDER, FOR SOLUTION INTRAMUSCULAR; INTRAVENOUS at 22:35

## 2019-02-07 RX ADMIN — INSULIN LISPRO 6 UNITS: 100 INJECTION, SOLUTION INTRAVENOUS; SUBCUTANEOUS at 22:37

## 2019-02-07 RX ADMIN — PIPERACILLIN SODIUM,TAZOBACTAM SODIUM 3.38 G: 3; .375 INJECTION, POWDER, FOR SOLUTION INTRAVENOUS at 12:03

## 2019-02-07 RX ADMIN — LORATADINE 10 MG: 10 TABLET ORAL at 08:00

## 2019-02-07 RX ADMIN — Medication 10 ML: at 22:29

## 2019-02-07 RX ADMIN — Medication 10 ML: at 05:10

## 2019-02-07 RX ADMIN — IPRATROPIUM BROMIDE AND ALBUTEROL SULFATE 3 ML: .5; 3 SOLUTION RESPIRATORY (INHALATION) at 03:11

## 2019-02-07 RX ADMIN — METHYLPREDNISOLONE SODIUM SUCCINATE 40 MG: 40 INJECTION, POWDER, FOR SOLUTION INTRAMUSCULAR; INTRAVENOUS at 05:10

## 2019-02-07 RX ADMIN — APIXABAN 2.5 MG: 2.5 TABLET, FILM COATED ORAL at 22:31

## 2019-02-07 RX ADMIN — IPRATROPIUM BROMIDE AND ALBUTEROL SULFATE 3 ML: .5; 3 SOLUTION RESPIRATORY (INHALATION) at 07:50

## 2019-02-07 RX ADMIN — MONTELUKAST SODIUM 10 MG: 10 TABLET, FILM COATED ORAL at 22:28

## 2019-02-07 RX ADMIN — INSULIN LISPRO 8 UNITS: 100 INJECTION, SOLUTION INTRAVENOUS; SUBCUTANEOUS at 12:03

## 2019-02-07 RX ADMIN — APIXABAN 2.5 MG: 2.5 TABLET, FILM COATED ORAL at 07:58

## 2019-02-07 RX ADMIN — FERROUS SULFATE TAB 325 MG (65 MG ELEMENTAL FE) 325 MG: 325 (65 FE) TAB at 07:56

## 2019-02-07 RX ADMIN — METHYLPREDNISOLONE SODIUM SUCCINATE 40 MG: 40 INJECTION, POWDER, FOR SOLUTION INTRAMUSCULAR; INTRAVENOUS at 13:33

## 2019-02-07 RX ADMIN — IPRATROPIUM BROMIDE AND ALBUTEROL SULFATE 3 ML: .5; 3 SOLUTION RESPIRATORY (INHALATION) at 15:30

## 2019-02-07 RX ADMIN — TRAMADOL HYDROCHLORIDE 50 MG: 50 TABLET, FILM COATED ORAL at 09:24

## 2019-02-07 RX ADMIN — Medication 10 ML: at 13:33

## 2019-02-07 RX ADMIN — INSULIN LISPRO 6 UNITS: 100 INJECTION, SOLUTION INTRAVENOUS; SUBCUTANEOUS at 17:01

## 2019-02-07 RX ADMIN — AMIODARONE HYDROCHLORIDE 200 MG: 200 TABLET ORAL at 07:57

## 2019-02-07 RX ADMIN — BUDESONIDE 500 MCG: 0.5 INHALANT RESPIRATORY (INHALATION) at 07:50

## 2019-02-07 RX ADMIN — VERAPAMIL HYDROCHLORIDE 120 MG: 240 TABLET, FILM COATED, EXTENDED RELEASE ORAL at 22:28

## 2019-02-07 RX ADMIN — ASPIRIN 81 MG: 81 TABLET, DELAYED RELEASE ORAL at 08:00

## 2019-02-07 RX ADMIN — IPRATROPIUM BROMIDE AND ALBUTEROL SULFATE 3 ML: .5; 3 SOLUTION RESPIRATORY (INHALATION) at 23:52

## 2019-02-07 RX ADMIN — GUAIFENESIN 600 MG: 600 TABLET, EXTENDED RELEASE ORAL at 22:28

## 2019-02-07 RX ADMIN — FUROSEMIDE 40 MG: 40 TABLET ORAL at 08:00

## 2019-02-07 RX ADMIN — BUDESONIDE 500 MCG: 0.5 INHALANT RESPIRATORY (INHALATION) at 19:45

## 2019-02-07 RX ADMIN — INSULIN LISPRO 8 UNITS: 100 INJECTION, SOLUTION INTRAVENOUS; SUBCUTANEOUS at 07:53

## 2019-02-07 RX ADMIN — POTASSIUM CHLORIDE 10 MEQ: 10 TABLET, EXTENDED RELEASE ORAL at 07:59

## 2019-02-07 RX ADMIN — PANTOPRAZOLE SODIUM 40 MG: 40 TABLET, DELAYED RELEASE ORAL at 07:56

## 2019-02-07 RX ADMIN — ATORVASTATIN CALCIUM 40 MG: 40 TABLET, FILM COATED ORAL at 22:28

## 2019-02-07 RX ADMIN — IPRATROPIUM BROMIDE AND ALBUTEROL SULFATE 3 ML: .5; 3 SOLUTION RESPIRATORY (INHALATION) at 19:45

## 2019-02-07 RX ADMIN — ALLOPURINOL 100 MG: 100 TABLET ORAL at 07:57

## 2019-02-07 RX ADMIN — IPRATROPIUM BROMIDE AND ALBUTEROL SULFATE 3 ML: .5; 3 SOLUTION RESPIRATORY (INHALATION) at 11:27

## 2019-02-07 RX ADMIN — GUAIFENESIN 600 MG: 600 TABLET, EXTENDED RELEASE ORAL at 08:00

## 2019-02-07 NOTE — PROGRESS NOTES
Problem: Mobility Impaired (Adult and Pediatric) Goal: *Acute Goals and Plan of Care (Insert Text) DISCHARGE GOALS: 
(1.)Ms. Demarcus Bird will move from supine to sit and sit to supine  in bed with SUPERVISION within 5 treatment day(s). (2.)Ms. Demarcus Bird will transfer from bed to chair and chair to bed with SUPERVISION using the least restrictive device within 5 treatment day(s). (3.)Ms. Demarcus Bird will ambulate with SUPERVISION for 25 feet with the least restrictive device within 5 treatment day(s). ________________________________________________________________________________________________ PHYSICAL THERAPY: Initial Assessment and PM 2/7/2019OBSERVATION:   
Payor: Ivon Sanabria / Plan: [address] [place] [address] HMO / Product Type: Managed Care Medicare /   
  
NAME/AGE/GENDER: Carlee Aguilar is a 82 y.o. female PRIMARY DIAGNOSIS: Acute on chronic respiratory failure with hypoxia ([facility] [J96.21] Acute on chronic respiratory failure with hypoxia ([facility]) [J96.21] Pulmonary infiltrate in right lung on chest x-ray Pulmonary infiltrate in right lung on chest x-ray ICD-10: Treatment Diagnosis:  
 · Generalized Muscle Weakness (M62.81) · Difficulty in walking, Not elsewhere classified (R26.2) Precaution/Allergies: 
Alprazolam; Demerol [meperidine]; Levaquin [levofloxacin]; Oxycodone; Pepcid [famotidine]; Phenergan [promethazine]; Sitagliptin; and Sulfa (sulfonamide antibiotics) ASSESSMENT:  
Ms. Demarcus Bird presents with above diagnosis. Patient demonstrates generalized weakness and shortness of breath affecting mobility and activity tolerance. Patient with limited mobility prior to this hospitalization. She has assistance for ADLs and uses a rollator or a wheelchair at home. She often transfers from her bed to a wheelchair to a [facility] or toilet. Patient is current with [facility] RN and PT ([facility]) and has an aide 2 times a week to help with bathing.   Patient would benefit from therapy to address her strength and mobility but she [facility] at d/c. This section established at most recent assessment PROBLEM LIST (Impairments causing functional limitations): 1. Decreased Strength 2. Decreased ADL/Functional Activities 3. Decreased Transfer Abilities 4. Decreased Ambulation Ability/Technique 5. Decreased Balance 6. Decreased Activity Tolerance 7. Increased Shortness of Breath 8. Edema/Girth INTERVENTIONS PLANNED: (Benefits and precautions of physical therapy have been discussed with the patient.) 1. Balance Exercise 2. Bed Mobility 3. Family Education 4. Gait Training 5. Therapeutic Activites 6. Therapeutic Exercise/Strengthening TREATMENT PLAN: Frequency/Duration: daily for duration of hospital stay Rehabilitation Potential For Stated Goals: Good RECOMMENDED REHABILITATION/EQUIPMENT: (at time of discharge pending progress): Due to the probability of continued deficits (see above) this patient will likely need continued skilled physical therapy after discharge. Equipment:  
? None at this time HISTORY:  
History of Present Injury/Illness (Reason for Referral): 
Pt 83F with pmhx of CABG x 4, WILLIAM does not use CPAP, CHF, Afib on eliquis and amiodarone, CKD stage 5 (baseline Cr 3-4) s/p recent AVF placement on 1/31, DM2 presents with report of increasing wheeezing, cough and fever/chills for last 3-4 days. She has COPD and usually wears 2.5 L during day. Was found hypoxic at 85% on this amount. Now requiring 4lnc. Also noted to have red/warm LUE s/p recent AV graft placement CXR report RLL infiltrate. WBC 10K, hgb 7.6 (usually hgb around 8.5) 
  Hospitalist asked to admit for COPDe and LUE cellulitis. Past Medical History/Comorbidities: Ms. Irasema Rodriguez  has a past medical history of Adverse effect of anesthesia, Anemia, Anxiety, Asthma, Atrial flutter ([address] CAD (coronary artery disease) (2013), Cancer [facility]), Chronic kidney disease, Chronic obstructive pulmonary disease ([facility] Chronic pain, Constipation, DDD (degenerative disc disease), lumbar, Depression, Fatty liver, GERD (gastroesophageal reflux disease), Heart abnormality, Heart failure ([facility] Hypercholesterolemia, Hypertension, Kidney disease, Morbid obesity ([facility] Nausea & vomiting, Osteoarthritis, PUD (peptic ulcer disease), Sleep apnea, Type 2 diabetes mellitus ([facility] and Vitamin B12 deficiency. Ms. Evelyn Bolaños  has a past surgical history that includes hx heart catheterization (2013); pr cabg, artery-vein, four (2013); vascular surgery procedure unlist (Right); hx hysterectomy; hx gyn; hx other surgical (1992); pr neurological procedure unlisted; hx appendectomy; hx tonsillectomy; and hx colonoscopy. Social History/Living Environment:  
Home Environment: Private residence Wheelchair Ramp: Yes One/Two Story Residence: Two story(basement - lives on main level and doesn't use basement) Living Alone: No 
Support Systems: Family member(s), Spouse/Significant Other/Partner Patient Expects to be Discharged to[de-identified] Private residence Current DME Used/Available at Home: Cane, straight, Commode, bedside, Glucometer, Nebulizer, Walker, rollator, Wheelchair Tub or Shower Type: Other (comment)(walk-in tub/shower with built in seat) Prior Level of Function/Work/Activity: 
Limited ambulation-using rollator or wheelchair. Number of Personal Factors/Comorbidities that affect the Plan of Care: 1-2: MODERATE COMPLEXITY EXAMINATION:  
Most Recent Physical Functioning:  
Gross Assessment: 
AROM: Generally decreased, functional 
Strength: Generally decreased, functional 
Coordination: Within functional limits Tone: Normal 
         
  
Posture: 
Posture (WDL): Exceptions to [facility] Posture Assessment: Forward head, Rounded shoulders Balance: 
Sitting: Intact Standing: Pull to stand; With support Bed Mobility: 
Supine to Sit: Stand-by assistance Sit to Supine: Stand-by assistance Wheelchair Mobility: 
  
Transfers: 
Sit to Stand: Stand-by assistance;Contact guard assistance Stand to Sit: Stand-by assistance;Contact guard assistance Bed to Chair: Stand-by assistance;Contact guard assistance Gait: 
  
Base of Support: Center of gravity altered Speed/Kari: Shuffled; Slow Step Length: Left shortened;Right shortened Gait Abnormalities: Decreased step clearance Distance (ft): 3 Feet (ft)(x 2) Ambulation - Level of Assistance: Stand-by assistance;Contact guard assistance Interventions: Safety awareness training;Verbal cues Body Structures Involved: 1. Muscles Body Functions Affected: 1. Movement Related Activities and Participation Affected: 1. Mobility 2. Self Care Number of elements that affect the Plan of Care: 4+: HIGH COMPLEXITY CLINICAL PRESENTATION:  
Presentation: Stable and uncomplicated: LOW COMPLEXITY CLINICAL DECISION MAKING:  
[facility] AM-PAC 6 Clicks Basic Mobility Inpatient Short Form How much difficulty does the patient currently have. .. Unable A Lot A Little None 1. Turning over in bed (including adjusting bedclothes, sheets and blankets)? [] 1   [] 2   [x] 3   [] 4  
2. Sitting down on and standing up from a chair with arms ( e.g., wheelchair, bedside commode, etc.)   [] 1   [] 2   [x] 3   [] 4  
3. Moving from lying on back to sitting on the side of the bed? [] 1   [] 2   [x] 3   [] 4 How much help from another person does the patient currently need. .. Total A Lot A Little None 4. Moving to and from a bed to a chair (including a wheelchair)? [] 1   [] 2   [x] 3   [] 4  
5. Need to walk in hospital room? [] 1   [] 2   [x] 3   [] 4  
6. Climbing 3-5 steps with a railing? [] 1   [x] 2   [] 3   [] 4  
© 2007, Trustees of [facility], under license to [employer]. All rights reserved Score:  Initial: 17 Most Recent: X (Date: -- ) Interpretation of Tool:  Represents activities that are increasingly more difficult (i.e. Bed mobility, Transfers, Gait). Medical Necessity:    
· Patient is expected to demonstrate progress in strength, balance and coordination to increase independence with mobility and improve tolerance for activity. Reason for Services/Other Comments: 
· Patient continues to require skilled intervention due to decreased strength and mobility. Use of outcome tool(s) and clinical judgement create a POC that gives a: Clear prediction of patient's progress: LOW COMPLEXITY  
  
 
 
 
TREATMENT:  
(In addition to Assessment/Re-Assessment sessions the following treatments were rendered) Pre-treatment Symptoms/Complaints:  Patient agreeable to working with therapy. Pain: Initial:  
Pain Intensity 1: 0  Post Session:  0 Therapeutic Activity: (    15 minutes): Therapeutic activities including Bed transfers and Toilet transfers to improve mobility, strength, balance and coordination. Required minimal Safety awareness training;Verbal cues to promote static and dynamic balance in standing. Braces/Orthotics/Lines/Etc:  
· IV 
· O2 Device: Other (comment)(airvo) Treatment/Session Assessment:   
· Response to Treatment:  Patient participated well. Moved better than anticipated but limited tolerance for activity. · Interdisciplinary Collaboration:  
o Physical Therapist 
o Registered Nurse 
o Respiratory Therapist 
· After treatment position/precautions:  
o Supine in bed 
o Bed/Chair-wheels locked 
o Bed in low position 
o Call light within reach 
o RT at bedside · Compliance with Program/Exercises: Compliant all of the time · Recommendations/Intent for next treatment session: \"Next visit will focus on advancements to more challenging activities and reduction in assistance provided\". Total Treatment Duration: PT Patient Time In/Time Out Time In: 1500 Time Out: 1530 [place], PT

## 2019-02-07 NOTE — CONSULTS
CONSULT NOTE    Crystaljulito Coelloes    2019    Date of Admission:  2019    The patient's chart is reviewed and the patient is discussed with the staff. Subjective:     Patient is a 82 y.o.  female seen and evaluated at the request of Dr. Darby Arthur. She has WILILAM, CHF, Afib on eliquis and amiodarone, CKD stage 5 (baseline Cr 3-4) s/p recent AVF placement on , DM2 presents with report of increasing wheeezing, cough and fever/chills for last 3-4 days. She has COPD and usually wears 2.5 L during day ( see Dr. Nakia Amaya at [facility] ) she was found hypoxic at 85% in ED and was admitted for COPD flare up and ? Left arm cellulitis      Review of Systems  A comprehensive review of systems was negative except for that written in the HPI. Patient Active Problem List   Diagnosis Code    Hydronephrosis N13.30    Incomplete bladder emptying R33.9    Mixed incontinence urge and stress (male)(female) N39.46    Acquired cyst of kidney N28.1    Severe obesity ([facility]) E66.01    Stage 4 chronic kidney disease ([facility] [address] N18.4    Acute on chronic respiratory failure with hypoxia ([facility]) J96.21    Cellulitis L03.90    COPD exacerbation ([facility]) J44.1    Anemia D64.9    Pulmonary infiltrate in right lung on chest x-ray R91.8    WILLIAM (obstructive sleep apnea) G47.33       Home DME company unknown. Prior to Admission Medications   Prescriptions Last Dose Informant Patient Reported? Taking? HYDROcodone-acetaminophen (NORCO)  mg tablet   Yes No   Sig: Take 1 Tab by mouth every eight (8) hours as needed for Pain. LORazepam (ATIVAN) 1 mg tablet   Yes No   Si.5 mg three (3) times daily as needed for Anxiety. Omeprazole delayed release (PRILOSEC D/R) 20 mg tablet   Yes No   Sig: Take 20 mg by mouth daily. albuterol (PROVENTIL VENTOLIN) 2.5 mg /3 mL (0.083 %) nebulizer solution   Yes No   Si.5 mg by Nebulization route every six (6) hours as needed.    albuterol (VENTOLIN HFA) 90 mcg/actuation inhaler   Yes No   Sig: Take 2 Puffs by inhalation every six (6) hours as needed for Wheezing. allopurinol (ZYLOPRIM) 100 mg tablet   Yes No   Sig: Take 100 mg by mouth daily. amiodarone (CORDARONE) 200 mg tablet   Yes No   Sig: Take 200 mg by mouth daily. apixaban (ELIQUIS) 2.5 mg tablet   Yes No   Sig: Take 2.5 mg by mouth two (2) times a day. aspirin delayed-release 81 mg tablet   Yes No   Sig: Take 81 mg by mouth daily. atorvastatin (LIPITOR) 40 mg tablet   Yes No   Sig: Take 40 mg by mouth daily. cetirizine (ZYRTEC) 10 mg tablet   Yes No   Sig: Take 10 mg by mouth daily. cholecalciferol (VITAMIN D3) 1,000 unit cap   Yes No   Sig: Take 1,000 Units by mouth daily. ferrous sulfate 325 mg (65 mg iron) tablet   Yes No   Sig: Take 325 mg by mouth Daily (before breakfast). fluticasone (FLONASE ALLERGY RELIEF) 50 mcg/actuation nasal spray   Yes No   Si Sprays by Both Nostrils route daily. fluticasone-vilanterol (BREO ELLIPTA) 100-25 mcg/dose inhaler   Yes No   Sig: Take 1 Puff by inhalation daily. furosemide (LASIX) 40 mg tablet   Yes No   Sig: Take 40 mg by mouth daily. glimepiride (AMARYL) 1 mg tablet   Yes No   Sig: Take 1 mg by mouth Daily (before breakfast). montelukast (SINGULAIR) 10 mg tablet   Yes No   Sig: Take 10 mg by mouth daily. nitroglycerin (NITROSTAT) 0.4 mg SL tablet   Yes No   Si.4 mg by SubLINGual route every five (5) minutes as needed for Chest Pain. Up to 3 doses. potassium chloride SA (MICRO-K) 10 mEq capsule   Yes No   Sig: Take 10 mEq by mouth daily. traMADol (ULTRAM) 50 mg tablet   Yes No   Sig: Take 50 mg by mouth every eight (8) hours as needed for Pain.   umeclidinium (INCRUSE ELLIPTA) 62.5 mcg/actuation inhaler   Yes No   Sig: Take 1 Puff by inhalation daily. verapamil SR (CALAN-SR) 240 mg CR tablet   Yes No   Sig: Take 120 mg by mouth nightly.  Takes 1/2 tab      Facility-Administered Medications: None       Past Medical History: Diagnosis Date    Adverse effect of anesthesia     Combative    Anemia     Anxiety     Asthma     Atrial flutter (HCC)     Takes Eliquis    CAD (coronary artery disease) 2013    CABG x4    Cancer (HCC)     Uterine    Chronic kidney disease     Chronic obstructive pulmonary disease (HCC)     wears oxygen 2.5 liters continuous    Chronic pain     Lower back    Constipation     DDD (degenerative disc disease), lumbar     Depression     Fatty liver     GERD (gastroesophageal reflux disease)     Heart abnormality     Heart failure (HCC)     Hypercholesterolemia     Hypertension     Kidney disease     Morbid obesity ([address]     Nausea & vomiting     Osteoarthritis     PUD (peptic ulcer disease)     Sleep apnea     no CPAP, wears 2 liters oxygen at bedtime    Type 2 diabetes mellitus (HCC)     A1C 7.1 on 9/2018, , Low 80's,    Vitamin B12 deficiency      Past Surgical History:   Procedure Laterality Date    CABG, ARTERY-VEIN, FOUR  2013    HX APPENDECTOMY      HX COLONOSCOPY      HX GYN      ectopic pregnancy    HX HEART CATHETERIZATION  2013    HX HYSTERECTOMY      HX OTHER SURGICAL  1992    neck spurs    HX TONSILLECTOMY      NEUROLOGICAL PROCEDURE UNLISTED      diskectomy    VASCULAR SURGERY PROCEDURE UNLIST Right     right carotidendarectomy     Social History     Socioeconomic History    Marital status:      Spouse name: Not on file    Number of children: Not on file    Years of education: Not on file    Highest education level: Not on file   Social Needs    Financial resource strain: Not on file    Food insecurity - worry: Not on file    Food insecurity - inability: Not on file   [employer] needs - medical: Not on file   [employer] needs - non-medical: Not on file   Occupational History    Not on file   Tobacco Use    Smoking status: Former Smoker     Packs/day: 1.00     Years: 40.00     Pack years: 40.00     Last attempt to quit: 2012     Years since quittin.1    Smokeless tobacco: Never Used   Substance and Sexual Activity    Alcohol use: No    Drug use: No    Sexual activity: No   Other Topics Concern    Not on file   Social History Narrative    Not on file     Family History   Problem Relation Age of Onset    Cancer Mother         breast    Diabetes Mother     Heart Disease Mother     Diabetes Father     Heart Disease Father      Allergies   Allergen Reactions    Alprazolam Drowsiness    Demerol [Meperidine] Unknown (comments)    Levaquin [Levofloxacin] Rash    Oxycodone Unknown (comments) and Nausea and Vomiting    Pepcid [Famotidine] Nausea and Vomiting    Phenergan [Promethazine] Unknown (comments) and Other (comments)     Turns red and swells    Sitagliptin Unknown (comments)    Sulfa (Sulfonamide Antibiotics) Nausea and Vomiting       Current Facility-Administered Medications   Medication Dose Route Frequency    albuterol-ipratropium (DUO-NEB) 2.5 MG-0.5 MG/3 ML  3 mL Nebulization Q4H RT    methylPREDNISolone (PF) (SOLU-MEDROL) injection 40 mg  40 mg IntraVENous Q8H    allopurinol (ZYLOPRIM) tablet 100 mg  100 mg Oral DAILY    amiodarone (CORDARONE) tablet 200 mg  200 mg Oral DAILY    apixaban (ELIQUIS) tablet 2.5 mg  2.5 mg Oral Q12H    aspirin delayed-release tablet 81 mg  81 mg Oral DAILY    loratadine (CLARITIN) tablet 10 mg  10 mg Oral DAILY    ferrous sulfate tablet 325 mg  325 mg Oral ACB    fluticasone (FLONASE) 50 mcg/actuation nasal spray 2 Spray  2 Spray Both Nostrils DAILY    furosemide (LASIX) tablet 40 mg  40 mg Oral DAILY    budesonide (PULMICORT) 500 mcg/2 ml nebulizer suspension  500 mcg Nebulization BID RT    insulin lispro (HUMALOG) injection   SubCUTAneous AC&HS    LORazepam (ATIVAN) tablet 0.5 mg  0.5 mg Oral TID PRN    montelukast (SINGULAIR) tablet 10 mg  10 mg Oral QHS    pantoprazole (PROTONIX) tablet 40 mg  40 mg Oral ACB    potassium chloride (KLOR-CON) tablet 10 mEq  10 mEq Oral DAILY    traMADol (ULTRAM) tablet 50 mg  50 mg Oral Q8H PRN    verapamil ER (CALAN-SR) tablet 120 mg  120 mg Oral QHS    guaiFENesin ER (MUCINEX) tablet 600 mg  600 mg Oral Q12H    sodium chloride (NS) flush 5-40 mL  5-40 mL IntraVENous Q8H    sodium chloride (NS) flush 5-40 mL  5-40 mL IntraVENous PRN    atorvastatin (LIPITOR) tablet 40 mg  40 mg Oral QHS    piperacillin-tazobactam (ZOSYN) 3.375 g in 0.9% sodium chloride (MBP/ADV) 100 mL  3.375 g IntraVENous Q12H    VANCOMYCIN INFORMATION NOTE   Other Rx Dosing/Monitoring         Objective:     Vitals:    02/07/19 0412 02/07/19 0750 02/07/19 0815 02/07/19 0925   BP: 131/60  113/49    Pulse: 77  60    Resp: 18  18    Temp:   96 °F (35.6 °C)    SpO2: 96% 91% 91% 93%   Weight:       Height:           PHYSICAL EXAM     Constitutional:  the patient is well developed and in no acute distress  EENMT:  Sclera clear, pupils equal, oral mucosa moist  Respiratory: bilateral wheezing  Cardiovascular:  RRR without M,G,R  Gastrointestinal: soft and non-tender; with positive bowel sounds. Musculoskeletal: warm without cyanosis. There is 1+ lower leg edema. Skin:  no jaundice or rashes, left arm is red at AV fistula site  Neurologic: no gross neuro deficits     Psychiatric:  alert and oriented x 3    CXR:            Recent Labs     02/07/19  0556 02/06/19  1019   WBC 11.0 10.5   HGB 7.4* 7.6*   HCT 24.0* 24.8*    270     Recent Labs     02/07/19  0556 02/06/19  1019    142   K 4.4 4.2    103   * 159*   CO2 26 27   BUN 66* 54*   CREA 3.30* 3.09*   CA 8.3 8.3   TROIQ  --  <0.02*   ALB  --  3.0*   TBILI  --  0.3   ALT  --  20   SGOT  --  19     No results for input(s): PH, PCO2, PO2, HCO3 in the last 72 hours. No results for input(s): LCAD, LAC in the last 72 hours.     Assessment:  (Medical Decision Making)     Hospital Problems  Date Reviewed: 1/31/2019          Codes Class Noted POA    WILLIAM (obstructive sleep apnea) ICD-10-CM: O24.03  ICD-9-CM: 327.23  2/7/2019 Yes    Overview Signed 2/7/2019 10:49 AM by Neelam Ashraf MD     Not using CPAP  [facility] Dr. Connor Gant at [facility]             Acute on chronic respiratory failure with hypoxia [facility]) ICD-10-CM: J96.21  ICD-9-CM: 518.84, 799.02  2/6/2019 Yes        Cellulitis ICD-10-CM: L03.90  ICD-9-CM: 682.9  2/6/2019 Yes        COPD exacerbation ([facility] [address] ICD-10-CM: J44.1  ICD-9-CM: 491.21  2/6/2019 Yes        Anemia ICD-10-CM: D64.9  ICD-9-CM: 285.9  2/6/2019 Yes        * (Principal) Pulmonary infiltrate in right lung on chest x-ray ICD-10-CM: R91.8  ICD-9-CM: 793.19  2/6/2019 Yes        Severe obesity ([facility] [address] ICD-10-CM: E66.01  ICD-9-CM: 278.01  1/24/2019 Yes        Stage 4 chronic kidney disease ([facility] [address] ICD-10-CM: N18.4  ICD-9-CM: 585.4  1/24/2019 Yes              Plan:  (Medical Decision Making)     --wean O2  --adjust steroids  --increase BD  --cont. ABX,   --agree with vascular evaluation    More than 50% of the time documented was spent in face-to-face contact with the patient and in the care of the patient on the floor/unit where the patient is located. Thank you very much for this referral.  We appreciate the opportunity to participate in this patient's care. Will follow along with above stated plan.     Carrie Solomon MD

## 2019-02-07 NOTE — PROGRESS NOTES
Spiritual Care initial visit made by [facility]. Support given. Card left. SHELBI Patton Div / Bereavement Coordinator

## 2019-02-07 NOTE — PROGRESS NOTES
Care Management Interventions Mode of Transport at Discharge: Self Transition of Care Consult (CM Consult): Home Health [address] No 
Reason Outside [place]: Patient already serviced by other home care/hospice agency Physical Therapy Consult: Yes Occupational Therapy Consult: Yes 
Confirm Follow Up Transport: Self Plan discussed with Pt/Family/Caregiver: Yes Discharge Location Discharge Placement: Home with home health Sw met pt today during care rounds. Pt is an 82 yr old female adm yesterday due to hypoxia, resp failure. Pt is  and is quite over weight. Pt has a daughter and sister in law who also help out. Pt wears 02 at home at 2 1/2 liters cont. Pt stated she is current with [facility] and has Rn, PT, and an Aide. Pt has very limited mobility with a rollator but states she gets very SOB with any exertion. Pt stated she will NOT go to a rehab as she has had horrible experiences with family members going to them. Pt states I will go home and manage with the help of [facility] and my family. Sw to notify [facility] of adm and discharge and cont to follow and assist. Mildred Evans

## 2019-02-07 NOTE — PROGRESS NOTES
Admission assessment complete. A/O x4. Respirations present, non-labored. Bilateral lung sounds coarse, wheezing, no signs of distress noted. 4.5 LPM supplemental oxygen via NC. Oriented to room and surroundings. Bed low, locked, call light in reach. Will continue to monitor.

## 2019-02-07 NOTE — PROGRESS NOTES
02/06/19 1945 Dual Skin Pressure Injury Assessment Dual Skin Pressure Injury Assessment WDL Second Care Provider (Based on [address] [place] [address]) Zoila Haynes Skin Integumentary Skin Integumentary (WDL) WDL

## 2019-02-07 NOTE — PROGRESS NOTES
Respiratory Care Services Policy Number: [id number] Title: Oxygen Protocol Effective Date: 01/1996 Revised Date: 06/2013, 02/29/2016 Reviewed Date: 05/2014, 03/2015, 06/2017 I. Policy: The Oxygen Protocol will be initiated for all patients upon written order from physician for administration of oxygen therapy or if patient is found to have an oxygen saturation of 88% or less. II. Purpose: To provide protocol driven respiratory therapy for the administration of oxygen at concentrations greater than that in ambient air with the intent of treating or preventing the symptoms and manifestations of hypoxia. III. Responsibility: Director Respiratory Care Services, all Respiratory Care Practitioners IV. Indications: A. Implement this protocol for all patients when physician orders oxygen to be administered or when patient is found to have an oxygen saturation of 88% or less. B. To assure routine monitoring of patient's oxygen saturation b.i.d. and to make appropriate adjustments in accordance with ordered oxygen saturation parameters. C. To assure continuity of respiratory care that meets [facility] Clinical Practice Guidelines. V. Assessment:  Assess the following parameters to determine the need to adjust oxygen: A. Measurement of patient's oxygen saturation via pulse oximetry. B. Observation of patient's color, respiratory effort, and responsiveness. C. Measurement of heart rate and respiratory rate. VI. Initiation:  Upon receipt of an order for oxygen, the RCP will: A. Verify order in the patient's EMR, which should include the desired oxygen saturation to be maintained. B. In the event that no saturation is specified, a saturation of 90% will be maintained for all patients with the exception of cardiac patients who will be maintained at 92%. C.  The patient will be placed on oxygen with humidity as ordered by the physician to achieve the prescribed oxygen saturation. D. Patients, who are found to have a SaO2 of 88% or less, may be started on supplemental oxygen as described above. E. The patient will be informed of the \"no smoking policy\" and instructed in the proper use of oxygen therapy. F. Once desired oxygen saturation has been achieved, the P will document FIO2 and oxygen saturation in the respiratory section of the patients EMR. VII. Maintenance: A. 30-second oxygen saturation check will be taken to maintain the saturation ordered by the physician each day. B. Patients will be assessed each shift by pulse oximetry to determine if oxygen needs to be decreased, increased or discontinued. C. If changes in FIO2 are indicated, all changes will be documented in the respiratory section of the patients EMR. D. If no changes in FIO2 are required, the patient's oxygen flow rate and saturation will be recorded in the respiratory section of the patients EMR. E. Per [facility], patients who are receiving oxygen therapy but are not on oxygen at home, should be weaned off oxygen as soon as possible or when anticipated discharge becomes evident. Truett Sack will be discontinued after oxygen saturation has been maintained for 24 hours on room air and documented in the patients EMR. G. Patients on the Inpatient Rehabilitation area on 9th floor will be exempt from having their oxygen discontinued per protocol. Oxygen may be weaned but will be changed to prn to meet the needs of the patient when exercising and participating in physical therapy. VIII. Safety: [facility] will address the following safety issues: A. Identify patient using the two patient identifiers name and birth date via ID bracelet. B. Perform hand hygiene per hospital policy utilizing Standard Precautions for all patients and following transmission-based isolation as indicated per hospital policy. C. For cardiac patients, oxygen will be discontinued by order of physician. D. If a patients FIO2 requirements necessitate changing oxygen delivery devices to a high concentration of oxygen, the ordering physician will be notified. E. If a patient has a hemoglobin level <8 mg. RCP will consult physician before discontinuing oxygen. F. Patients who have an oxygen saturation >95% may be weaned by increments of 10%. G. Patients who have an oxygen saturation <95% may be weaned by increments of 5%. IX. Interventions: A. RCP will assess patient for signs of respiratory distress or suspicion of CO2 retention. B. An ABG may be obtained for patients exhibiting respiratory distress. C. An order should be entered into patients EMR for ABG under per protocol. X. Documentation A. Document assessment findings in the respiratory section of the patients EMR. B. Document changes in therapy per protocol in the respiratory orders section and in the care plan section of the patients EMR. C. Document patient education in the patient education section of the patients EMR. XI. Reportable Conditions:  Report to the physician immediately: A. Acute changes in patient's respiratory status. B. An oxygen saturation <85%. C. A change in oxygen delivery device to provide a high concentration of oxygen. XII. Patient Instructions: Review with Patient A. Purpose of oxygen therapy B. Proper technique for using oxygen C. No smoking policy Approval: Pulmonary Committee (1-25-96) Revision: Chest Committee (4-28-05)

## 2019-02-07 NOTE — PROGRESS NOTES
Hospitalist Progress Note Admit Date:  2019  9:59 AM  
Name:  Sunitha Arroyo Age:  82 y.o. 
:  1935 MRN:  [id number] PCP:  Lillian Ji MD 
Treatment Team: Attending Provider: Tiffanie Gupta; Care Manager: Cornell Martinez Consulting Provider: Rosemary Brown MD; Utilization Review: Corina English RN Subjective:  
CC: Pt 83F with pmhx of CABG x 4, WILLIAM does not use CPAP, CHF, Afib on eliquis and amiodarone, CKD stage 5 (baseline Cr 3-4) s/p recent AVF placement on , DM2 presents with report of increasing wheeezing, cough and fever/chills for last 3-4 days. She has COPD and usually wears 2.5 L during day. Was found hypoxic at 85% on this amount. Now requiring 4lnc. Also noted to have red/warm LUE s/p recent AV graft placement CXR report RLL infiltrate. WBC 10K, hgb 7.6 (usually hgb around 8.5) 
  Hospitalist asked to admit for COPDe and LUE cellulitis. 2019: In bed receiving Duoneb. Tx. Continues with oxygen use 4 liters oxygen saturation 91%. Able to talk in complete sentences. Left arm cellulitis AV fistula remains with slight redness and edema. Audible wheezes noted. Objective:  
 
Patient Vitals for the past 24 hrs: 
 Temp Pulse Resp BP SpO2  
19 0750     91 % 19 0412  77 18 131/60 96 % 19 0311     90 % 19 0012 97 °F (36.1 °C) 87 18 129/83 95 % 19 2247  92     
19 2200  92  134/72   
19 2131     94 % 19 2047 98.2 °F (36.8 °C) 74 20 159/70 94 % 19 1831 98.1 °F (36.7 °C) 95 20 163/69 92 % 19 1620  88 23 121/61 94 % 19 1610  90 19 142/65 92 % 19 1550  88 20 133/52 91 % 19 1530  89 27 157/72 91 % 19 1500  90 24 132/61 91 % 19 1430  87 20 158/70 92 % 19 1037     93 % 19 1030     (!) 88 % 19 1000 98.7 °F (37.1 °C) 94 28 157/68 (!) 78 % Oxygen Therapy O2 Sat (%): 91 % (02/07/19 0750) Pulse via Oximetry: 55 beats per minute (02/07/19 0750) O2 Device: Nasal cannula (02/07/19 0750) O2 Flow Rate (L/min): 4 l/min (02/07/19 0750) FIO2 (%): 36 % (02/07/19 0311) Intake/Output Summary (Last 24 hours) at 2/7/2019 0575 Last data filed at 2/7/2019 1421 Gross per 24 hour Intake  Output 200 ml Net -200 ml Physical Examination: 
General:    Well nourished. Awake and alert. Head:  Normocephalic, atraumatic Eyes:  Extraocular movements intact, normal sclera CV:   RRR. No  Murmurs, clicks, or gallops Lungs:   Unlabored, no cyanosis, audible wheezes throughout. Abdomen:   Soft, nondistended, nontender. Extremities: Warm and dry. No cyanosis or edema. Skin:     No rashes or jaundice. Left arm AV fistula site with slight redness and edema. Neuro:  No gross focal deficits Psych:  Mood and affect appropriate Data Review: 
I have reviewed all labs, meds, telemetry events, and studies from the last 24 hours. Recent Results (from the past 24 hour(s)) FERRITIN Collection Time: 02/06/19 10:18 AM  
Result Value Ref Range Ferritin 94 8 - 388 NG/ML  
TRANSFERRIN Collection Time: 02/06/19 10:18 AM  
Result Value Ref Range Transferrin 226 202 - 364 mg/dL TRANSFERRIN SATURATION Collection Time: 02/06/19 10:18 AM  
Result Value Ref Range Iron 13 ug/dL TIBC 292 250 - 450 ug/dL Transferrin Saturation 4 % CBC WITH AUTOMATED DIFF Collection Time: 02/06/19 10:19 AM  
Result Value Ref Range WBC 10.5 4.3 - 11.1 K/uL  
 RBC 2.55 (L) 4.05 - 5.2 M/uL HGB 7.6 (L) 11.7 - 15.4 g/dL HCT 24.8 (L) 35.8 - 46.3 % MCV 97.3 79.6 - 97.8 FL  
 MCH 29.8 26.1 - 32.9 PG  
 MCHC 30.6 (L) 31.4 - 35.0 g/dL  
 RDW 16.8 (H) 11.9 - 14.6 % PLATELET 815 174 - 555 K/uL MPV 11.3 9.4 - 12.3 FL ABSOLUTE NRBC 0.00 0.0 - 0.2 K/uL  
 DF AUTOMATED NEUTROPHILS 81 (H) 43 - 78 % LYMPHOCYTES 10 (L) 13 - 44 % MONOCYTES 7 4.0 - 12.0 % EOSINOPHILS 2 0.5 - 7.8 % BASOPHILS 0 0.0 - 2.0 % IMMATURE GRANULOCYTES 0 0.0 - 5.0 %  
 ABS. NEUTROPHILS 8.5 (H) 1.7 - 8.2 K/UL  
 ABS. LYMPHOCYTES 1.0 0.5 - 4.6 K/UL  
 ABS. MONOCYTES 0.7 0.1 - 1.3 K/UL  
 ABS. EOSINOPHILS 0.2 0.0 - 0.8 K/UL  
 ABS. BASOPHILS 0.0 0.0 - 0.2 K/UL  
 ABS. IMM. GRANS. 0.0 0.0 - 0.5 K/UL METABOLIC PANEL, COMPREHENSIVE Collection Time: 02/06/19 10:19 AM  
Result Value Ref Range Sodium 142 136 - 145 mmol/L Potassium 4.2 3.5 - 5.1 mmol/L Chloride 103 98 - 107 mmol/L  
 CO2 27 21 - 32 mmol/L Anion gap 12 mmol/L Glucose 159 (H) 65 - 100 mg/dL BUN 54 (H) 8 - 23 MG/DL Creatinine 3.09 (H) 0.6 - 1.0 MG/DL  
 GFR est AA 19 (L) >60 ml/min/1.73m2 GFR est non-AA 15 ml/min/1.73m2 Calcium 8.3 8.3 - 10.4 MG/DL Bilirubin, total 0.3 0.2 - 1.1 MG/DL  
 ALT (SGPT) 20 12 - 65 U/L  
 AST (SGOT) 19 15 - 37 U/L Alk. phosphatase 82 50 - 130 U/L Protein, total 7.0 g/dL Albumin 3.0 (L) 3.2 - 4.6 g/dL Globulin 4.0 (H) 2.3 - 3.5 g/dL A-G Ratio 0.8 TROPONIN I Collection Time: 02/06/19 10:19 AM  
Result Value Ref Range Troponin-I, Qt. <0.02 (L) 0.02 - 0.05 NG/ML  
BNP Collection Time: 02/06/19 10:19 AM  
Result Value Ref Range  pg/mL POC LACTIC ACID Collection Time: 02/06/19 10:29 AM  
Result Value Ref Range Lactic Acid (POC) 1.08 0.5 - 1.9 mmol/L  
EKG, 12 LEAD, INITIAL Collection Time: 02/06/19 10:45 AM  
Result Value Ref Range Ventricular Rate 88 BPM  
 Atrial Rate 88 BPM  
 P-R Interval 168 ms QRS Duration 96 ms  
 Q-T Interval 404 ms QTC Calculation (Bezet) 488 ms Calculated P Axis 21 degrees Calculated R Axis 44 degrees Calculated T Axis 78 degrees Diagnosis Normal sinus rhythm Low voltage QRS Nonspecific ST and T wave abnormality Prolonged QT Abnormal ECG When compared with ECG of 06-FEB-2019 10:40, 
MO interval has decreased Right bundle branch block is no longer Present Confirmed by Shabbir Husain ([id number]) on 2/6/2019 12:01:39 PM 
  
GLUCOSE, POC Collection Time: 02/06/19  6:44 PM  
Result Value Ref Range Glucose (POC) 455 (HH) 65 - 100 mg/dL HEMOGLOBIN A1C WITH EAG Collection Time: 02/06/19  8:11 PM  
Result Value Ref Range Hemoglobin A1c 7.8 % Est. average glucose 177 mg/dL GLUCOSE, POC Collection Time: 02/06/19  9:39 PM  
Result Value Ref Range Glucose (POC) 436 (H) 65 - 100 mg/dL INFLUENZA A & B AG (RAPID TEST) Collection Time: 02/07/19 12:59 AM  
Result Value Ref Range Influenza A Ag NEGATIVE  NEG Influenza B Ag NEGATIVE  NEG Source NASOPHARYNGEAL    
CBC W/O DIFF Collection Time: 02/07/19  5:56 AM  
Result Value Ref Range WBC 11.0 4.3 - 11.1 K/uL  
 RBC 2.52 (L) 4.05 - 5.2 M/uL HGB 7.4 (L) 11.7 - 15.4 g/dL HCT 24.0 (L) 35.8 - 46.3 % MCV 95.2 79.6 - 97.8 FL  
 MCH 29.4 26.1 - 32.9 PG  
 MCHC 30.8 (L) 31.4 - 35.0 g/dL  
 RDW 16.3 (H) 11.9 - 14.6 % PLATELET 415 522 - 698 K/uL MPV 11.7 9.4 - 12.3 FL ABSOLUTE NRBC 0.00 0.0 - 0.2 K/uL METABOLIC PANEL, BASIC Collection Time: 02/07/19  5:56 AM  
Result Value Ref Range Sodium 140 136 - 145 mmol/L Potassium 4.4 3.5 - 5.1 mmol/L Chloride 103 98 - 107 mmol/L  
 CO2 26 21 - 32 mmol/L Anion gap 11 mmol/L Glucose 236 (H) 65 - 100 mg/dL BUN 66 (H) 8 - 23 MG/DL Creatinine 3.30 (H) 0.6 - 1.0 MG/DL  
 GFR est AA 17 (L) >60 ml/min/1.73m2 GFR est non-AA 14 ml/min/1.73m2 Calcium 8.3 8.3 - 10.4 MG/DL  
GLUCOSE, POC Collection Time: 02/07/19  7:34 AM  
Result Value Ref Range Glucose (POC) 328 (H) 65 - 100 mg/dL All Micro Results Procedure Component Value Units Date/Time INFLUENZA A & B AG (RAPID TEST) [[id number]] Collected:  02/07/19 0059 Order Status:  Completed Specimen:  Nasopharyngeal from Nasal washing Updated:  02/07/19 0121   Influenza A Ag NEGATIVE      
 Comment: NEGATIVE FOR THE PRESENCE OF INFLUENZA A ANTIGEN 
INFECTION DUE TO INFLUENZA A CANNOT BE RULED OUT. BECAUSE THE ANTIGEN PRESENT IN THE SAMPLE MAY BE BELOW 
THE DETECTION LIMIT OF THE TEST. A NEGATIVE TEST IS PRESUMPTIVE AND IT IS RECOMMENDED THAT THESE RESULTS BE CONFIRMED BY VIRAL CULTURE OR AN FDA-CLEARED INFLUENZA A AND B MOLECULAR ASSAY. Influenza B Ag NEGATIVE Comment: NEGATIVE FOR THE PRESENCE OF INFLUENZA B ANTIGEN 
INFECTION DUE TO INFLUENZA B CANNOT BE RULED OUT. BECAUSE THE ANTIGEN PRESENT IN THE SAMPLE MAY BE BELOW 
THE DETECTION LIMIT OF THE TEST. A NEGATIVE TEST IS PRESUMPTIVE AND IT IS RECOMMENDED THAT THESE RESULTS BE CONFIRMED BY VIRAL CULTURE OR AN FDA-CLEARED INFLUENZA A AND B MOLECULAR ASSAY. Source NASOPHARYNGEAL     
 CULTURE, RESPIRATORY/SPUTUM/BRONCH Tawny Neville STAIN [[id number]] Order Status:  Sent Specimen:  Sputum CULTURE, BLOOD [[id number]] Collected:  02/06/19 1041 Order Status:  Completed Specimen:  Whole Blood Updated:  02/06/19 1048 CULTURE, BLOOD [[id number]] Collected:  02/06/19 1041 Order Status:  Completed Specimen:  Whole Blood Updated:  02/06/19 1048 Current Meds: 
Current Facility-Administered Medications Medication Dose Route Frequency  albuterol-ipratropium (DUO-NEB) 2.5 MG-0.5 MG/3 ML  3 mL Nebulization Q6H RT  
 allopurinol (ZYLOPRIM) tablet 100 mg  100 mg Oral DAILY  amiodarone (CORDARONE) tablet 200 mg  200 mg Oral DAILY  apixaban (ELIQUIS) tablet 2.5 mg  2.5 mg Oral Q12H  aspirin delayed-release tablet 81 mg  81 mg Oral DAILY  loratadine (CLARITIN) tablet 10 mg  10 mg Oral DAILY  ferrous sulfate tablet 325 mg  325 mg Oral ACB  fluticasone (FLONASE) 50 mcg/actuation nasal spray 2 Spray  2 Spray Both Nostrils DAILY  furosemide (LASIX) tablet 40 mg  40 mg Oral DAILY  budesonide (PULMICORT) 500 mcg/2 ml nebulizer suspension  500 mcg Nebulization BID RT  
  insulin lispro (HUMALOG) injection   SubCUTAneous AC&HS  
 LORazepam (ATIVAN) tablet 0.5 mg  0.5 mg Oral TID PRN  
 montelukast (SINGULAIR) tablet 10 mg  10 mg Oral QHS  pantoprazole (PROTONIX) tablet 40 mg  40 mg Oral ACB  potassium chloride (KLOR-CON) tablet 10 mEq  10 mEq Oral DAILY  traMADol (ULTRAM) tablet 50 mg  50 mg Oral Q8H PRN  
 verapamil ER (CALAN-SR) tablet 120 mg  120 mg Oral QHS  guaiFENesin ER (MUCINEX) tablet 600 mg  600 mg Oral Q12H  
 methylPREDNISolone (PF) (SOLU-MEDROL) injection 40 mg  40 mg IntraVENous Q6H  
 sodium chloride (NS) flush 5-40 mL  5-40 mL IntraVENous Q8H  
 sodium chloride (NS) flush 5-40 mL  5-40 mL IntraVENous PRN  
 atorvastatin (LIPITOR) tablet 40 mg  40 mg Oral QHS  piperacillin-tazobactam (ZOSYN) 3.375 g in 0.9% sodium chloride (MBP/ADV) 100 mL  3.375 g IntraVENous Q12H  VANCOMYCIN INFORMATION NOTE   Other Rx Dosing/Monitoring Diet: DIET DIABETIC CONSISTENT CARB Other Studies (last 24 hours): Xr Chest [facility] Result Date: 2/6/2019 CHEST X-RAY, one view. HISTORY:  Worsening shortness breath and cough. TECHNIQUE:  AP upright upper view. COMPARISON: None. FINDINGS:  The lungs demonstrate some bibasilar densities at the right, atelectasis or infiltrate. . The heart size is enlarged. The costophrenic angles are sharp. The pulmonary vasculature is unremarkable. Included portion of the upper abdomen is unremarkable. There are sternal wires, some of which are probably fractured. IMPRESSION:  Borderline cardiomegaly with some atelectasis or infiltrate right base. Assessment and Plan:  
 
Hospital Problems as of 2/7/2019 Date Reviewed: 1/31/2019 Codes Class Noted - Resolved POA Acute on chronic respiratory failure with hypoxia [facility]) ICD-10-CM: J96.21 
ICD-9-CM: 518.84, 799.02  2/6/2019 - Present Yes Cellulitis ICD-10-CM: L03.90 ICD-9-CM: 682.9  2/6/2019 - Present Yes COPD exacerbation ([facility] ICD-10-CM: J44.1 ICD-9-CM: 491.21  2/6/2019 - Present Yes Anemia ICD-10-CM: D64.9 ICD-9-CM: 285.9  2/6/2019 - Present Yes * (Principal) Community acquired pneumonia ICD-10-CM: J18.9 ICD-9-CM: 341  2/6/2019 - Present Yes Severe obesity ([facility] ICD-10-CM: E66.01 
ICD-9-CM: 278.01  1/24/2019 - Present Yes Stage 4 chronic kidney disease ([facility] ICD-10-CM: N18.4 ICD-9-CM: 585.4  1/24/2019 - Present Yes A/P:   
 [facility] 02/06/2019  Cellulitis 02/06/2019  COPD exacerbation ([facility] 02/06/2019  Anemia 02/06/2019  Severe obesity ([facility] 01/24/2019  Stage 4 chronic kidney disease ([facility] 01/24/2019  
  
A/P 
- Acute/chronic resp failure - usual o2 requirement of 2.5 L now requiring 4L. Wean as tolerated 
- COPDe - ck flu, IV solumedrol, scheduled nebs, mucinex and flutter valve - RLL Community acquired pneumonia -vanco/zosyn (this choice was also for cellulitis). Likely to wean to orals soon if cellulitis improved. Follow cultures - Cellulitis - LUE s/p AVF graft. Request vascular to eval. Vanco/zosyn. - CKD stage 5 - Cr baseline. Monitor. No indications for HD today 
- CHF - uncertain of type. Seemingly compensated. . Continues to receive Lasix 40 mg daily by mouth. 
- afib - on eliquis and amiodarone Anemia: Likely chronic disease. No obvious signs of bleeding. Will trend, TIBC, Ferritin, Transferrin, Iron.  
 
 
 
Signed: 
LUKE Bell 
 Alert and oriented to person, place, time/situation. normal mood and affect. no apparent risk to self or others.

## 2019-02-07 NOTE — PROGRESS NOTES
02/06/19 1945 Dual Skin Pressure Injury Assessment Dual Skin Pressure Injury Assessment WDL Second Care Provider (Based on [address] [place] [address]) Paul Moseley

## 2019-02-07 NOTE — PROGRESS NOTES
Shift assessment complete. Pt resting in bed. A&Ox4. Respirations present, even, unlabored. Wheezing heard throughout. Oxygen in place. HR regular, S1&S2 auscultated. IV capped and patent. Left arm red, AV fistula. Bed in lowest position, call light within reach, side rails x3. Will continue to monitor throughout the shift.

## 2019-02-07 NOTE — PROGRESS NOTES
Received call for VR nurse, and have passed on the information to the appropiate  about pt observation status and in need of a code 40 letter.

## 2019-02-07 NOTE — PHYSICIAN ADVISORY
Letter of Determination:  Outpatient status receiving Observation Services This patient was originally hospitalized as Inpatient Status on 2/6/2019 for acute respiratory failure. At this time this patient does not appear to meet the medical necessity requirements to support an inpatient level of care. It is our recommendation that this patient's hospitalization status should be changed from INPATIENT to [place] receiving OBSERVATION services via Condition Code 44.  
  
This may change due to the medical condition of the patient and new clinical evidence as the patients care progreses. The final decision regarding the patient's hospitalization status depends on the attending physician's judgement. Quentin Benavides MD, SANDHYA, Physician Advisor [id number].

## 2019-02-07 NOTE — PROGRESS NOTES
Utilization Review Physician has recommended this patient is most appropriate as Outpatient Status receiving Observation Services. The Attending provider agreed and an outpatient order was placed on the chart as the Attending Provider requested. The patient will be provided the documentation for a Condition Code 44, conversion from Inpatient to Outpatient Status, and questions answered. The MOON letter explaining the outpatient/observation services was given to patient/family representative with verbal explanation and verbal understanding was received about information given. Letter signed by patient with date and time. Signed copy placed in the patient's chart for scanning by HIS and copy left at bedside for patient information.

## 2019-02-07 NOTE — PROGRESS NOTES
02/06/19 1945 Skin Integumentary Skin Integumentary (WDL) X Skin Color Red (Left arm cellulitis) Skin Condition/Temp Dry; Warm  
Skin Integrity Intact

## 2019-02-07 NOTE — CONSULTS
[address] [facility]  [phone]    History and Physical / Surgical Consultation   Ilya Tran date: 2019    MRN: [id number]     : 1935     Age: 82 y.o.          2019 3:39 PM    Subjective/HPI:   This patient is a 82 y.o. white female seen at the request of Rancho Barbosa DO and evaluated for erythematous, edematous left forearm. PMH with CAD s/p 4v CABG, CHF, HTN, pAF on amiodarone and Eliquis, DM2, WILLIAM without CPAP, CKD5, COPD on 2.5L O2 continuous and GERD / PUD, she is s/p left forearm AVG placement on 2019 by Bridgett Thayer MD. Patient presented to the ED yesterday with worsening dyspnea, wheezing and cough, was admitted for COPD exacerbation and started on IV ABX (vanc+Zosyn). There was concern for L UE cellulitis, and we were consulted. Patient was seen today during PT evaluation, markedly dyspneic after exertion. She reports erythema, edema and pain in left forearm \"much better\" today. She notes daily improvement since surgery. She denies dysesthesia, loss of sensation or function in L UE. She had been propping her left arm at home post-operatively. No further chills or sweats since admission. Patient's past surgical, family and social histories were reviewed as noted here and below. Review of Systems  A comprehensive review of systems was negative except for that written in the HPI.     Past Medical History:   Diagnosis Date    Adverse effect of anesthesia     Combative    Anemia     Anxiety     Asthma     Atrial flutter (HCC)     Takes Eliquis    CAD (coronary artery disease) 2013    CABG x4    Cancer (HCC)     Uterine    Chronic kidney disease     Chronic obstructive pulmonary disease (HCC)     wears oxygen 2.5 liters continuous    Chronic pain     Lower back    Constipation     DDD (degenerative disc disease), lumbar     Depression     Fatty liver     GERD (gastroesophageal reflux disease)  Heart abnormality     Heart failure (HCC)     Hypercholesterolemia     Hypertension     Kidney disease     Morbid obesity (HCC)     Nausea & vomiting     Osteoarthritis     PUD (peptic ulcer disease)     Sleep apnea     no CPAP, wears 2 liters oxygen at bedtime    Type 2 diabetes mellitus (HCC)     A1C 7.1 on 2018, , Low 80's,    Vitamin B12 deficiency       Past Surgical History:   Procedure Laterality Date    CABG, ARTERY-VEIN, FOUR      HX APPENDECTOMY      HX COLONOSCOPY      HX GYN      ectopic pregnancy    HX HEART CATHETERIZATION      HX HYSTERECTOMY      HX OTHER SURGICAL  1992    neck spurs    HX TONSILLECTOMY      NEUROLOGICAL PROCEDURE UNLISTED      diskectomy    VASCULAR SURGERY PROCEDURE UNLIST Right     right carotidendarectomy      Allergies   Allergen Reactions    Alprazolam Drowsiness    Demerol [Meperidine] Unknown (comments)    Levaquin [Levofloxacin] Rash    Oxycodone Unknown (comments) and Nausea and Vomiting    Pepcid [Famotidine] Nausea and Vomiting    Phenergan [Promethazine] Unknown (comments) and Other (comments)     Turns red and swells    Sitagliptin Unknown (comments)    Sulfa (Sulfonamide Antibiotics) Nausea and Vomiting      Social History     Tobacco Use    Smoking status: Former Smoker     Packs/day: 1.00     Years: 40.00     Pack years: 40.00     Last attempt to quit:      Years since quittin.1    Smokeless tobacco: Never Used   Substance Use Topics    Alcohol use: No      Social History     Social History Narrative    Not on file     Family History   Problem Relation Age of Onset    Cancer Mother         breast    Diabetes Mother     Heart Disease Mother     Diabetes Father     Heart Disease Father       Prior to Admission Medications   Prescriptions Last Dose Informant Patient Reported? Taking?    HYDROcodone-acetaminophen (NORCO)  mg tablet   Yes No   Sig: Take 1 Tab by mouth every eight (8) hours as needed for Pain. LORazepam (ATIVAN) 1 mg tablet   Yes No   Si.5 mg three (3) times daily as needed for Anxiety. Omeprazole delayed release (PRILOSEC D/R) 20 mg tablet   Yes No   Sig: Take 20 mg by mouth daily. albuterol (PROVENTIL VENTOLIN) 2.5 mg /3 mL (0.083 %) nebulizer solution   Yes No   Si.5 mg by Nebulization route every six (6) hours as needed. albuterol (VENTOLIN HFA) 90 mcg/actuation inhaler   Yes No   Sig: Take 2 Puffs by inhalation every six (6) hours as needed for Wheezing. allopurinol (ZYLOPRIM) 100 mg tablet   Yes No   Sig: Take 100 mg by mouth daily. amiodarone (CORDARONE) 200 mg tablet   Yes No   Sig: Take 200 mg by mouth daily. apixaban (ELIQUIS) 2.5 mg tablet   Yes No   Sig: Take 2.5 mg by mouth two (2) times a day. aspirin delayed-release 81 mg tablet   Yes No   Sig: Take 81 mg by mouth daily. atorvastatin (LIPITOR) 40 mg tablet   Yes No   Sig: Take 40 mg by mouth daily. cetirizine (ZYRTEC) 10 mg tablet   Yes No   Sig: Take 10 mg by mouth daily. cholecalciferol (VITAMIN D3) 1,000 unit cap   Yes No   Sig: Take 1,000 Units by mouth daily. ferrous sulfate 325 mg (65 mg iron) tablet   Yes No   Sig: Take 325 mg by mouth Daily (before breakfast). fluticasone (FLONASE ALLERGY RELIEF) 50 mcg/actuation nasal spray   Yes No   Si Sprays by Both Nostrils route daily. fluticasone-vilanterol (BREO ELLIPTA) 100-25 mcg/dose inhaler   Yes No   Sig: Take 1 Puff by inhalation daily. furosemide (LASIX) 40 mg tablet   Yes No   Sig: Take 40 mg by mouth daily. glimepiride (AMARYL) 1 mg tablet   Yes No   Sig: Take 1 mg by mouth Daily (before breakfast). montelukast (SINGULAIR) 10 mg tablet   Yes No   Sig: Take 10 mg by mouth daily. nitroglycerin (NITROSTAT) 0.4 mg SL tablet   Yes No   Si.4 mg by SubLINGual route every five (5) minutes as needed for Chest Pain. Up to 3 doses. potassium chloride SA (MICRO-K) 10 mEq capsule   Yes No   Sig: Take 10 mEq by mouth daily. traMADol (ULTRAM) 50 mg tablet   Yes No   Sig: Take 50 mg by mouth every eight (8) hours as needed for Pain.   umeclidinium (INCRUSE ELLIPTA) 62.5 mcg/actuation inhaler   Yes No   Sig: Take 1 Puff by inhalation daily. verapamil SR (CALAN-SR) 240 mg CR tablet   Yes No   Sig: Take 120 mg by mouth nightly.  Takes 1/2 tab      Facility-Administered Medications: None     Current Facility-Administered Medications   Medication Dose Route Frequency    albuterol-ipratropium (DUO-NEB) 2.5 MG-0.5 MG/3 ML  3 mL Nebulization Q4H RT    methylPREDNISolone (PF) (SOLU-MEDROL) injection 40 mg  40 mg IntraVENous Q8H    allopurinol (ZYLOPRIM) tablet 100 mg  100 mg Oral DAILY    amiodarone (CORDARONE) tablet 200 mg  200 mg Oral DAILY    apixaban (ELIQUIS) tablet 2.5 mg  2.5 mg Oral Q12H    aspirin delayed-release tablet 81 mg  81 mg Oral DAILY    loratadine (CLARITIN) tablet 10 mg  10 mg Oral DAILY    ferrous sulfate tablet 325 mg  325 mg Oral ACB    fluticasone (FLONASE) 50 mcg/actuation nasal spray 2 Spray  2 Spray Both Nostrils DAILY    furosemide (LASIX) tablet 40 mg  40 mg Oral DAILY    budesonide (PULMICORT) 500 mcg/2 ml nebulizer suspension  500 mcg Nebulization BID RT    insulin lispro (HUMALOG) injection   SubCUTAneous AC&HS    LORazepam (ATIVAN) tablet 0.5 mg  0.5 mg Oral TID PRN    montelukast (SINGULAIR) tablet 10 mg  10 mg Oral QHS    pantoprazole (PROTONIX) tablet 40 mg  40 mg Oral ACB    potassium chloride (KLOR-CON) tablet 10 mEq  10 mEq Oral DAILY    traMADol (ULTRAM) tablet 50 mg  50 mg Oral Q8H PRN    verapamil ER (CALAN-SR) tablet 120 mg  120 mg Oral QHS    guaiFENesin ER (MUCINEX) tablet 600 mg  600 mg Oral Q12H    sodium chloride (NS) flush 5-40 mL  5-40 mL IntraVENous Q8H    sodium chloride (NS) flush 5-40 mL  5-40 mL IntraVENous PRN    atorvastatin (LIPITOR) tablet 40 mg  40 mg Oral QHS    piperacillin-tazobactam (ZOSYN) 3.375 g in 0.9% sodium chloride (MBP/ADV) 100 mL  3.375 g IntraVENous Q12H    VANCOMYCIN INFORMATION NOTE   Other Rx Dosing/Monitoring     Objective:     Vitals:    02/07/19 0815 02/07/19 0925 02/07/19 1127 02/07/19 1130   BP: 113/49   113/46   Pulse: 60   60   Resp: 18   18   Temp: 96 °F (35.6 °C)   97 °F (36.1 °C)   SpO2: 91% 93% 90% 91%   Weight:       Height:           Physical Exam:   General obese, dyspneic, pleasant  Skin  (see below for L UE) warm, thin, poor texture and turgor, no jaundice or rashes  HEENT normocephalic, extraocular muscles intact, oral mucosa moist  Neck  supple without adenopathy, trachea midline  Lungs  respirations labored, coarse breath sounds  Heart  regular rate and rhythm, no appreciable murmurs  Abdomen soft, protuberant but non-distended, non-tender; bowel sounds normoactive  Extremities warm without cyanosis; left forearm with 2 incisions intact with tissue adhesive, palpable thrill and audible bruit in both limbs of loop graft, very mild erythema, resolving old bruising, minimal warmth  Pulses  2+ palpable and symmetric radial pulses, as well as palpable DP and PT pulses  Neurological alert and oriented; no gross sensorimotor deficits     Data Review   Recent Labs     02/07/19  0556 02/06/19  1019   WBC 11.0 10.5   HGB 7.4* 7.6*   HCT 24.0* 24.8*    270     Recent Labs     02/07/19  0556 02/06/19  1019    142   K 4.4 4.2    103   CO2 26 27   * 159*   BUN 66* 54*   CREA 3.30* 3.09*   TROIQ  --  <0.02*       Assessment / Plan / Recommendations / Medical Decision Making:     Hospital Problems  Date Reviewed: 1/31/2019          Codes Class Noted POA    WILLIAM (obstructive sleep apnea) ICD-10-CM: F82.17  ICD-9-CM: 327.23  2/7/2019 Yes    Overview Signed 2/7/2019 10:49 AM by Alda Nguyen MD     Not using CPAP  See Dr. Akin Almaraz at [facility]             Acute on chronic respiratory failure with hypoxia ([facility] [address] ICD-10-CM: H79.48  ICD-9-CM: 518.84, 799.02  2/6/2019 Yes        Cellulitis ICD-10-CM: L03.90  ICD-9-CM: 682.9  2/6/2019 Yes        COP[facility] exacerbation ([facility] ICD-10-CM: J44.1  ICD-9-CM: 491.21  2/6/2019 Yes        Anemia ICD-10-CM: D64.9  ICD-9-CM: 285.9  2/6/2019 Yes        * (Principal) Pulmonary infiltrate in right lung on chest x-ray ICD-10-CM: R91.8  ICD-9-CM: 793.19  2/6/2019 Yes        Severe obesity ([facility] [address] ICD-10-CM: E66.01  ICD-9-CM: 278.01  1/24/2019 Yes        Stage 4 chronic kidney disease ([facility] [address] ICD-10-CM: N18.4  ICD-9-CM: 585.4  1/24/2019 Yes              Patient Active Problem List    Diagnosis Date Noted    WILLIAM (obstructive sleep apnea) 02/07/2019    Acute on chronic respiratory failure with hypoxia ([facility] [address] 02/06/2019    Cellulitis 02/06/2019    COPD exacerbation ([facility] 02/06/2019    Anemia 02/06/2019    Pulmonary infiltrate in right lung on chest x-ray 02/06/2019    Severe obesity ([facility] [address] 01/24/2019    Stage 4 chronic kidney disease ([facility] [address] 01/24/2019    Acquired cyst of kidney 04/02/2015    Hydronephrosis 02/19/2014    Incomplete bladder emptying 02/19/2014    Mixed incontinence urge and stress (male)(female) 02/19/2014         Allie Holm is a 82 y.o. female with COPD exacerbation and moderate edema, mild erythema and pain at L UE AVG placement site. Left forearm AVG with + bruit and thrill, 2+ distal pulse, normal post-op appearance. Does not appear infected. - Use pain medications as appropriate  - Continue to elevate L UE  - May apply cool compresses for patient comfort  - Monitor and notify our service of any deterioration. Otherwise patient is to keep her previously [place] our [place] 2/28/2019 at 11:30 AM with Dr. Tiara Gilliam. Thank you very much for this referral. We appreciate the opportunity to participate in this patient's care. We will follow along with above stated plan. Radha Reis PA-C  Physician Assistant with [facility] Vascular Surgery  [facility].  Sadia Cameron MD / Swathi Teague MD

## 2019-02-07 NOTE — PROGRESS NOTES
02/07/19 0664 Oxygen Therapy O2 Sat (%) 93 % Pulse via Oximetry 55 beats per minute O2 Device Other (comment) (airvo) O2 Flow Rate (L/min) 40 l/min O2 Temperature 87.8 °F (31 °C) FIO2 (%) 46 % Patient placed on airvo due to SOB. Patient tolerating at this time, b/s are expiratory wheezing b/l.

## 2019-02-07 NOTE — PROGRESS NOTES
Problem: Falls - Risk of 
Goal: *Absence of Falls Document Sonido Og Fall Risk and appropriate interventions in the flowsheet. Outcome: Progressing Towards Goal 
Fall Risk Interventions: 
Mobility Interventions: Bed/chair exit alarm Medication Interventions: Bed/chair exit alarm Elimination Interventions: Call light in reach

## 2019-02-07 NOTE — PROGRESS NOTES
Vancomycin Consult MD ordering: Ross DUBON following? no Indication: SSTI 
DOT:  7?  days Goal level(s): 15 - 20 (DM) Ht Readings from Last 1 Encounters:  
19 5' 2\" (1.575 m) Wt Readings from Last 1 Encounters:  
19 106.6 kg (235 lb) Temp (24hrs), Av.7 °F (37.1 °C), Min:98.7 °F (37.1 °C), Max:98.7 °F (37.1 °C) Dosing weight: 106 kg 
82 y.o. Date:  Dose/Freq Admin Times Level/Time:  
 2500 mg LD 1851   
   Random level ordered for 1700 Recent Labs 19 
1029 19 
1019 BUN  --  54* CREA  --  3.09* WBC  --  10.5 LACPOC 1.08  --   
 
Estimated Creatinine Clearance: 16.4 mL/min (A) (based on SCr of 3.09 mg/dL (H)). Day 2 Assessment and Plan: 
Random level ordered for today at 1700.   If level less than 20, may want to consider ordering a one time dose of Vancomycin 1 gram. 
 
Cheyenne Stone PharmD, BCPS

## 2019-02-08 PROBLEM — N18.9 ANEMIA OF RENAL DISEASE: Status: ACTIVE | Noted: 2019-02-08

## 2019-02-08 PROBLEM — D63.1 ANEMIA OF RENAL DISEASE: Status: ACTIVE | Noted: 2019-02-08

## 2019-02-08 PROBLEM — E11.65 UNCONTROLLED TYPE 2 DIABETES MELLITUS WITH HYPERGLYCEMIA (HCC): Status: ACTIVE | Noted: 2019-02-08

## 2019-02-08 LAB
ALBUMIN SERPL-MCNC: 2.8 G/DL (ref 3.2–4.6)
ALBUMIN/GLOB SERPL: 0.8 {RATIO}
ALP SERPL-CCNC: 126 U/L (ref 50–130)
ALT SERPL-CCNC: 98 U/L (ref 12–65)
ANION GAP SERPL CALC-SCNC: 11 MMOL/L
AST SERPL-CCNC: 92 U/L (ref 15–37)
BASOPHILS # BLD: 0 K/UL (ref 0–0.2)
BASOPHILS NFR BLD: 0 % (ref 0–2)
BILIRUB SERPL-MCNC: 0.3 MG/DL (ref 0.2–1.1)
BUN SERPL-MCNC: 82 MG/DL (ref 8–23)
CALCIUM SERPL-MCNC: 8.3 MG/DL (ref 8.3–10.4)
CHLORIDE SERPL-SCNC: 102 MMOL/L (ref 98–107)
CO2 SERPL-SCNC: 26 MMOL/L (ref 21–32)
CREAT SERPL-MCNC: 3.84 MG/DL (ref 0.6–1)
DIFFERENTIAL METHOD BLD: ABNORMAL
EOSINOPHIL # BLD: 0 K/UL (ref 0–0.8)
EOSINOPHIL NFR BLD: 0 % (ref 0.5–7.8)
ERYTHROCYTE [DISTWIDTH] IN BLOOD BY AUTOMATED COUNT: 16.3 % (ref 11.9–14.6)
GLOBULIN SER CALC-MCNC: 3.7 G/DL (ref 2.3–3.5)
GLUCOSE BLD STRIP.AUTO-MCNC: 304 MG/DL (ref 65–100)
GLUCOSE BLD STRIP.AUTO-MCNC: 363 MG/DL (ref 65–100)
GLUCOSE BLD STRIP.AUTO-MCNC: 400 MG/DL (ref 65–100)
GLUCOSE BLD STRIP.AUTO-MCNC: 483 MG/DL (ref 65–100)
GLUCOSE BLD STRIP.AUTO-MCNC: 496 MG/DL (ref 65–100)
GLUCOSE SERPL-MCNC: 316 MG/DL (ref 65–100)
HCT VFR BLD AUTO: 23.1 % (ref 35.8–46.3)
HGB BLD-MCNC: 7.2 G/DL (ref 11.7–15.4)
IMM GRANULOCYTES # BLD AUTO: 0.1 K/UL (ref 0–0.5)
IMM GRANULOCYTES NFR BLD AUTO: 1 % (ref 0–5)
LYMPHOCYTES # BLD: 0.7 K/UL (ref 0.5–4.6)
LYMPHOCYTES NFR BLD: 5 % (ref 13–44)
MCH RBC QN AUTO: 29 PG (ref 26.1–32.9)
MCHC RBC AUTO-ENTMCNC: 31.2 G/DL (ref 31.4–35)
MCV RBC AUTO: 93.1 FL (ref 79.6–97.8)
MONOCYTES # BLD: 0.3 K/UL (ref 0.1–1.3)
MONOCYTES NFR BLD: 2 % (ref 4–12)
NEUTS SEG # BLD: 11.7 K/UL (ref 1.7–8.2)
NEUTS SEG NFR BLD: 92 % (ref 43–78)
NRBC # BLD: 0.03 K/UL (ref 0–0.2)
PHOSPHATE SERPL-MCNC: 5.3 MG/DL (ref 2.3–3.7)
PLATELET # BLD AUTO: 297 K/UL (ref 150–450)
PMV BLD AUTO: 11.5 FL (ref 9.4–12.3)
POTASSIUM SERPL-SCNC: 4.8 MMOL/L (ref 3.5–5.1)
PROT SERPL-MCNC: 6.5 G/DL
RBC # BLD AUTO: 2.48 M/UL (ref 4.05–5.2)
SODIUM SERPL-SCNC: 139 MMOL/L (ref 136–145)
VANCOMYCIN SERPL-MCNC: 20.3 UG/ML
WBC # BLD AUTO: 12.7 K/UL (ref 4.3–11.1)

## 2019-02-08 PROCEDURE — 85025 COMPLETE CBC W/AUTO DIFF WBC: CPT

## 2019-02-08 PROCEDURE — 82962 GLUCOSE BLOOD TEST: CPT

## 2019-02-08 PROCEDURE — 80053 COMPREHEN METABOLIC PANEL: CPT

## 2019-02-08 PROCEDURE — 97165 OT EVAL LOW COMPLEX 30 MIN: CPT

## 2019-02-08 PROCEDURE — 77030021668 HC NEB PREFIL KT VYRM -A

## 2019-02-08 PROCEDURE — 94760 N-INVAS EAR/PLS OXIMETRY 1: CPT

## 2019-02-08 PROCEDURE — 74011000250 HC RX REV CODE- 250: Performed by: INTERNAL MEDICINE

## 2019-02-08 PROCEDURE — 74011250636 HC RX REV CODE- 250/636: Performed by: INTERNAL MEDICINE

## 2019-02-08 PROCEDURE — P9016 RBC LEUKOCYTES REDUCED: HCPCS

## 2019-02-08 PROCEDURE — 36430 TRANSFUSION BLD/BLD COMPNT: CPT

## 2019-02-08 PROCEDURE — 86923 COMPATIBILITY TEST ELECTRIC: CPT

## 2019-02-08 PROCEDURE — 99215 OFFICE O/P EST HI 40 MIN: CPT | Performed by: INTERNAL MEDICINE

## 2019-02-08 PROCEDURE — 99218 HC RM OBSERVATION: CPT

## 2019-02-08 PROCEDURE — 94640 AIRWAY INHALATION TREATMENT: CPT

## 2019-02-08 PROCEDURE — 77030039270 HC TU BLD FLTR CARD -A

## 2019-02-08 PROCEDURE — 80202 ASSAY OF VANCOMYCIN: CPT

## 2019-02-08 PROCEDURE — 97530 THERAPEUTIC ACTIVITIES: CPT

## 2019-02-08 PROCEDURE — 74011636637 HC RX REV CODE- 636/637: Performed by: INTERNAL MEDICINE

## 2019-02-08 PROCEDURE — 74011250637 HC RX REV CODE- 250/637: Performed by: INTERNAL MEDICINE

## 2019-02-08 PROCEDURE — 84100 ASSAY OF PHOSPHORUS: CPT

## 2019-02-08 PROCEDURE — 74011000258 HC RX REV CODE- 258: Performed by: INTERNAL MEDICINE

## 2019-02-08 PROCEDURE — 30233N1 TRANSFUSION OF NONAUTOLOGOUS RED BLOOD CELLS INTO PERIPHERAL VEIN, PERCUTANEOUS APPROACH: ICD-10-PCS | Performed by: FAMILY MEDICINE

## 2019-02-08 PROCEDURE — 77010033711 HC HIGH FLOW OXYGEN

## 2019-02-08 PROCEDURE — 36415 COLL VENOUS BLD VENIPUNCTURE: CPT

## 2019-02-08 PROCEDURE — 74011250636 HC RX REV CODE- 250/636: Performed by: FAMILY MEDICINE

## 2019-02-08 PROCEDURE — 86900 BLOOD TYPING SEROLOGIC ABO: CPT

## 2019-02-08 PROCEDURE — 77030020256 HC SOL INJ NACL 0.9%  500ML

## 2019-02-08 RX ORDER — HYDRALAZINE HYDROCHLORIDE 20 MG/ML
20 INJECTION INTRAMUSCULAR; INTRAVENOUS
Status: DISCONTINUED | OUTPATIENT
Start: 2019-02-08 | End: 2019-02-13 | Stop reason: HOSPADM

## 2019-02-08 RX ORDER — INSULIN GLARGINE 100 [IU]/ML
10 INJECTION, SOLUTION SUBCUTANEOUS DAILY
Status: DISCONTINUED | OUTPATIENT
Start: 2019-02-08 | End: 2019-02-09

## 2019-02-08 RX ORDER — SODIUM CHLORIDE 9 MG/ML
250 INJECTION, SOLUTION INTRAVENOUS AS NEEDED
Status: DISCONTINUED | OUTPATIENT
Start: 2019-02-08 | End: 2019-02-13 | Stop reason: HOSPADM

## 2019-02-08 RX ORDER — FUROSEMIDE 10 MG/ML
40 INJECTION INTRAMUSCULAR; INTRAVENOUS ONCE
Status: COMPLETED | OUTPATIENT
Start: 2019-02-08 | End: 2019-02-08

## 2019-02-08 RX ORDER — INSULIN LISPRO 100 [IU]/ML
14 INJECTION, SOLUTION INTRAVENOUS; SUBCUTANEOUS ONCE
Status: COMPLETED | OUTPATIENT
Start: 2019-02-08 | End: 2019-02-08

## 2019-02-08 RX ORDER — AZITHROMYCIN 250 MG/1
500 TABLET, FILM COATED ORAL DAILY
Status: DISCONTINUED | OUTPATIENT
Start: 2019-02-08 | End: 2019-02-13 | Stop reason: HOSPADM

## 2019-02-08 RX ADMIN — Medication 10 ML: at 13:45

## 2019-02-08 RX ADMIN — INSULIN HUMAN 8 UNITS: 100 INJECTION, SOLUTION PARENTERAL at 18:41

## 2019-02-08 RX ADMIN — ALLOPURINOL 100 MG: 100 TABLET ORAL at 08:29

## 2019-02-08 RX ADMIN — MONTELUKAST SODIUM 10 MG: 10 TABLET, FILM COATED ORAL at 21:52

## 2019-02-08 RX ADMIN — FUROSEMIDE 40 MG: 40 TABLET ORAL at 08:29

## 2019-02-08 RX ADMIN — APIXABAN 2.5 MG: 2.5 TABLET, FILM COATED ORAL at 21:52

## 2019-02-08 RX ADMIN — METHYLPREDNISOLONE SODIUM SUCCINATE 40 MG: 40 INJECTION, POWDER, FOR SOLUTION INTRAMUSCULAR; INTRAVENOUS at 13:45

## 2019-02-08 RX ADMIN — METHYLPREDNISOLONE SODIUM SUCCINATE 40 MG: 40 INJECTION, POWDER, FOR SOLUTION INTRAMUSCULAR; INTRAVENOUS at 06:35

## 2019-02-08 RX ADMIN — APIXABAN 2.5 MG: 2.5 TABLET, FILM COATED ORAL at 08:28

## 2019-02-08 RX ADMIN — FUROSEMIDE 40 MG: 10 INJECTION, SOLUTION INTRAMUSCULAR; INTRAVENOUS at 17:48

## 2019-02-08 RX ADMIN — PIPERACILLIN SODIUM,TAZOBACTAM SODIUM 3.38 G: 3; .375 INJECTION, POWDER, FOR SOLUTION INTRAVENOUS at 00:22

## 2019-02-08 RX ADMIN — Medication 5 ML: at 21:59

## 2019-02-08 RX ADMIN — AZITHROMYCIN 500 MG: 250 TABLET, FILM COATED ORAL at 11:49

## 2019-02-08 RX ADMIN — IPRATROPIUM BROMIDE AND ALBUTEROL SULFATE 3 ML: .5; 3 SOLUTION RESPIRATORY (INHALATION) at 11:19

## 2019-02-08 RX ADMIN — BUDESONIDE 500 MCG: 0.5 INHALANT RESPIRATORY (INHALATION) at 20:06

## 2019-02-08 RX ADMIN — Medication 5 ML: at 06:32

## 2019-02-08 RX ADMIN — INSULIN LISPRO 14 UNITS: 100 INJECTION, SOLUTION INTRAVENOUS; SUBCUTANEOUS at 08:29

## 2019-02-08 RX ADMIN — POTASSIUM CHLORIDE 10 MEQ: 10 TABLET, EXTENDED RELEASE ORAL at 08:29

## 2019-02-08 RX ADMIN — Medication 5 ML: at 21:13

## 2019-02-08 RX ADMIN — VERAPAMIL HYDROCHLORIDE 120 MG: 240 TABLET, FILM COATED, EXTENDED RELEASE ORAL at 23:08

## 2019-02-08 RX ADMIN — FLUTICASONE PROPIONATE 2 SPRAY: 50 SPRAY, METERED NASAL at 08:30

## 2019-02-08 RX ADMIN — ATORVASTATIN CALCIUM 40 MG: 40 TABLET, FILM COATED ORAL at 21:53

## 2019-02-08 RX ADMIN — ASPIRIN 81 MG: 81 TABLET, DELAYED RELEASE ORAL at 08:28

## 2019-02-08 RX ADMIN — INSULIN LISPRO 10 UNITS: 100 INJECTION, SOLUTION INTRAVENOUS; SUBCUTANEOUS at 13:13

## 2019-02-08 RX ADMIN — FERROUS SULFATE TAB 325 MG (65 MG ELEMENTAL FE) 325 MG: 325 (65 FE) TAB at 06:31

## 2019-02-08 RX ADMIN — IPRATROPIUM BROMIDE AND ALBUTEROL SULFATE 3 ML: .5; 3 SOLUTION RESPIRATORY (INHALATION) at 03:26

## 2019-02-08 RX ADMIN — BUDESONIDE 500 MCG: 0.5 INHALANT RESPIRATORY (INHALATION) at 08:40

## 2019-02-08 RX ADMIN — INSULIN GLARGINE 10 UNITS: 100 INJECTION, SOLUTION SUBCUTANEOUS at 11:48

## 2019-02-08 RX ADMIN — IPRATROPIUM BROMIDE AND ALBUTEROL SULFATE 3 ML: .5; 3 SOLUTION RESPIRATORY (INHALATION) at 20:06

## 2019-02-08 RX ADMIN — GUAIFENESIN 600 MG: 600 TABLET, EXTENDED RELEASE ORAL at 08:28

## 2019-02-08 RX ADMIN — INSULIN LISPRO 10 UNITS: 100 INJECTION, SOLUTION INTRAVENOUS; SUBCUTANEOUS at 16:19

## 2019-02-08 RX ADMIN — IPRATROPIUM BROMIDE AND ALBUTEROL SULFATE 3 ML: .5; 3 SOLUTION RESPIRATORY (INHALATION) at 08:43

## 2019-02-08 RX ADMIN — IPRATROPIUM BROMIDE AND ALBUTEROL SULFATE 3 ML: .5; 3 SOLUTION RESPIRATORY (INHALATION) at 23:34

## 2019-02-08 RX ADMIN — AMIODARONE HYDROCHLORIDE 200 MG: 200 TABLET ORAL at 08:28

## 2019-02-08 RX ADMIN — CEFTRIAXONE 1 G: 1 INJECTION, POWDER, FOR SOLUTION INTRAMUSCULAR; INTRAVENOUS at 11:49

## 2019-02-08 RX ADMIN — METHYLPREDNISOLONE SODIUM SUCCINATE 40 MG: 40 INJECTION, POWDER, FOR SOLUTION INTRAMUSCULAR; INTRAVENOUS at 21:55

## 2019-02-08 RX ADMIN — PANTOPRAZOLE SODIUM 40 MG: 40 TABLET, DELAYED RELEASE ORAL at 06:31

## 2019-02-08 RX ADMIN — IPRATROPIUM BROMIDE AND ALBUTEROL SULFATE 3 ML: .5; 3 SOLUTION RESPIRATORY (INHALATION) at 17:33

## 2019-02-08 RX ADMIN — HYDRALAZINE HYDROCHLORIDE 20 MG: 20 INJECTION INTRAMUSCULAR; INTRAVENOUS at 21:05

## 2019-02-08 RX ADMIN — INSULIN LISPRO 8 UNITS: 100 INJECTION, SOLUTION INTRAVENOUS; SUBCUTANEOUS at 21:04

## 2019-02-08 RX ADMIN — GUAIFENESIN 600 MG: 600 TABLET, EXTENDED RELEASE ORAL at 21:53

## 2019-02-08 RX ADMIN — LORATADINE 10 MG: 10 TABLET ORAL at 08:28

## 2019-02-08 NOTE — PROGRESS NOTES
Spoke to MD Mandy Carrington regarding lasix dose. MD states to administer lasix dose after 1 U of Packed RBC infusion.

## 2019-02-08 NOTE — CONSULTS
CONSULT NOTE    Gloria Mcgregor    2019    Date of Admission:  2019    The patient's chart is reviewed and the patient is discussed with the staff. Subjective:     Patient is a 82 y.o.  female seen and evaluated at the request of Dr. Nafisa Moreno for the evaluation of respiratory failure. The patient is obese female with history of WILLIAM ,not complaint with CPAP, COPD, chronic respiratory failure on 2.5L NC,. A fib on Eliquis, CKD stage 5 s/p recent AVF in 2019 presented with few says history of increased SOB, wheezing, cough and fever and was admitted for COPD exacerbation and L arm cellulitis. She is on Rocephin and Zithromax     She is [facility] by [facility]   She feels only little better  On airvo 45 %       Review of Systems  A comprehensive review of systems was negative except for that written in the HPI. Patient Active Problem List   Diagnosis Code    Hydronephrosis N13.30    Incomplete bladder emptying R33.9    Mixed incontinence urge and stress (male)(female) N39.46    Acquired cyst of kidney N28.1    Severe obesity (HCC) E66.01    Stage 4 chronic kidney disease ([facility] N18.4    Acute on chronic respiratory failure with hypoxia ([facility]) J96.21    Cellulitis L03.90    COPD exacerbation ([facility]) J44.1    Anemia D64.9    Pulmonary infiltrate in right lung on chest x-ray R91.8    WILLIAM (obstructive sleep apnea) G47.33    Uncontrolled type 2 diabetes mellitus with hyperglycemia ([facility]) E11.65    Anemia of renal disease D63.1       . Prior to Admission Medications   Prescriptions Last Dose Informant Patient Reported? Taking? HYDROcodone-acetaminophen (NORCO)  mg tablet   Yes No   Sig: Take 1 Tab by mouth every eight (8) hours as needed for Pain. LORazepam (ATIVAN) 1 mg tablet   Yes No   Si.5 mg three (3) times daily as needed for Anxiety. Omeprazole delayed release (PRILOSEC D/R) 20 mg tablet   Yes No   Sig: Take 20 mg by mouth daily. albuterol (PROVENTIL VENTOLIN) 2.5 mg /3 mL (0.083 %) nebulizer solution   Yes No   Si.5 mg by Nebulization route every six (6) hours as needed. albuterol (VENTOLIN HFA) 90 mcg/actuation inhaler   Yes No   Sig: Take 2 Puffs by inhalation every six (6) hours as needed for Wheezing. allopurinol (ZYLOPRIM) 100 mg tablet   Yes No   Sig: Take 100 mg by mouth daily. amiodarone (CORDARONE) 200 mg tablet   Yes No   Sig: Take 200 mg by mouth daily. apixaban (ELIQUIS) 2.5 mg tablet   Yes No   Sig: Take 2.5 mg by mouth two (2) times a day. aspirin delayed-release 81 mg tablet   Yes No   Sig: Take 81 mg by mouth daily. atorvastatin (LIPITOR) 40 mg tablet   Yes No   Sig: Take 40 mg by mouth daily. cetirizine (ZYRTEC) 10 mg tablet   Yes No   Sig: Take 10 mg by mouth daily. cholecalciferol (VITAMIN D3) 1,000 unit cap   Yes No   Sig: Take 1,000 Units by mouth daily. ferrous sulfate 325 mg (65 mg iron) tablet   Yes No   Sig: Take 325 mg by mouth Daily (before breakfast). fluticasone (FLONASE ALLERGY RELIEF) 50 mcg/actuation nasal spray   Yes No   Si Sprays by Both Nostrils route daily. fluticasone-vilanterol (BREO ELLIPTA) 100-25 mcg/dose inhaler   Yes No   Sig: Take 1 Puff by inhalation daily. furosemide (LASIX) 40 mg tablet   Yes No   Sig: Take 40 mg by mouth daily. glimepiride (AMARYL) 1 mg tablet   Yes No   Sig: Take 1 mg by mouth Daily (before breakfast). montelukast (SINGULAIR) 10 mg tablet   Yes No   Sig: Take 10 mg by mouth daily. nitroglycerin (NITROSTAT) 0.4 mg SL tablet   Yes No   Si.4 mg by SubLINGual route every five (5) minutes as needed for Chest Pain. Up to 3 doses. potassium chloride SA (MICRO-K) 10 mEq capsule   Yes No   Sig: Take 10 mEq by mouth daily. traMADol (ULTRAM) 50 mg tablet   Yes No   Sig: Take 50 mg by mouth every eight (8) hours as needed for Pain.   umeclidinium (INCRUSE ELLIPTA) 62.5 mcg/actuation inhaler   Yes No   Sig: Take 1 Puff by inhalation daily. verapamil SR (CALAN-SR) 240 mg CR tablet   Yes No   Sig: Take 120 mg by mouth nightly.  Takes 1/2 tab      Facility-Administered Medications: None       Past Medical History:   Diagnosis Date    Adverse effect of anesthesia     Combative    Anemia     Anxiety     Asthma     Atrial flutter (HCC)     Takes Eliquis    CAD (coronary artery disease) 2013    CABG x4    Cancer (HCC)     Uterine    Chronic kidney disease     Chronic obstructive pulmonary disease (HCC)     wears oxygen 2.5 liters continuous    Chronic pain     Lower back    Constipation     DDD (degenerative disc disease), lumbar     Depression     Fatty liver     GERD (gastroesophageal reflux disease)     Heart abnormality     Heart failure (HCC)     Hypercholesterolemia     Hypertension     Kidney disease     Morbid obesity (HCC)     Nausea & vomiting     Osteoarthritis     PUD (peptic ulcer disease)     Sleep apnea     no CPAP, wears 2 liters oxygen at bedtime    Type 2 diabetes mellitus ([facility])     A1C 7.1 on 9/2018, , Low 80's,    Vitamin B12 deficiency      Past Surgical History:   Procedure Laterality Date    CABG, ARTERY-VEIN, FOUR  2013    HX APPENDECTOMY      HX COLONOSCOPY      HX GYN      ectopic pregnancy    HX HEART CATHETERIZATION  2013    HX HYSTERECTOMY      HX OTHER SURGICAL  1992    neck spurs    HX TONSILLECTOMY      NEUROLOGICAL PROCEDURE UNLISTED      diskectomy    VASCULAR SURGERY PROCEDURE UNLIST Right     right carotidendarectomy     Social History     Socioeconomic History    Marital status:      Spouse name: Not on file    Number of children: Not on file    Years of education: Not on file    Highest education level: Not on file   Social Needs    Financial resource strain: Not on file    Food insecurity - worry: Not on file    Food insecurity - inability: Not on file   Slyce needs - medical: Not on file   Slyce needs - non-medical: Not on file Occupational History    Not on file   Tobacco Use    Smoking status: Former Smoker     Packs/day: 1.00     Years: 40.00     Pack years: 40.00     Last attempt to quit: 2012     Years since quittin.1    Smokeless tobacco: Never Used   Substance and Sexual Activity    Alcohol use: No    Drug use: No    Sexual activity: No   Other Topics Concern    Not on file   Social History Narrative    Not on file     Family History   Problem Relation Age of Onset    Cancer Mother         breast    Diabetes Mother     Heart Disease Mother     Diabetes Father     Heart Disease Father      Allergies   Allergen Reactions    Alprazolam Drowsiness    Demerol [Meperidine] Unknown (comments)    Levaquin [Levofloxacin] Rash    Oxycodone Unknown (comments) and Nausea and Vomiting    Pepcid [Famotidine] Nausea and Vomiting    Phenergan [Promethazine] Unknown (comments) and Other (comments)     Turns red and swells    Sitagliptin Unknown (comments)    Sulfa (Sulfonamide Antibiotics) Nausea and Vomiting       Current Facility-Administered Medications   Medication Dose Route Frequency    insulin glargine (LANTUS) injection 10 Units  10 Units SubCUTAneous DAILY    cefTRIAXone (ROCEPHIN) 1 g in 0.9% sodium chloride (MBP/ADV) 50 mL  1 g IntraVENous Q24H    azithromycin (ZITHROMAX) tablet 500 mg  500 mg Oral DAILY    furosemide (LASIX) injection 40 mg  40 mg IntraVENous ONCE    0.9% sodium chloride infusion 250 mL  250 mL IntraVENous PRN    albuterol-ipratropium (DUO-NEB) 2.5 MG-0.5 MG/3 ML  3 mL Nebulization Q4H RT    methylPREDNISolone (PF) (SOLU-MEDROL) injection 40 mg  40 mg IntraVENous Q8H    allopurinol (ZYLOPRIM) tablet 100 mg  100 mg Oral DAILY    amiodarone (CORDARONE) tablet 200 mg  200 mg Oral DAILY    apixaban (ELIQUIS) tablet 2.5 mg  2.5 mg Oral Q12H    aspirin delayed-release tablet 81 mg  81 mg Oral DAILY    loratadine (CLARITIN) tablet 10 mg  10 mg Oral DAILY    ferrous sulfate tablet 325 mg 325 mg Oral ACB    fluticasone (FLONASE) 50 mcg/actuation nasal spray 2 Spray  2 Spray Both Nostrils DAILY    furosemide (LASIX) tablet 40 mg  40 mg Oral DAILY    budesonide (PULMICORT) 500 mcg/2 ml nebulizer suspension  500 mcg Nebulization BID RT    insulin lispro (HUMALOG) injection   SubCUTAneous AC&HS    LORazepam (ATIVAN) tablet 0.5 mg  0.5 mg Oral TID PRN    montelukast (SINGULAIR) tablet 10 mg  10 mg Oral QHS    pantoprazole (PROTONIX) tablet 40 mg  40 mg Oral ACB    potassium chloride (KLOR-CON) tablet 10 mEq  10 mEq Oral DAILY    traMADol (ULTRAM) tablet 50 mg  50 mg Oral Q8H PRN    verapamil ER (CALAN-SR) tablet 120 mg  120 mg Oral QHS    guaiFENesin ER (MUCINEX) tablet 600 mg  600 mg Oral Q12H    sodium chloride (NS) flush 5-40 mL  5-40 mL IntraVENous Q8H    sodium chloride (NS) flush 5-40 mL  5-40 mL IntraVENous PRN    atorvastatin (LIPITOR) tablet 40 mg  40 mg Oral QHS         Objective:     Vitals:    02/08/19 0847 02/08/19 1121 02/08/19 1132 02/08/19 1230   BP:    167/76   Pulse:    82   Resp:    22   Temp:    98 °F (36.7 °C)   SpO2: 96% 96% 96% 93%   Weight:       Height:           PHYSICAL EXAM     Constitutional:  the patient is well developed and in no acute distress  EENMT:  Sclera clear, pupils equal, oral mucosa moist  Respiratory: crackles  Cardiovascular:  RRR without M,G,R  Gastrointestinal: soft and non-tender; with positive bowel sounds. Musculoskeletal: warm without cyanosis. There is plus 1 lower leg edema.   Skin:  no jaundice or rashes, no wounds   Neurologic: no gross neuro deficits     Psychiatric:  alert and oriented x 3    CXR:        Recent Labs     02/08/19  0540 02/07/19  0556 02/06/19  1019   WBC 12.7* 11.0 10.5   HGB 7.2* 7.4* 7.6*   HCT 23.1* 24.0* 24.8*    266 270     Recent Labs     02/08/19  0540 02/07/19  0556 02/06/19  1019    140 142   K 4.8 4.4 4.2    103 103   * 236* 159*   CO2 26 26 27   BUN 82* 66* 54*   CREA 3.84* 3.30* 3.09*   PHOS 5.3*  --   --    CA 8.3 8.3 8.3   TROIQ  --   --  <0.02*   ALB 2.8*  --  3.0*   TBILI 0.3  --  0.3   ALT 98*  --  20   SGOT 92*  --  19         Assessment:  (Medical Decision Making)     Hospital Problems  Date Reviewed: 1/31/2019          Codes Class Noted POA    Uncontrolled type 2 diabetes mellitus with hyperglycemia ([facility] ICD-10-CM: E11.65  ICD-9-CM: 250.02  2/8/2019 Yes        Anemia of renal disease ICD-10-CM: D63.1  ICD-9-CM: 285.21  2/8/2019 Yes        WILLIAM (obstructive sleep apnea) ICD-10-CM: G47.33  ICD-9-CM: 327.23  2/7/2019 Yes    Overview Signed 2/7/2019 10:49 AM by Janusz Lucas MD     Not using CPAP  See Dr. Raquel Carranza at [facility]             * (Principal) Acute on chronic respiratory failure with hypoxia ([facility] on chronic 02   Now on optiflow 40 %  ICD-10-CM: J96.21  ICD-9-CM: 518.84, 799.02  2/6/2019 Yes        Cellulitis ICD-10-CM: L03.90  ICD-9-CM: 682.9  2/6/2019 Yes        COPD exacerbation ([facility] ICD-10-CM: J44.1  ICD-9-CM: 491.21  2/6/2019 Yes        Pulmonary infiltrate in right lung on chest x-ray   on ABX for CAP ICD-10-CM: R91.8  ICD-9-CM: 793.19  2/6/2019 Yes        Severe obesity ([facility] ICD-10-CM: E66.01  ICD-9-CM: 278.01  1/24/2019 Yes        Stage 4 chronic kidney disease ([facility] ICD-10-CM: N18.4  ICD-9-CM: 585.4  1/24/2019 Yes              Plan:  (Medical Decision Making)     -- agree with ABX, steroids, nebs  -wean airvo as tolerated  Will add flutter valve     More than 50% of the time documented was spent in face-to-face contact with the patient and in the care of the patient on the floor/unit where the patient is located. Thank you very much for this referral.  We appreciate the opportunity to participate in this patient's care. Will follow along with above stated plan.     Buzz Xiao MD

## 2019-02-08 NOTE — PROGRESS NOTES
Hospitalist Progress Note Subjective:  
Daily Progress Note: 2/8/2019 12:38 PM 
 
Ms. Yulisa Trimble is an 81 yo obese white female with a pmh of WILLIAM (not on cpap), CHF, Afib on eliquis and amiodarone, CKD stage 5 (baseline Cr 3-4) s/p recent AVF placement on 1/31, DM2 who presented 2/6 with report of increasing wheeezing, cough and fever/chills for last 3-4 days. She has COPD and usually wears 2.5 L during day ( see Dr. Guillermo Pacheco at [facility] ) she was found hypoxic at 85% in ED and was admitted for COPD flare up and possible Left arm cellulitis as there was tenderness and swelling. Placed on zosyn and vancomycin. LUE AVG evaluated by vascular surgery and graft is NOT infected and with thrill. She wears 2.5 liters O2 at baseline and is currently appearing labored with increased work of breathing on 50 liters AirVo. Hg down to 7.2, has anemia of renal disease. Denies chest pain. Current Facility-Administered Medications Medication Dose Route Frequency  insulin glargine (LANTUS) injection 10 Units  10 Units SubCUTAneous DAILY  cefTRIAXone (ROCEPHIN) 1 g in 0.9% sodium chloride (MBP/ADV) 50 mL  1 g IntraVENous Q24H  
 azithromycin (ZITHROMAX) tablet 500 mg  500 mg Oral DAILY  furosemide (LASIX) injection 40 mg  40 mg IntraVENous ONCE  
 0.9% sodium chloride infusion 250 mL  250 mL IntraVENous PRN  
 albuterol-ipratropium (DUO-NEB) 2.5 MG-0.5 MG/3 ML  3 mL Nebulization Q4H RT  
 methylPREDNISolone (PF) (SOLU-MEDROL) injection 40 mg  40 mg IntraVENous Q8H  
 allopurinol (ZYLOPRIM) tablet 100 mg  100 mg Oral DAILY  amiodarone (CORDARONE) tablet 200 mg  200 mg Oral DAILY  apixaban (ELIQUIS) tablet 2.5 mg  2.5 mg Oral Q12H  aspirin delayed-release tablet 81 mg  81 mg Oral DAILY  loratadine (CLARITIN) tablet 10 mg  10 mg Oral DAILY  ferrous sulfate tablet 325 mg  325 mg Oral ACB  fluticasone (FLONASE) 50 mcg/actuation nasal spray 2 Spray  2 Spray Both Nostrils DAILY  furosemide (LASIX) tablet 40 mg  40 mg Oral DAILY  budesonide (PULMICORT) 500 mcg/2 ml nebulizer suspension  500 mcg Nebulization BID RT  
 insulin lispro (HUMALOG) injection   SubCUTAneous AC&HS  
 LORazepam (ATIVAN) tablet 0.5 mg  0.5 mg Oral TID PRN  
 montelukast (SINGULAIR) tablet 10 mg  10 mg Oral QHS  pantoprazole (PROTONIX) tablet 40 mg  40 mg Oral ACB  potassium chloride (KLOR-CON) tablet 10 mEq  10 mEq Oral DAILY  traMADol (ULTRAM) tablet 50 mg  50 mg Oral Q8H PRN  
 verapamil ER (CALAN-SR) tablet 120 mg  120 mg Oral QHS  guaiFENesin ER (MUCINEX) tablet 600 mg  600 mg Oral Q12H  
 sodium chloride (NS) flush 5-40 mL  5-40 mL IntraVENous Q8H  
 sodium chloride (NS) flush 5-40 mL  5-40 mL IntraVENous PRN  
 atorvastatin (LIPITOR) tablet 40 mg  40 mg Oral QHS Review of Systems A comprehensive review of systems was negative except for that written in the HPI. Objective:  
 
Visit Vitals /76 (BP 1 Location: Right arm, BP Patient Position: Sitting) Pulse 82 Temp 98 °F (36.7 °C) Resp 22 Ht 5' 2\" (1.575 m) Wt 106.6 kg (235 lb) SpO2 93% BMI 42.98 kg/m² O2 Flow Rate (L/min): 45 l/min O2 Device: Heated, Hi flow nasal cannula, Humidifier Temp (24hrs), Av.4 °F (36.3 °C), Min:96.6 °F (35.9 °C), Max:98 °F (36.7 °C) No intake/output data recorded.  1901 -  0700 In: -  
Out: 1900 [[id number]] General: obese, ill appearing, mildly distressed, awake/alert/oriented Eyes; non icteric, EOMI Neck; supple CV; RRR Pulm: course, diminished on right, expiratory wheezing present, labored Abd; soft, non tender, active BS Skin/ext: left ABG with thrill, no erythema but mild swelling to site Additional comments:I reviewed the patient's new clinical lab test results. blood sugars, labs, chest xray Data Review Recent Results (from the past 24 hour(s)) [place] Console Collection Time: 19  4:30 PM  
Result Value Ref Range Vancomycin, random 24.5 UG/ML  
GLUCOSE, POC Collection Time: 02/07/19  4:49 PM  
Result Value Ref Range Glucose (POC) 263 (H) 65 - 100 mg/dL GLUCOSE, POC Collection Time: 02/07/19  9:46 PM  
Result Value Ref Range Glucose (POC) 264 (H) 65 - 100 mg/dL CBC WITH AUTOMATED DIFF Collection Time: 02/08/19  5:40 AM  
Result Value Ref Range WBC 12.7 (H) 4.3 - 11.1 K/uL  
 RBC 2.48 (L) 4.05 - 5.2 M/uL HGB 7.2 (L) 11.7 - 15.4 g/dL HCT 23.1 (L) 35.8 - 46.3 % MCV 93.1 79.6 - 97.8 FL  
 MCH 29.0 26.1 - 32.9 PG  
 MCHC 31.2 (L) 31.4 - 35.0 g/dL  
 RDW 16.3 (H) 11.9 - 14.6 % PLATELET 445 188 - 543 K/uL MPV 11.5 9.4 - 12.3 FL ABSOLUTE NRBC 0.03 0.0 - 0.2 K/uL  
 DF AUTOMATED NEUTROPHILS 92 (H) 43 - 78 % LYMPHOCYTES 5 (L) 13 - 44 % MONOCYTES 2 (L) 4.0 - 12.0 % EOSINOPHILS 0 (L) 0.5 - 7.8 % BASOPHILS 0 0.0 - 2.0 % IMMATURE GRANULOCYTES 1 0.0 - 5.0 %  
 ABS. NEUTROPHILS 11.7 (H) 1.7 - 8.2 K/UL  
 ABS. LYMPHOCYTES 0.7 0.5 - 4.6 K/UL  
 ABS. MONOCYTES 0.3 0.1 - 1.3 K/UL  
 ABS. EOSINOPHILS 0.0 0.0 - 0.8 K/UL  
 ABS. BASOPHILS 0.0 0.0 - 0.2 K/UL  
 ABS. IMM. GRANS. 0.1 0.0 - 0.5 K/UL METABOLIC PANEL, COMPREHENSIVE Collection Time: 02/08/19  5:40 AM  
Result Value Ref Range Sodium 139 136 - 145 mmol/L Potassium 4.8 3.5 - 5.1 mmol/L Chloride 102 98 - 107 mmol/L  
 CO2 26 21 - 32 mmol/L Anion gap 11 mmol/L Glucose 316 (H) 65 - 100 mg/dL BUN 82 (H) 8 - 23 MG/DL Creatinine 3.84 (H) 0.6 - 1.0 MG/DL  
 GFR est AA 14 (L) >60 ml/min/1.73m2 GFR est non-AA 12 ml/min/1.73m2 Calcium 8.3 8.3 - 10.4 MG/DL Bilirubin, total 0.3 0.2 - 1.1 MG/DL  
 ALT (SGPT) 98 (H) 12 - 65 U/L  
 AST (SGOT) 92 (H) 15 - 37 U/L Alk. phosphatase 126 50 - 130 U/L Protein, total 6.5 g/dL Albumin 2.8 (L) 3.2 - 4.6 g/dL Globulin 3.7 (H) 2.3 - 3.5 g/dL A-G Ratio 0.8 Magen Briones Collection Time: 02/08/19  5:40 AM  
Result Value Ref Range Vancomycin, random 20.3 UG/ML  
PHOSPHORUS Collection Time: 02/08/19  5:40 AM  
Result Value Ref Range Phosphorus 5.3 (H) 2.3 - 3.7 MG/DL  
GLUCOSE, POC Collection Time: 02/08/19  7:29 AM  
Result Value Ref Range Glucose (POC) 363 (H) 65 - 100 mg/dL GLUCOSE, POC Collection Time: 02/08/19 11:44 AM  
Result Value Ref Range Glucose (POC) 483 (HH) 65 - 100 mg/dL Assessment/Plan:  
 
Principal Problem: 
  Acute on chronic respiratory failure with hypoxia ([address] (2/6/2019) Multifactorial.  Continue solumedrol q8 and duonebs q4 with bid mucinex. Will change zosyn and vancomycin to zithromax and rocephin for CAP coverage as AVG not infected. Will transfuse one unit PRBCs for oxygen carrying capacity as she is quite symptomatic. Will give additional lasix 40 mg iv x one after infusion. Continue daily lasix. Active Problems: 
  Severe obesity ([facility] (1/24/2019) Stage 4 chronic kidney disease ([address] (1/24/2019) SCr stable at this time. COPD exacerbation ([address] (2/6/2019) As above Anemia (2/6/2019) Pulmonary infiltrate in right lung on chest x-ray (2/6/2019) WILLIAM (obstructive sleep apnea) (2/7/2019) Overview: Not using CPAP See Dr. Shan Vieira at [facility] Uncontrolled type 2 diabetes mellitus with hyperglycemia ([facility] (2/8/2019) On SSI. Oral agents held. BS's not controlled as exacerbated by steroids. Add lantus 10 units subq daily Anemia of renal disease (2/8/2019) Transfuse one unit PRBCs. May need additional units. Patient is high risk for decompensation due to precarious respiratory status Care Plan discussed with: Patient/Family and Nurse Signed By: Ulices Aguirre MD   
 February 8, 2019

## 2019-02-08 NOTE — PROGRESS NOTES
Problem: Falls - Risk of 
Goal: *Absence of Falls Document Orvel Buffy Fall Risk and appropriate interventions in the flowsheet. Outcome: Progressing Towards Goal 
Fall Risk Interventions: 
Mobility Interventions: Bed/chair exit alarm Medication Interventions: Bed/chair exit alarm Elimination Interventions: Call light in reach

## 2019-02-08 NOTE — PROGRESS NOTES
Problem: Self Care Deficits Care Plan (Adult) Goal: *Acute Goals and Plan of Care (Insert Text) 1. Patient will perform grooming with SBA. 2. Patient will perform Upper body bathing with min assist. 
3 Patient will perform upper and lower body bathing with supervision. 5. Patient will perform toilet transfers with CGA/SBA. 6. Patient will perform shower transfer with CGA/SBA. 7. Patient will participate in 30 + minutes of ADL/ therapeutic exercise/therapeutic activity with mod rest breaks to increase activity tolerance for self care. 8. Patient will perform ADL functional mobility in room with CGA/SBA. Goals to be achieved in 7 days. OCCUPATIONAL THERAPY: Initial Assessment and PM 2/8/2019OBSERVATION:   
Payor: Jluis Ovalle / Plan: [address] [place] [address] HMO / Product Type: Managed Care Medicare /  
 NO BP  R UE 
NAME/AGE/GENDER: Yessica Nash is a 82 y.o. female PRIMARY DIAGNOSIS:  Acute on chronic respiratory failure with hypoxia ([facility] [J96.21] Acute on chronic respiratory failure with hypoxia (HCC) [J96.21] Acute on chronic respiratory failure with hypoxia (HCC) Acute on chronic respiratory failure with hypoxia (HCC) ICD-10: Treatment Diagnosis:  
 · Generalized Muscle Weakness (M62.81) · Other lack of cordination (R27.8) Precautions/Allergies: 
   Alprazolam; Demerol [meperidine]; Levaquin [levofloxacin]; Oxycodone; Pepcid [famotidine]; Phenergan [promethazine]; Sitagliptin; and Sulfa (sulfonamide antibiotics) ASSESSMENT:  
Ms. Daphne Jones presents siting up in the recliner, wanting to use the [facility]. She is on Airvo and presents with the above diagnosis. She has limited R shoulder AROM due to old fracture. She has a new AV fistula in her L forearm edema present. SHe had assistance from family prior to admit with dressing and bathing. Patient transferred with CGA and performed front hygiene and clothing management with CGA.  She needed assistance with rear hygiene. SHe transferred back to [facility] and washed her hands and set up her own lunch tray. She plans to go back to her home with family support. She would benefit from skilled OT services. Will follow. This section established at most recent assessment PROBLEM LIST (Impairments causing functional limitations): 1. Decreased Strength 2. Decreased ADL/Functional Activities 3. Decreased Transfer Abilities 4. Decreased Ambulation Ability/Technique 5. Decreased Balance 6. Decreased Activity Tolerance 7. Decreased Pacing Skills 8. Decreased Work Simplification/Energy Conservation Techniques 9. Increased Shortness of Breath 10. Decreased Flexibility/Joint Mobility INTERVENTIONS PLANNED: (Benefits and precautions of occupational therapy have been discussed with the patient.) 1. Activities of daily living training 2. Adaptive equipment training 3. Balance training 4. Clothing management 5. Donning&doffing training 6. Neuromuscular re-eduation 7. Therapeutic activity 8. Therapeutic exercise TREATMENT PLAN: Frequency/Duration: Follow patient 3 times to address above goals. Rehabilitation Potential For Stated Goals: Good RECOMMENDED REHABILITATION/EQUIPMENT: (at time of discharge pending progress): Due to the probability of continued deficits (see above) this patient will likely need continued skilled occupational therapy after discharge. Equipment:  
? None at this time OCCUPATIONAL PROFILE AND HISTORY:  
History of Present Injury/Illness (Reason for Referral): 
See H and P Past Medical History/Comorbidities: Ms. Yaneth Mujica  has a past medical history of Adverse effect of anesthesia, Anemia, Anxiety, Asthma, Atrial flutter ([address] CAD (coronary artery disease) (2013), Cancer ([address] Chronic kidney disease, Chronic obstructive pulmonary disease ([address] Chronic pain, Constipation, DDD (degenerative disc disease), lumbar, Depression, Fatty liver, GERD (gastroesophageal reflux disease), Heart abnormality, Heart failure ([address] Hypercholesterolemia, Hypertension, Kidney disease, Morbid obesity ([address] Nausea & vomiting, Osteoarthritis, PUD (peptic ulcer disease), Sleep apnea, Type 2 diabetes mellitus ([address] and Vitamin B12 deficiency. Ms. Yulisa Trimble  has a past surgical history that includes hx heart catheterization (2013); pr cabg, artery-vein, four (2013); vascular surgery procedure unlist (Right); hx hysterectomy; hx gyn; hx other surgical (1992); pr neurological procedure unlisted; hx appendectomy; hx tonsillectomy; and hx colonoscopy. Social History/Living Environment:  
Home Environment: Private residence Wheelchair Ramp: Yes One/Two Story Residence: Two story(basement - lives on main level and doesn't use basement) Living Alone: No 
Support Systems: Family member(s), Spouse/Significant Other/Partner Patient Expects to be Discharged to[de-identified] Private residence Current DME Used/Available at Home: Cane, straight, Commode, bedside, Glucometer, Nebulizer, Walker, rollator, Wheelchair Tub or Shower Type: Other (comment)(walk-in tub/shower with built in seat) Prior Level of Function/Work/Activity: 
Assist with ADls from family, mod I with rollator Number of Personal Factors/Comorbidities that affect the Plan of Care: Brief history (0):  LOW COMPLEXITY ASSESSMENT OF OCCUPATIONAL PERFORMANCE[de-identified]  
Activities of Daily Living:  
Basic ADLs (From Assessment) Complex ADLs (From Assessment) Feeding: Supervision(set up her own tray) Oral Facial Hygiene/Grooming: Supervision(washed her hands) Bathing: Moderate assistance Upper Body Dressing: Moderate assistance Lower Body Dressing: Moderate assistance, Maximum assistance Toileting: Minimum assistance(CGA manage clothing standing, did front hygien) Grooming/Bathing/Dressing Activities of Daily Living Cognitive Retraining Safety/Judgement: Awareness of environment; Fall prevention Toileting Toileting Assistance: Minimum assistance; Moderate assistance Bladder Hygiene: Contact guard assistance Bowel Hygiene: Minimum assistance; Moderate assistance Clothing Management: Contact guard assistance Adaptive Equipment: Walker(BSC) Functional Transfers Toilet Transfer : Contact guard assistance(onto BSC and back to recliner) Bed/Mat Mobility Supine to Sit: Minimum assistance(with HOB 50 deg & rail) Sit to Supine: (NT) Sit to Stand: Contact guard assistance Bed to Chair: Contact guard assistance(with walker) Most Recent Physical Functioning:  
Gross Assessment: 
  
         
  
Posture: 
Posture (WDL): Exceptions to [facility] Posture Assessment: Forward head, Rounded shoulders Balance: 
Sitting: Intact; Without support Standing: With support Bed Mobility: 
Supine to Sit: Minimum assistance(with HOB 50 deg & rail) Sit to Supine: (NT) Wheelchair Mobility: 
  
Transfers: 
Sit to Stand: Contact guard assistance Stand to Sit: Contact guard assistance Bed to Chair: Contact guard assistance(with walker) Duration: 23 Minutes(extra time to work through activity noted) Patient Vitals for the past 6 hrs: 
 BP BP Patient Position SpO2 O2 Flow Rate (L/min) Pulse 02/08/19 1121   96 % 50 l/min   
02/08/19 1132   96 % 45 l/min   
02/08/19 1230 167/76 Sitting 93 %  82  
02/08/19 1437 123/57  96 %  90 Mental Status Neurologic State: Alert, Appropriate for age Orientation Level: Appropriate for age Cognition: Appropriate decision making, Appropriate for age attention/concentration, Appropriate safety awareness, Follows commands Perception: Appears intact Perseveration: No perseveration noted Safety/Judgement: Awareness of environment, Fall prevention LLE Assessment LLE Assessment (WDL): Exception to [facility] RLE Assessment RLE Assessment (WDL): Exceptions to [facility] Physical Skills Involved: 1. Range of Motion 2. Balance 3. Strength 4. Activity Tolerance Cognitive Skills Affected (resulting in the inability to perform in a timely and safe manner): 1. none Psychosocial Skills Affected: 1. none Number of elements that affect the Plan of Care: 3-5:  MODERATE COMPLEXITY CLINICAL DECISION MAKIN[address] [place] [address] AM-PAC 6 Clicks Daily Activity Inpatient Short Form How much help from another person does the patient currently need. .. Total A Lot A Little None 1. Putting on and taking off regular lower body clothing? [] 1   [x] 2   [] 3   [] 4  
2. Bathing (including washing, rinsing, drying)? [] 1   [x] 2   [] 3   [] 4  
3. Toileting, which includes using toilet, bedpan or urinal?   [] 1   [] 2   [x] 3   [] 4  
4. Putting on and taking off regular upper body clothing? [] 1   [] 2   [x] 3   [] 4  
5. Taking care of personal grooming such as brushing teeth? [] 1   [] 2   [x] 3   [] 4  
6. Eating meals? [] 1   [] 2   [] 3   [x] 4  
© , Trustees of [address] [place] [address], under license to [employer]. All rights reserved Score:  Initial: 17 Most Recent: X (Date: -- ) Interpretation of Tool:  Represents activities that are increasingly more difficult (i.e. Bed mobility, Transfers, Gait). Medical Necessity:    
· Patient is expected to demonstrate progress in balance, coordination and functional technique to decrease assistance required with self care and functional mobiltiy and improve safety during self care and functional mobiltiy. Reason for Services/Other Comments: 
· Patient continues to require skilled intervention due to decreased self care and functional mobiltiy. Use of outcome tool(s) and clinical judgement create a POC that gives a: LOW COMPLEXITY  
 
 
 
TREATMENT:  
(In addition to Assessment/Re-Assessment sessions the following treatments were rendered) Pre-treatment Symptoms/Complaints:   
Pain: Initial:  
Pain Intensity 1: 0  Post Session:  0/10 Assessment/Reassessment only, no treatment provided today Braces/Orthotics/Lines/Etc:  
· O2 Device: Heated, Hi flow nasal cannula, Humidifier Treatment/Session Assessment:   
· Response to Treatment:   
· Interdisciplinary Collaboration:  
o Physical Therapist 
o Occupational Therapist 
o Registered Nurse 
o Certified Nursing Assistant/Patient Care Technician · After treatment position/precautions:  
o Up in chair 
o Bed/Chair-wheels locked 
o Call light within reach 
o RN notified · Compliance with Program/Exercises: Compliant all of the time. · Recommendations/Intent for next treatment session: \"Next visit will focus on advancements to more challenging activities and reduction in assistance provided\". Total Treatment Duration: OT Patient Time In/Time Out Time In: 9994 Time Out: 1406 Amparo Ventura OT

## 2019-02-08 NOTE — PROGRESS NOTES
Received report from [address] . Pt resting in bed. A&Ox4. Respirations present, even, unlabored. Lung sounds coarse, wheezing on auscultation. HR regular, S1&S2 auscultated. Tele box in place. IV capped and patent. Bed in lowest position, call light within reach, side rails x3. Will continue to monitor throughout the shift.

## 2019-02-08 NOTE — PROGRESS NOTES
Problem: Mobility Impaired (Adult and Pediatric) Goal: *Acute Goals and Plan of Care (Insert Text) DISCHARGE GOALS: 
(1.)Ms. Bebeto Doherty will move from supine to sit and sit to supine  in bed with SUPERVISION within 5 treatment day(s). (2.)Ms. Bebeto Doherty will transfer from bed to chair and chair to bed with SUPERVISION using the least restrictive device within 5 treatment day(s). (3.)Ms. Bebeto Doherty will ambulate with SUPERVISION for 25 feet with the least restrictive device within 5 treatment day(s). ________________________________________________________________________________________________ PHYSICAL THERAPY: Daily Note and AM 2/8/2019OBSERVATION: PT Visit Days : 1 Payor: Alejandro Marx / Plan: [address] [place] [address] HMO / Product Type: [employer] Medicare /   
  
NAME/AGE/GENDER: Nii Dolan is a 82 y.o. female PRIMARY DIAGNOSIS: Acute on chronic respiratory failure with hypoxia ([facility] [address] [J96.21] Acute on chronic respiratory failure with hypoxia (HCC) [J96.21] Acute on chronic respiratory failure with hypoxia (HCC) Acute on chronic respiratory failure with hypoxia (HCC) ICD-10: Treatment Diagnosis:  
 · Generalized Muscle Weakness (M62.81) · Difficulty in walking, Not elsewhere classified (R26.2) Precaution/Allergies: 
Alprazolam; Demerol [meperidine]; Levaquin [levofloxacin]; Oxycodone; Pepcid [famotidine]; Phenergan [promethazine]; Sitagliptin; and Sulfa (sulfonamide antibiotics) ASSESSMENT:  
Ms. Bebeto Doherty was easily SOB with minimal exertion while on airvo. PT worked on repeated sit<>Stand & stand pivot transfer using a walker. Also had pt practice wt shifting while seated EOB & while standing with walker. This pt is non-functional currently due to MARQUEZ. Pt has a good DC plan to home with caregivers. This section established at most recent assessment PROBLEM LIST (Impairments causing functional limitations): 1. Decreased Strength 2. Decreased ADL/Functional Activities 3. Decreased Transfer Abilities 4. Decreased Ambulation Ability/Technique 5. Decreased Balance 6. Decreased Activity Tolerance 7. Increased Shortness of Breath 8. Edema/Girth INTERVENTIONS PLANNED: (Benefits and precautions of physical therapy have been discussed with the patient.) 1. Balance Exercise 2. Bed Mobility 3. Family Education 4. Gait Training 5. Therapeutic Activites 6. Therapeutic Exercise/Strengthening TREATMENT PLAN: Frequency/Duration: daily for duration of hospital stay Rehabilitation Potential For Stated Goals: Good RECOMMENDED REHABILITATION/EQUIPMENT: (at time of discharge pending progress): Due to the probability of continued deficits (see above) this patient will likely need continued skilled physical therapy after discharge. Equipment:  
? None at this time HISTORY:  
History of Present Injury/Illness (Reason for Referral): 
Pt 83F with pmhx of CABG x 4, WILLIAM does not use CPAP, CHF, Afib on eliquis and amiodarone, CKD stage 5 (baseline Cr 3-4) s/p recent AVF placement on 1/31, DM2 presents with report of increasing wheeezing, cough and fever/chills for last 3-4 days. She has COPD and usually wears 2.5 L during day. Was found hypoxic at 85% on this amount. Now requiring 4lnc. Also noted to have red/warm LUE s/p recent AV graft placement CXR report RLL infiltrate. WBC 10K, hgb 7.6 (usually hgb around 8.5) 
  Hospitalist asked to admit for COPDe and LUE cellulitis. Past Medical History/Comorbidities: Ms. Delfin Goncalves  has a past medical history of Adverse effect of anesthesia, Anemia, Anxiety, Asthma, Atrial flutter ([address] CAD (coronary artery disease) (2013), Cancer ([address] Chronic kidney disease, Chronic obstructive pulmonary disease ([address] Chronic pain, Constipation, DDD (degenerative disc disease), lumbar, Depression, Fatty liver, GERD (gastroesophageal reflux disease), Heart abnormality, Heart failure ([address] Hypercholesterolemia, Hypertension, Kidney disease, Morbid obesity ([address] Nausea & vomiting, Osteoarthritis, PUD (peptic ulcer disease), Sleep apnea, Type 2 diabetes mellitus ([address] and Vitamin B12 deficiency. Ms. Daphne Jones  has a past surgical history that includes hx heart catheterization (2013); pr cabg, artery-vein, four (2013); vascular surgery procedure unlist (Right); hx hysterectomy; hx gyn; hx other surgical (1992); pr neurological procedure unlisted; hx appendectomy; hx tonsillectomy; and hx colonoscopy. Social History/Living Environment:  
Home Environment: Private residence Wheelchair Ramp: Yes One/Two Story Residence: Two story(basement - lives on main level and doesn't use basement) Living Alone: No 
Support Systems: Family member(s), Spouse/Significant Other/Partner Patient Expects to be Discharged to[de-identified] Private residence Current DME Used/Available at Home: Cane, straight, Commode, bedside, Glucometer, Nebulizer, Walker, rollator, Wheelchair Tub or Shower Type: Other (comment)(walk-in tub/shower with built in seat) Prior Level of Function/Work/Activity: 
Limited ambulation-using rollator or wheelchair. Number of Personal Factors/Comorbidities that affect the Plan of Care: 1-2: MODERATE COMPLEXITY EXAMINATION:  
Most Recent Physical Functioning:  
Gross Assessment: 3-/5 throughout Balance: 
Sitting: Intact; Without support Standing: Impaired; With support Bed Mobility: 
Supine to Sit: Minimum assistance(with HOB 50 deg & rail) Sit to Supine: (NT) Transfers: 
Sit to Stand: Contact guard assistance Stand to Sit: Contact guard assistance Bed to Chair: Contact guard assistance(with walker) Duration: 23 Minutes(extra time to work through activity noted) Gait: 
  
Speed/Kari: Shuffled; Slow Step Length: Left shortened;Right shortened Gait Abnormalities: Decreased step clearance Distance (ft): 4 Feet (ft) Assistive Device: Walker, rolling Ambulation - Level of Assistance: Contact guard assistance Body Structures Involved: 1. Muscles Body Functions Affected: 1. Movement Related Activities and Participation Affected: 1. Mobility 2. Self Care Number of elements that affect the Plan of Care: 4+: HIGH COMPLEXITY CLINICAL PRESENTATION:  
Presentation: Stable and uncomplicated: LOW COMPLEXITY CLINICAL DECISION MAKIN[address] [place] [address] AM-PAC 6 Clicks Basic Mobility Inpatient Short Form How much difficulty does the patient currently have. .. Unable A Lot A Little None 1. Turning over in bed (including adjusting bedclothes, sheets and blankets)? [] 1   [] 2   [x] 3   [] 4  
2. Sitting down on and standing up from a chair with arms ( e.g., wheelchair, bedside commode, etc.)   [] 1   [] 2   [x] 3   [] 4  
3. Moving from lying on back to sitting on the side of the bed? [] 1   [] 2   [x] 3   [] 4 How much help from another person does the patient currently need. .. Total A Lot A Little None 4. Moving to and from a bed to a chair (including a wheelchair)? [] 1   [] 2   [x] 3   [] 4  
5. Need to walk in hospital room? [] 1   [] 2   [x] 3   [] 4  
6. Climbing 3-5 steps with a railing? [] 1   [x] 2   [] 3   [] 4  
© , Trustees of [address] [place] [address], under license to [employer]. All rights reserved Score:  Initial: 17 Most Recent: X (Date: -- ) Interpretation of Tool:  Represents activities that are increasingly more difficult (i.e. Bed mobility, Transfers, Gait). Medical Necessity:    
· Patient is expected to demonstrate progress in strength, balance and coordination to increase independence with mobility and improve tolerance for activity. Reason for Services/Other Comments: 
· Patient continues to require skilled intervention due to decreased strength and mobility. Use of outcome tool(s) and clinical judgement create a POC that gives a: Clear prediction of patient's progress: LOW COMPLEXITY TREATMENT:  
(In addition to Assessment/Re-Assessment sessions the following treatments were rendered) Pre-treatment Symptoms/Complaints: very weak & easily SOB Pain: Initial: visual scale Pain Intensity 1: 0  Post Session:  0/10 Therapeutic Activity: (  23 Minutes(extra time to work through activity noted) : Therapeutic activities including bed mobility with extra time, sitting static & dynamic balance EOB ( wt-shifting R<>L, F<>B ) standing balance with walker static & wt-shifting R<>L. Pt also performed repeated sit<>stand & stand pivot transfer including transfer to [facility] to improve mobility, strength, balance and coordination. Braces/Orthotics/Lines/Etc:  
· IV 
· O2 Device: Other (comment)(airvo) Treatment/Session Assessment:   
· Response to Treatment:  Pt struggles through activity due to SOB. · Interdisciplinary Collaboration:  
o Registered Nurse 
o Certified Nursing Assistant/Patient Care Technician · After treatment position/precautions:  
o Supine in bed 
o Bed/Chair-wheels locked 
o Bed in low position 
o Call light within reach 
o RT at bedside · Compliance with Program/Exercises: Compliant all of the time · Recommendations/Intent for next treatment session: \"Next visit will focus on advancements to more challenging activities and reduction in assistance provided\". Total Treatment Duration: PT Patient Time In/Time Out Time In: 1518 Time Out: 1046 Anna Gamboa, PT

## 2019-02-08 NOTE — PROGRESS NOTES
Shift assessment completed. Pt alert and oriented x4. Lung sounds coarse with wheezing auscultated, on 50L hi-flow AirVo. Heart sounds regular, on remote telemetry. Active bowel sounds with soft and non-tender abdomen. Skin warm and dry. AV fistula access on left arm pink, bruit auscultated. IV c/d and capped. Pt reports no pain or other needs at this time. Bed locked in lowest position with call light in reach.

## 2019-02-08 NOTE — PROGRESS NOTES
Shift assessment completed via doc flow sheet. Patient is alert and oriented x 4. O2 sat 95% on high flow O2. Lung sounds coarse bilaterally. Heart sounds S1, S2 auscultated and regular. Abdomen semi- soft and non tender. Bowel sounds active to all 4 quadrants. Patient denies pain and other needs at this time. Bed is locked and in low position. Family at bedside. Bed rails x 3. Patient is encouraged to call for assistance. Call light within reach.

## 2019-02-08 NOTE — PROGRESS NOTES
Blood sugar 496. Dr. Anisa Salinas notified of blood sugar. Order to give the 10 units of Humalog and recheck in two orders and to call back.

## 2019-02-08 NOTE — PROGRESS NOTES
02/08/19 1132 Oxygen Therapy O2 Sat (%) 96 % Pulse via Oximetry 71 beats per minute O2 Device Heated; Hi flow nasal cannula;Humidifier O2 Flow Rate (L/min) 45 l/min O2 Temperature 87.8 °F (31 °C) FIO2 (%) 40 %  
pt. Decreased to 45l and fio2 of 40%, pt. Tolerating change well, no distress noted

## 2019-02-09 LAB
ABO + RH BLD: NORMAL
ALBUMIN SERPL-MCNC: 3.1 G/DL (ref 3.2–4.6)
ALBUMIN/GLOB SERPL: 0.8 {RATIO}
ALP SERPL-CCNC: 127 U/L (ref 50–136)
ALT SERPL-CCNC: 89 U/L (ref 12–65)
ANION GAP SERPL CALC-SCNC: 12 MMOL/L
AST SERPL-CCNC: 44 U/L (ref 15–37)
BASOPHILS # BLD: 0 K/UL (ref 0–0.2)
BASOPHILS NFR BLD: 0 % (ref 0–2)
BILIRUB SERPL-MCNC: 0.4 MG/DL (ref 0.2–1.1)
BLD PROD TYP BPU: NORMAL
BLD PROD TYP BPU: NORMAL
BLOOD GROUP ANTIBODIES SERPL: NORMAL
BPU ID: NORMAL
BPU ID: NORMAL
BUN SERPL-MCNC: 89 MG/DL (ref 8–23)
CALCIUM SERPL-MCNC: 8.6 MG/DL (ref 8.3–10.4)
CHLORIDE SERPL-SCNC: 99 MMOL/L (ref 98–107)
CO2 SERPL-SCNC: 25 MMOL/L (ref 21–32)
CREAT SERPL-MCNC: 3.87 MG/DL (ref 0.6–1)
CROSSMATCH RESULT,%XM: NORMAL
CROSSMATCH RESULT,%XM: NORMAL
DIFFERENTIAL METHOD BLD: ABNORMAL
EOSINOPHIL # BLD: 0 K/UL (ref 0–0.8)
EOSINOPHIL NFR BLD: 0 % (ref 0.5–7.8)
ERYTHROCYTE [DISTWIDTH] IN BLOOD BY AUTOMATED COUNT: 16 % (ref 11.9–14.6)
GLOBULIN SER CALC-MCNC: 3.9 G/DL (ref 2.3–3.5)
GLUCOSE BLD STRIP.AUTO-MCNC: 244 MG/DL (ref 65–100)
GLUCOSE BLD STRIP.AUTO-MCNC: 264 MG/DL (ref 65–100)
GLUCOSE BLD STRIP.AUTO-MCNC: 285 MG/DL (ref 65–100)
GLUCOSE BLD STRIP.AUTO-MCNC: 292 MG/DL (ref 65–100)
GLUCOSE BLD STRIP.AUTO-MCNC: 292 MG/DL (ref 65–100)
GLUCOSE BLD STRIP.AUTO-MCNC: 315 MG/DL (ref 65–100)
GLUCOSE SERPL-MCNC: 264 MG/DL (ref 65–100)
HCT VFR BLD AUTO: 31.7 % (ref 35.8–46.3)
HGB BLD-MCNC: 10.1 G/DL (ref 11.7–15.4)
IMM GRANULOCYTES # BLD AUTO: 0.2 K/UL (ref 0–0.5)
IMM GRANULOCYTES NFR BLD AUTO: 2 % (ref 0–5)
LYMPHOCYTES # BLD: 0.5 K/UL (ref 0.5–4.6)
LYMPHOCYTES NFR BLD: 5 % (ref 13–44)
MAGNESIUM SERPL-MCNC: 2.8 MG/DL (ref 1.8–2.4)
MCH RBC QN AUTO: 29.4 PG (ref 26.1–32.9)
MCHC RBC AUTO-ENTMCNC: 31.9 G/DL (ref 31.4–35)
MCV RBC AUTO: 92.4 FL (ref 79.6–97.8)
MONOCYTES # BLD: 0.3 K/UL (ref 0.1–1.3)
MONOCYTES NFR BLD: 3 % (ref 4–12)
NEUTS SEG # BLD: 10.9 K/UL (ref 1.7–8.2)
NEUTS SEG NFR BLD: 91 % (ref 43–78)
NRBC # BLD: 0.02 K/UL (ref 0–0.2)
PLATELET # BLD AUTO: 265 K/UL (ref 150–450)
PMV BLD AUTO: 11.7 FL (ref 9.4–12.3)
POTASSIUM SERPL-SCNC: 4.7 MMOL/L (ref 3.5–5.1)
PROT SERPL-MCNC: 7 G/DL
RBC # BLD AUTO: 3.43 M/UL (ref 4.05–5.2)
SODIUM SERPL-SCNC: 136 MMOL/L (ref 136–145)
SPECIMEN EXP DATE BLD: NORMAL
STATUS OF UNIT,%ST: NORMAL
STATUS OF UNIT,%ST: NORMAL
UNIT DIVISION, %UDIV: 0
UNIT DIVISION, %UDIV: 0
WBC # BLD AUTO: 12 K/UL (ref 4.3–11.1)

## 2019-02-09 PROCEDURE — 99218 HC RM OBSERVATION: CPT

## 2019-02-09 PROCEDURE — 74011250636 HC RX REV CODE- 250/636: Performed by: HOSPITALIST

## 2019-02-09 PROCEDURE — 74011636637 HC RX REV CODE- 636/637: Performed by: HOSPITALIST

## 2019-02-09 PROCEDURE — 74011250636 HC RX REV CODE- 250/636: Performed by: INTERNAL MEDICINE

## 2019-02-09 PROCEDURE — 77010033711 HC HIGH FLOW OXYGEN

## 2019-02-09 PROCEDURE — 74011636637 HC RX REV CODE- 636/637: Performed by: INTERNAL MEDICINE

## 2019-02-09 PROCEDURE — 97530 THERAPEUTIC ACTIVITIES: CPT

## 2019-02-09 PROCEDURE — 74011000250 HC RX REV CODE- 250: Performed by: INTERNAL MEDICINE

## 2019-02-09 PROCEDURE — 74011250637 HC RX REV CODE- 250/637: Performed by: INTERNAL MEDICINE

## 2019-02-09 PROCEDURE — 83735 ASSAY OF MAGNESIUM: CPT

## 2019-02-09 PROCEDURE — 36415 COLL VENOUS BLD VENIPUNCTURE: CPT

## 2019-02-09 PROCEDURE — 85025 COMPLETE CBC W/AUTO DIFF WBC: CPT

## 2019-02-09 PROCEDURE — 94640 AIRWAY INHALATION TREATMENT: CPT

## 2019-02-09 PROCEDURE — 82962 GLUCOSE BLOOD TEST: CPT

## 2019-02-09 PROCEDURE — 94760 N-INVAS EAR/PLS OXIMETRY 1: CPT

## 2019-02-09 PROCEDURE — 80053 COMPREHEN METABOLIC PANEL: CPT

## 2019-02-09 PROCEDURE — 74011000258 HC RX REV CODE- 258: Performed by: INTERNAL MEDICINE

## 2019-02-09 RX ORDER — INSULIN GLARGINE 100 [IU]/ML
20 INJECTION, SOLUTION SUBCUTANEOUS DAILY
Status: DISCONTINUED | OUTPATIENT
Start: 2019-02-10 | End: 2019-02-10

## 2019-02-09 RX ORDER — INSULIN LISPRO 100 [IU]/ML
6 INJECTION, SOLUTION INTRAVENOUS; SUBCUTANEOUS
Status: DISCONTINUED | OUTPATIENT
Start: 2019-02-09 | End: 2019-02-10

## 2019-02-09 RX ADMIN — PANTOPRAZOLE SODIUM 40 MG: 40 TABLET, DELAYED RELEASE ORAL at 08:31

## 2019-02-09 RX ADMIN — ALLOPURINOL 100 MG: 100 TABLET ORAL at 08:30

## 2019-02-09 RX ADMIN — IPRATROPIUM BROMIDE AND ALBUTEROL SULFATE 3 ML: .5; 3 SOLUTION RESPIRATORY (INHALATION) at 11:39

## 2019-02-09 RX ADMIN — IPRATROPIUM BROMIDE AND ALBUTEROL SULFATE 3 ML: .5; 3 SOLUTION RESPIRATORY (INHALATION) at 07:34

## 2019-02-09 RX ADMIN — FUROSEMIDE 40 MG: 40 TABLET ORAL at 08:31

## 2019-02-09 RX ADMIN — MONTELUKAST SODIUM 10 MG: 10 TABLET, FILM COATED ORAL at 23:44

## 2019-02-09 RX ADMIN — INSULIN LISPRO 6 UNITS: 100 INJECTION, SOLUTION INTRAVENOUS; SUBCUTANEOUS at 23:42

## 2019-02-09 RX ADMIN — APIXABAN 2.5 MG: 2.5 TABLET, FILM COATED ORAL at 23:53

## 2019-02-09 RX ADMIN — METHYLPREDNISOLONE SODIUM SUCCINATE 40 MG: 40 INJECTION, POWDER, FOR SOLUTION INTRAMUSCULAR; INTRAVENOUS at 06:17

## 2019-02-09 RX ADMIN — Medication 10 ML: at 14:18

## 2019-02-09 RX ADMIN — GUAIFENESIN 600 MG: 600 TABLET, EXTENDED RELEASE ORAL at 08:30

## 2019-02-09 RX ADMIN — INSULIN GLARGINE 10 UNITS: 100 INJECTION, SOLUTION SUBCUTANEOUS at 10:44

## 2019-02-09 RX ADMIN — IPRATROPIUM BROMIDE AND ALBUTEROL SULFATE 3 ML: .5; 3 SOLUTION RESPIRATORY (INHALATION) at 16:30

## 2019-02-09 RX ADMIN — BUDESONIDE 500 MCG: 0.5 INHALANT RESPIRATORY (INHALATION) at 19:28

## 2019-02-09 RX ADMIN — INSULIN LISPRO 8 UNITS: 100 INJECTION, SOLUTION INTRAVENOUS; SUBCUTANEOUS at 17:37

## 2019-02-09 RX ADMIN — INSULIN LISPRO 6 UNITS: 100 INJECTION, SOLUTION INTRAVENOUS; SUBCUTANEOUS at 17:36

## 2019-02-09 RX ADMIN — ASPIRIN 81 MG: 81 TABLET, DELAYED RELEASE ORAL at 08:30

## 2019-02-09 RX ADMIN — CEFTRIAXONE 1 G: 1 INJECTION, POWDER, FOR SOLUTION INTRAMUSCULAR; INTRAVENOUS at 12:32

## 2019-02-09 RX ADMIN — IPRATROPIUM BROMIDE AND ALBUTEROL SULFATE 3 ML: .5; 3 SOLUTION RESPIRATORY (INHALATION) at 03:50

## 2019-02-09 RX ADMIN — FERROUS SULFATE TAB 325 MG (65 MG ELEMENTAL FE) 325 MG: 325 (65 FE) TAB at 08:31

## 2019-02-09 RX ADMIN — POTASSIUM CHLORIDE 10 MEQ: 10 TABLET, EXTENDED RELEASE ORAL at 08:30

## 2019-02-09 RX ADMIN — METHYLPREDNISOLONE SODIUM SUCCINATE 40 MG: 40 INJECTION, POWDER, FOR SOLUTION INTRAMUSCULAR; INTRAVENOUS at 14:18

## 2019-02-09 RX ADMIN — BUDESONIDE 500 MCG: 0.5 INHALANT RESPIRATORY (INHALATION) at 07:34

## 2019-02-09 RX ADMIN — GUAIFENESIN 600 MG: 600 TABLET, EXTENDED RELEASE ORAL at 23:44

## 2019-02-09 RX ADMIN — INSULIN LISPRO 6 UNITS: 100 INJECTION, SOLUTION INTRAVENOUS; SUBCUTANEOUS at 10:44

## 2019-02-09 RX ADMIN — VERAPAMIL HYDROCHLORIDE 120 MG: 240 TABLET, FILM COATED, EXTENDED RELEASE ORAL at 23:45

## 2019-02-09 RX ADMIN — IPRATROPIUM BROMIDE AND ALBUTEROL SULFATE 3 ML: .5; 3 SOLUTION RESPIRATORY (INHALATION) at 23:39

## 2019-02-09 RX ADMIN — LORATADINE 10 MG: 10 TABLET ORAL at 08:31

## 2019-02-09 RX ADMIN — Medication 5 ML: at 23:45

## 2019-02-09 RX ADMIN — IPRATROPIUM BROMIDE AND ALBUTEROL SULFATE 3 ML: .5; 3 SOLUTION RESPIRATORY (INHALATION) at 19:28

## 2019-02-09 RX ADMIN — FLUTICASONE PROPIONATE 2 SPRAY: 50 SPRAY, METERED NASAL at 08:34

## 2019-02-09 RX ADMIN — INSULIN LISPRO 6 UNITS: 100 INJECTION, SOLUTION INTRAVENOUS; SUBCUTANEOUS at 12:38

## 2019-02-09 RX ADMIN — AMIODARONE HYDROCHLORIDE 200 MG: 200 TABLET ORAL at 08:31

## 2019-02-09 RX ADMIN — INSULIN LISPRO 6 UNITS: 100 INJECTION, SOLUTION INTRAVENOUS; SUBCUTANEOUS at 04:34

## 2019-02-09 RX ADMIN — AZITHROMYCIN 500 MG: 250 TABLET, FILM COATED ORAL at 08:30

## 2019-02-09 RX ADMIN — ATORVASTATIN CALCIUM 40 MG: 40 TABLET, FILM COATED ORAL at 23:53

## 2019-02-09 RX ADMIN — Medication 5 ML: at 06:21

## 2019-02-09 RX ADMIN — APIXABAN 2.5 MG: 2.5 TABLET, FILM COATED ORAL at 08:31

## 2019-02-09 RX ADMIN — METHYLPREDNISOLONE SODIUM SUCCINATE 40 MG: 40 INJECTION, POWDER, FOR SOLUTION INTRAMUSCULAR; INTRAVENOUS at 23:44

## 2019-02-09 NOTE — PROGRESS NOTES
Problem: Mobility Impaired (Adult and Pediatric) Goal: *Acute Goals and Plan of Care (Insert Text) DISCHARGE GOALS: 
(1.)Ms. Uche Scott will move from supine to sit and sit to supine  in bed with SUPERVISION within 5 treatment day(s). (2.)Ms. Uche Scott will transfer from bed to chair and chair to bed with SUPERVISION using the least restrictive device within 5 treatment day(s). (3.)Ms. Uche Scott will ambulate with SUPERVISION for 25 feet with the least restrictive device within 5 treatment day(s). ________________________________________________________________________________________________ PHYSICAL THERAPY: Daily Note and AM 2/9/2019OBSERVATION: PT Visit Days : 1 Payor: Linh Dyer / Plan: [address] [place] [address] HMO / Product Type: [employer] Medicare /   
  
NAME/AGE/GENDER: Bautista Burrell is a 82 y.o. female PRIMARY DIAGNOSIS: Acute on chronic respiratory failure with hypoxia ([address] [J96.21] Acute on chronic respiratory failure with hypoxia (HCC) [J96.21] Acute on chronic respiratory failure with hypoxia (HCC) Acute on chronic respiratory failure with hypoxia (HCC) ICD-10: Treatment Diagnosis:  
 · Generalized Muscle Weakness (M62.81) · Difficulty in walking, Not elsewhere classified (R26.2) Precaution/Allergies: 
Alprazolam; Demerol [meperidine]; Levaquin [levofloxacin]; Oxycodone; Pepcid [famotidine]; Phenergan [promethazine]; Sitagliptin; and Sulfa (sulfonamide antibiotics) ASSESSMENT:  
Ms. Uche Scott was easily SOB with minimal exertion while on airvo. PT worked on repeated sit<>Stand & stand pivot transfer using a walker. Also had pt practice wt shifting while seated EOB & while standing with walker. This pt is non-functional currently due to MARQUEZ. Pt has a good DC plan to home with caregivers. This section established at most recent assessment PROBLEM LIST (Impairments causing functional limitations): 1. Decreased Strength 2. Decreased ADL/Functional Activities 3. Decreased Transfer Abilities 4. Decreased Ambulation Ability/Technique 5. Decreased Balance 6. Decreased Activity Tolerance 7. Increased Shortness of Breath 8. Edema/Girth INTERVENTIONS PLANNED: (Benefits and precautions of physical therapy have been discussed with the patient.) 1. Balance Exercise 2. Bed Mobility 3. Family Education 4. Gait Training 5. Therapeutic Activites 6. Therapeutic Exercise/Strengthening TREATMENT PLAN: Frequency/Duration: daily for duration of hospital stay Rehabilitation Potential For Stated Goals: Good RECOMMENDED REHABILITATION/EQUIPMENT: (at time of discharge pending progress): Due to the probability of continued deficits (see above) this patient will likely need continued skilled physical therapy after discharge. Equipment:  
? None at this time HISTORY:  
History of Present Injury/Illness (Reason for Referral): 
Pt 83F with pmhx of CABG x 4, WILLIAM does not use CPAP, CHF, Afib on eliquis and amiodarone, CKD stage 5 (baseline Cr 3-4) s/p recent AVF placement on 1/31, DM2 presents with report of increasing wheeezing, cough and fever/chills for last 3-4 days. She has COPD and usually wears 2.5 L during day. Was found hypoxic at 85% on this amount. Now requiring 4lnc. Also noted to have red/warm LUE s/p recent AV graft placement CXR report RLL infiltrate. WBC 10K, hgb 7.6 (usually hgb around 8.5) 
  Hospitalist asked to admit for COPDe and LUE cellulitis. Past Medical History/Comorbidities: Ms. Erma Mcclain  has a past medical history of Adverse effect of anesthesia, Anemia, Anxiety, Asthma, Atrial flutter ([address] CAD (coronary artery disease) (2013), Cancer ([address] Chronic kidney disease, Chronic obstructive pulmonary disease ([address] Chronic pain, Constipation, DDD (degenerative disc disease), lumbar, Depression, Fatty liver, GERD (gastroesophageal reflux disease), Heart abnormality, Heart failure ([address] Hypercholesterolemia, Hypertension, Kidney disease, Morbid obesity ([facility] Nausea & vomiting, Osteoarthritis, PUD (peptic ulcer disease), Sleep apnea, Type 2 diabetes mellitus ([address] and Vitamin B12 deficiency. Ms. Evelyn Bolaños  has a past surgical history that includes hx heart catheterization (2013); pr cabg, artery-vein, four (2013); vascular surgery procedure unlist (Right); hx hysterectomy; hx gyn; hx other surgical (1992); pr neurological procedure unlisted; hx appendectomy; hx tonsillectomy; and hx colonoscopy. Social History/Living Environment:  
Home Environment: Private residence Wheelchair Ramp: Yes One/Two Story Residence: Two story(basement - lives on main level and doesn't use basement) Living Alone: No 
Support Systems: Family member(s), Spouse/Significant Other/Partner Patient Expects to be Discharged to[de-identified] Private residence Current DME Used/Available at Home: Cane, straight, Commode, bedside, Glucometer, Nebulizer, Walker, rollator, Wheelchair Tub or Shower Type: Other (comment)(walk-in tub/shower with built in seat) Prior Level of Function/Work/Activity: 
Limited ambulation-using rollator or wheelchair. Number of Personal Factors/Comorbidities that affect the Plan of Care: 1-2: MODERATE COMPLEXITY EXAMINATION:  
Most Recent Physical Functioning:  
Gross Assessment: 3-/5 throughout Balance: 
Sitting: Intact; Without support Standing: With support Bed Mobility: 
Supine to Sit: Stand-by assistance Transfers: 
Sit to Stand: Minimum assistance Stand to Sit: Minimum assistance Bed to Chair: Contact guard assistance Gait: 
  
Base of Support: Other (comment)(slow, shuffled gait) Speed/Kari: Shuffled; Slow Step Length: Left shortened;Right shortened Gait Abnormalities: Decreased step clearance Distance (ft): 4 Feet (ft)(decrease endurance) Assistive Device: Walker, rolling Ambulation - Level of Assistance: Minimal assistance Interventions: Safety awareness training Body Structures Involved: 1. Muscles Body Functions Affected: 1. Movement Related Activities and Participation Affected: 1. Mobility 2. Self Care Number of elements that affect the Plan of Care: 4+: HIGH COMPLEXITY CLINICAL PRESENTATION:  
Presentation: Stable and uncomplicated: LOW COMPLEXITY CLINICAL DECISION MAKING:  
[facility] [employer] AM-PAC 6 Clicks Basic Mobility Inpatient Short Form How much difficulty does the patient currently have. .. Unable A Lot A Little None 1. Turning over in bed (including adjusting bedclothes, sheets and blankets)? [] 1   [] 2   [x] 3   [] 4  
2. Sitting down on and standing up from a chair with arms ( e.g., wheelchair, bedside commode, etc.)   [] 1   [] 2   [x] 3   [] 4  
3. Moving from lying on back to sitting on the side of the bed? [] 1   [] 2   [x] 3   [] 4 How much help from another person does the patient currently need. .. Total A Lot A Little None 4. Moving to and from a bed to a chair (including a wheelchair)? [] 1   [] 2   [x] 3   [] 4  
5. Need to walk in hospital room? [] 1   [] 2   [x] 3   [] 4  
6. Climbing 3-5 steps with a railing? [] 1   [x] 2   [] 3   [] 4  
© 2007, Trustees of [facility], under license to [employer]. All rights reserved Score:  Initial: 17 Most Recent: X (Date: -- ) Interpretation of Tool:  Represents activities that are increasingly more difficult (i.e. Bed mobility, Transfers, Gait). Medical Necessity:    
· Patient is expected to demonstrate progress in strength, balance and coordination to increase independence with mobility and improve tolerance for activity. Reason for Services/Other Comments: 
· Patient continues to require skilled intervention due to decreased strength and mobility.   
Use of outcome tool(s) and clinical judgement create a POC that gives a: Clear prediction of patient's progress: LOW COMPLEXITY  
  
 
 
 
TREATMENT:  
 (In addition to Assessment/Re-Assessment sessions the following treatments were rendered) Pre-treatment Symptoms/Complaints: very weak & easily SOB Pain: Initial: visual scale Post Session:  0/10 Therapeutic Activity: ( 30)   : Therapeutic activities including bed mobility with extra time, sitting static & dynamic balance EOB ( wt-shifting R<>L, F<>B ) standing balance with walker static & wt-shifting R<>L. Pt also performed repeated sit<>stand & stand pivot transfer including transfer to [facility] to improve mobility, strength, balance and coordination. Patient also ambulated with Rolling walker to chair 4 feet. Braces/Orthotics/Lines/Etc:  
· IV 
· O2 Device: Other (comment)(airvo) Treatment/Session Assessment:   
· Response to Treatment:  Pt struggles through activity due to SOB. · Interdisciplinary Collaboration:  
o Registered Nurse 
o Certified Nursing Assistant/Patient Care Technician · After treatment position/precautions:  
o Up in chair 
o Bed alarm/tab alert on 
o Bed/Chair-wheels locked 
o Bed in low position 
o Call light within reach 
o RN notified · Compliance with Program/Exercises: Compliant all of the time · Recommendations/Intent for next treatment session: \"Next visit will focus on advancements to more challenging activities and reduction in assistance provided\". Total Treatment Duration: PT Patient Time In/Time Out Time In: 0800 Time Out: 0830 Marleny Cline PTA

## 2019-02-09 NOTE — PROGRESS NOTES
Hospitalist Progress Note Subjective:  
Daily Progress Note: 2/9/2019 12:38 PM 
 
Ms. Nica Mcgill is an 81 yo obese white female with a pmh of WILLIAM (not on cpap), CHF, Afib on eliquis and amiodarone, CKD stage 5 (baseline Cr 3-4) s/p recent AVF placement on 1/31, DM2 who presented 2/6 with report of increasing wheeezing, cough and fever/chills for last 3-4 days. She has COPD and usually wears 2.5 L during day ( see Dr. Joon Back at [facility] ) she was found hypoxic at 85% in ED and was admitted for COPD flare up and possible Left arm cellulitis as there was tenderness and swelling. Placed on zosyn and vancomycin. LUE AVG evaluated by vascular surgery and graft is NOT infected and with thrill. She wears 2.5 liters O2 at baseline. 2/9:  
Pt seen at bedside Still on AirVo 30 ltrs Pt is sitting comfortably in bedside recliner. She hasn't moved around much since admission. She reports some hardening of left fore arm fistula, denies any pain though. No nausea/vomiting, chills, fever, chest pain, headache, diarrhea. Current Facility-Administered Medications Medication Dose Route Frequency  insulin glargine (LANTUS) injection 10 Units  10 Units SubCUTAneous DAILY  cefTRIAXone (ROCEPHIN) 1 g in 0.9% sodium chloride (MBP/ADV) 50 mL  1 g IntraVENous Q24H  
 azithromycin (ZITHROMAX) tablet 500 mg  500 mg Oral DAILY  0.9% sodium chloride infusion 250 mL  250 mL IntraVENous PRN  
 0.9% sodium chloride infusion 250 mL  250 mL IntraVENous PRN  
 hydrALAZINE (APRESOLINE) 20 mg/mL injection 20 mg  20 mg IntraVENous Q6H PRN  
 albuterol-ipratropium (DUO-NEB) 2.5 MG-0.5 MG/3 ML  3 mL Nebulization Q4H RT  
 methylPREDNISolone (PF) (SOLU-MEDROL) injection 40 mg  40 mg IntraVENous Q8H  
 allopurinol (ZYLOPRIM) tablet 100 mg  100 mg Oral DAILY  amiodarone (CORDARONE) tablet 200 mg  200 mg Oral DAILY  apixaban (ELIQUIS) tablet 2.5 mg  2.5 mg Oral Q12H  aspirin delayed-release tablet 81 mg  81 mg Oral DAILY  loratadine (CLARITIN) tablet 10 mg  10 mg Oral DAILY  ferrous sulfate tablet 325 mg  325 mg Oral ACB  fluticasone (FLONASE) 50 mcg/actuation nasal spray 2 Spray  2 Spray Both Nostrils DAILY  furosemide (LASIX) tablet 40 mg  40 mg Oral DAILY  budesonide (PULMICORT) 500 mcg/2 ml nebulizer suspension  500 mcg Nebulization BID RT  
 insulin lispro (HUMALOG) injection   SubCUTAneous AC&HS  
 LORazepam (ATIVAN) tablet 0.5 mg  0.5 mg Oral TID PRN  
 montelukast (SINGULAIR) tablet 10 mg  10 mg Oral QHS  pantoprazole (PROTONIX) tablet 40 mg  40 mg Oral ACB  potassium chloride (KLOR-CON) tablet 10 mEq  10 mEq Oral DAILY  traMADol (ULTRAM) tablet 50 mg  50 mg Oral Q8H PRN  
 verapamil ER (CALAN-SR) tablet 120 mg  120 mg Oral QHS  guaiFENesin ER (MUCINEX) tablet 600 mg  600 mg Oral Q12H  
 sodium chloride (NS) flush 5-40 mL  5-40 mL IntraVENous Q8H  
 sodium chloride (NS) flush 5-40 mL  5-40 mL IntraVENous PRN  
 atorvastatin (LIPITOR) tablet 40 mg  40 mg Oral QHS Review of Systems A comprehensive review of systems was negative except for that written in the HPI. Objective:  
 
Visit Vitals /74 (BP 1 Location: Right arm, BP Patient Position: At rest;Sitting) Pulse 84 Temp 96.7 °F (35.9 °C) Resp 22 Ht 5' 2\" (1.575 m) Wt 106.6 kg (235 lb) SpO2 96% BMI 42.98 kg/m² O2 Flow Rate (L/min): 30 l/min O2 Device: Hi flow nasal cannula, Heated, Humidifier Temp (24hrs), Av.1 °F (36.2 °C), Min:96 °F (35.6 °C), Max:98.3 °F (36.8 °C) 
 
 
701 - 1900 In: -  
Out: 400 [Urine:400] 1901 - 700 In: 701.1 Out: 4400 [MERSB:6424] General: obese, ill appearing, mildly distressed, awake/alert/oriented Eyes; non icteric, EOMI Neck; supple CV; RRR Pulm: coarse, diminished on right, mild expiratory wheezing present Abd; soft, non tender, active BS Skin/ext: left ABG with thrill, no erythema but mild swelling to site Additional comments:I reviewed the patient's new clinical lab test results. blood sugars, labs, chest xray Data Review Recent Results (from the past 24 hour(s)) GLUCOSE, POC Collection Time: 02/08/19  4:00 PM  
Result Value Ref Range Glucose (POC) 496 (HH) 65 - 100 mg/dL GLUCOSE, POC Collection Time: 02/08/19  5:47 PM  
Result Value Ref Range Glucose (POC) 400 (H) 65 - 100 mg/dL GLUCOSE, POC Collection Time: 02/08/19  7:47 PM  
Result Value Ref Range Glucose (POC) 304 (H) 65 - 100 mg/dL GLUCOSE, POC Collection Time: 02/09/19 12:04 AM  
Result Value Ref Range Glucose (POC) 244 (H) 65 - 100 mg/dL CBC WITH AUTOMATED DIFF Collection Time: 02/09/19  2:33 AM  
Result Value Ref Range WBC 12.0 (H) 4.3 - 11.1 K/uL  
 RBC 3.43 (L) 4.05 - 5.2 M/uL  
 HGB 10.1 (L) 11.7 - 15.4 g/dL HCT 31.7 (L) 35.8 - 46.3 % MCV 92.4 79.6 - 97.8 FL  
 MCH 29.4 26.1 - 32.9 PG  
 MCHC 31.9 31.4 - 35.0 g/dL  
 RDW 16.0 (H) 11.9 - 14.6 % PLATELET 760 412 - 572 K/uL MPV 11.7 9.4 - 12.3 FL ABSOLUTE NRBC 0.02 0.0 - 0.2 K/uL  
 DF AUTOMATED NEUTROPHILS 91 (H) 43 - 78 % LYMPHOCYTES 5 (L) 13 - 44 % MONOCYTES 3 (L) 4.0 - 12.0 % EOSINOPHILS 0 (L) 0.5 - 7.8 % BASOPHILS 0 0.0 - 2.0 % IMMATURE GRANULOCYTES 2 0.0 - 5.0 %  
 ABS. NEUTROPHILS 10.9 (H) 1.7 - 8.2 K/UL  
 ABS. LYMPHOCYTES 0.5 0.5 - 4.6 K/UL  
 ABS. MONOCYTES 0.3 0.1 - 1.3 K/UL  
 ABS. EOSINOPHILS 0.0 0.0 - 0.8 K/UL  
 ABS. BASOPHILS 0.0 0.0 - 0.2 K/UL  
 ABS. IMM. GRANS. 0.2 0.0 - 0.5 K/UL MAGNESIUM Collection Time: 02/09/19  2:33 AM  
Result Value Ref Range Magnesium 2.8 (H) 1.8 - 2.4 mg/dL METABOLIC PANEL, COMPREHENSIVE Collection Time: 02/09/19  2:33 AM  
Result Value Ref Range Sodium 136 136 - 145 mmol/L Potassium 4.7 3.5 - 5.1 mmol/L Chloride 99 98 - 107 mmol/L  
 CO2 25 21 - 32 mmol/L Anion gap 12 mmol/L Glucose 264 (H) 65 - 100 mg/dL  BUN 89 (H) 8 - 23 MG/DL  
 Creatinine 3.87 (H) 0.6 - 1.0 MG/DL  
 GFR est AA 14 (L) >60 ml/min/1.73m2 GFR est non-AA 12 ml/min/1.73m2 Calcium 8.6 8.3 - 10.4 MG/DL Bilirubin, total 0.4 0.2 - 1.1 MG/DL  
 ALT (SGPT) 89 (H) 12 - 65 U/L  
 AST (SGOT) 44 (H) 15 - 37 U/L Alk. phosphatase 127 50 - 136 U/L Protein, total 7.0 g/dL Albumin 3.1 (L) 3.2 - 4.6 g/dL Globulin 3.9 (H) 2.3 - 3.5 g/dL A-G Ratio 0.8 GLUCOSE, POC Collection Time: 02/09/19  4:20 AM  
Result Value Ref Range Glucose (POC) 292 (H) 65 - 100 mg/dL GLUCOSE, POC Collection Time: 02/09/19  7:40 AM  
Result Value Ref Range Glucose (POC) 264 (H) 65 - 100 mg/dL GLUCOSE, POC Collection Time: 02/09/19 11:19 AM  
Result Value Ref Range Glucose (POC) 292 (H) 65 - 100 mg/dL Assessment/Plan:  
 
Principal Problem: 
Acute on chronic respiratory failure with hypoxia ([facility] (2/6/2019) Multifactorial.  Continue solumedrol, started weaning q8h --> q12h and duonebs q4 with bid mucinex. Continue pulmicort 
mucinex Continue IV rocephin and azithromycin S/p 2 units of PRBC, Hb 10.1 Will get arterial doppler of LUE to r/o blood clot Active Problems: 
  Severe obesity ([facility] [address] (1/24/2019) Stage 4 chronic kidney disease ([facility] [address] (1/24/2019) SCr stable at this time. COPD exacerbation ([address] (2/6/2019) As above Anemia (2/6/2019) Pulmonary infiltrate in right lung on chest x-ray (2/6/2019) WILLIAM (obstructive sleep apnea) (2/7/2019) Overview: Not using CPAP See Dr. Ernestina Cadet at [facility] Uncontrolled type 2 diabetes mellitus with hyperglycemia ([facility] [address] [facility] (2/8/2019) On SSI. Oral agents held. BS's not controlled as exacerbated by steroids. Increased lantus to 20 units qhs Anemia of renal disease (2/8/2019) S/p 2 units of PRBC Patient is high risk for decompensation due to precarious respiratory status Care Plan discussed with: Patient/Family and Nurse Signed By: Katarzyna Wise MD   
 February 9, 2019

## 2019-02-09 NOTE — PROGRESS NOTES
Shift assessment complete. Pt resting in bed comfortably. Alert and oriented x4. Expiratory wheezing heard on auscultation. SOB at rest and with exertion. Pt on high flow 02. HR regular. Tele on per MD order. Abdomen obese, soft with active bowel sounds heard in all four quadrants. Left arteriovenous access assessed, bruit and thrill present. IV patent and capped. Skin intact, no edema noted. Pt denies pain and nausea. PT at bedside. Safety measures in place, call light within reach. Will continue to monitor.

## 2019-02-09 NOTE — PROGRESS NOTES
Resting quietly, awake, resp labored on exertion with O2 via airvo, improved at rest. AP 96, lungs sounds with faint expiratory wheezes auscultated, diminished. Humalog insulin 8 units given SC for SQBS 304. Assessment noted. Hydralazine 20 mg given IVP slowly for /77, per orders after reporting to Dr. Dawna Dias.

## 2019-02-09 NOTE — PROGRESS NOTES
OT NOte- Attempted to see pt this date for therapy. Pt with c/o fatigue after PT and asked OT to come back another time. Will check back as schedule permits.  
 
Lico Martinez, OTR/L

## 2019-02-10 LAB
ALBUMIN SERPL-MCNC: 3 G/DL (ref 3.2–4.6)
ALBUMIN/GLOB SERPL: 0.8 {RATIO}
ALP SERPL-CCNC: 114 U/L (ref 50–136)
ALT SERPL-CCNC: 73 U/L (ref 12–65)
ANION GAP SERPL CALC-SCNC: 14 MMOL/L
AST SERPL-CCNC: 32 U/L (ref 15–37)
BASOPHILS # BLD: 0 K/UL (ref 0–0.2)
BASOPHILS NFR BLD: 0 % (ref 0–2)
BILIRUB SERPL-MCNC: 0.4 MG/DL (ref 0.2–1.1)
BUN SERPL-MCNC: 105 MG/DL (ref 8–23)
CALCIUM SERPL-MCNC: 8.5 MG/DL (ref 8.3–10.4)
CHLORIDE SERPL-SCNC: 98 MMOL/L (ref 98–107)
CO2 SERPL-SCNC: 26 MMOL/L (ref 21–32)
CREAT SERPL-MCNC: 3.8 MG/DL (ref 0.6–1)
DIFFERENTIAL METHOD BLD: ABNORMAL
EOSINOPHIL # BLD: 0 K/UL (ref 0–0.8)
EOSINOPHIL NFR BLD: 0 % (ref 0.5–7.8)
ERYTHROCYTE [DISTWIDTH] IN BLOOD BY AUTOMATED COUNT: 16.1 % (ref 11.9–14.6)
GLOBULIN SER CALC-MCNC: 3.8 G/DL (ref 2.3–3.5)
GLUCOSE BLD STRIP.AUTO-MCNC: 197 MG/DL (ref 65–100)
GLUCOSE BLD STRIP.AUTO-MCNC: 267 MG/DL (ref 65–100)
GLUCOSE BLD STRIP.AUTO-MCNC: 294 MG/DL (ref 65–100)
GLUCOSE BLD STRIP.AUTO-MCNC: 304 MG/DL (ref 65–100)
GLUCOSE SERPL-MCNC: 247 MG/DL (ref 65–100)
HCT VFR BLD AUTO: 31.2 % (ref 35.8–46.3)
HGB BLD-MCNC: 9.9 G/DL (ref 11.7–15.4)
IMM GRANULOCYTES # BLD AUTO: 0.2 K/UL (ref 0–0.5)
IMM GRANULOCYTES NFR BLD AUTO: 1 % (ref 0–5)
LYMPHOCYTES # BLD: 0.7 K/UL (ref 0.5–4.6)
LYMPHOCYTES NFR BLD: 6 % (ref 13–44)
MCH RBC QN AUTO: 29.4 PG (ref 26.1–32.9)
MCHC RBC AUTO-ENTMCNC: 31.7 G/DL (ref 31.4–35)
MCV RBC AUTO: 92.6 FL (ref 79.6–97.8)
MONOCYTES # BLD: 0.5 K/UL (ref 0.1–1.3)
MONOCYTES NFR BLD: 4 % (ref 4–12)
NEUTS SEG # BLD: 10 K/UL (ref 1.7–8.2)
NEUTS SEG NFR BLD: 88 % (ref 43–78)
NRBC # BLD: 0 K/UL (ref 0–0.2)
PLATELET # BLD AUTO: 316 K/UL (ref 150–450)
PMV BLD AUTO: 11.6 FL (ref 9.4–12.3)
POTASSIUM SERPL-SCNC: 4.1 MMOL/L (ref 3.5–5.1)
PROT SERPL-MCNC: 6.8 G/DL
RBC # BLD AUTO: 3.37 M/UL (ref 4.05–5.2)
SODIUM SERPL-SCNC: 138 MMOL/L (ref 136–145)
WBC # BLD AUTO: 11.3 K/UL (ref 4.3–11.1)

## 2019-02-10 PROCEDURE — 77030021668 HC NEB PREFIL KT VYRM -A

## 2019-02-10 PROCEDURE — 85025 COMPLETE CBC W/AUTO DIFF WBC: CPT

## 2019-02-10 PROCEDURE — 82962 GLUCOSE BLOOD TEST: CPT

## 2019-02-10 PROCEDURE — 97530 THERAPEUTIC ACTIVITIES: CPT

## 2019-02-10 PROCEDURE — 74011000258 HC RX REV CODE- 258: Performed by: INTERNAL MEDICINE

## 2019-02-10 PROCEDURE — 74011250637 HC RX REV CODE- 250/637: Performed by: INTERNAL MEDICINE

## 2019-02-10 PROCEDURE — 99218 HC RM OBSERVATION: CPT

## 2019-02-10 PROCEDURE — 74011000250 HC RX REV CODE- 250: Performed by: INTERNAL MEDICINE

## 2019-02-10 PROCEDURE — 94640 AIRWAY INHALATION TREATMENT: CPT

## 2019-02-10 PROCEDURE — 74011250636 HC RX REV CODE- 250/636: Performed by: FAMILY MEDICINE

## 2019-02-10 PROCEDURE — 74011250637 HC RX REV CODE- 250/637: Performed by: HOSPITALIST

## 2019-02-10 PROCEDURE — 94760 N-INVAS EAR/PLS OXIMETRY 1: CPT

## 2019-02-10 PROCEDURE — 74011250636 HC RX REV CODE- 250/636: Performed by: HOSPITALIST

## 2019-02-10 PROCEDURE — 74011636637 HC RX REV CODE- 636/637: Performed by: INTERNAL MEDICINE

## 2019-02-10 PROCEDURE — 36415 COLL VENOUS BLD VENIPUNCTURE: CPT

## 2019-02-10 PROCEDURE — 74011636637 HC RX REV CODE- 636/637: Performed by: HOSPITALIST

## 2019-02-10 PROCEDURE — 77010033678 HC OXYGEN DAILY

## 2019-02-10 PROCEDURE — 80053 COMPREHEN METABOLIC PANEL: CPT

## 2019-02-10 PROCEDURE — 74011250636 HC RX REV CODE- 250/636: Performed by: INTERNAL MEDICINE

## 2019-02-10 RX ORDER — CALCIUM ACETATE 667 MG/1
2 CAPSULE ORAL
Status: DISCONTINUED | OUTPATIENT
Start: 2019-02-10 | End: 2019-02-13 | Stop reason: HOSPADM

## 2019-02-10 RX ORDER — CARVEDILOL 6.25 MG/1
6.25 TABLET ORAL 2 TIMES DAILY WITH MEALS
Status: DISCONTINUED | OUTPATIENT
Start: 2019-02-10 | End: 2019-02-13 | Stop reason: HOSPADM

## 2019-02-10 RX ORDER — INSULIN LISPRO 100 [IU]/ML
10 INJECTION, SOLUTION INTRAVENOUS; SUBCUTANEOUS
Status: DISCONTINUED | OUTPATIENT
Start: 2019-02-10 | End: 2019-02-13 | Stop reason: HOSPADM

## 2019-02-10 RX ORDER — INSULIN GLARGINE 100 [IU]/ML
30 INJECTION, SOLUTION SUBCUTANEOUS DAILY
Status: DISCONTINUED | OUTPATIENT
Start: 2019-02-11 | End: 2019-02-12

## 2019-02-10 RX ADMIN — METHYLPREDNISOLONE SODIUM SUCCINATE 40 MG: 40 INJECTION, POWDER, FOR SOLUTION INTRAMUSCULAR; INTRAVENOUS at 21:38

## 2019-02-10 RX ADMIN — INSULIN LISPRO 6 UNITS: 100 INJECTION, SOLUTION INTRAVENOUS; SUBCUTANEOUS at 12:41

## 2019-02-10 RX ADMIN — INSULIN LISPRO 8 UNITS: 100 INJECTION, SOLUTION INTRAVENOUS; SUBCUTANEOUS at 12:40

## 2019-02-10 RX ADMIN — ALLOPURINOL 100 MG: 100 TABLET ORAL at 09:41

## 2019-02-10 RX ADMIN — CALCIUM ACETATE 1334 MG: 667 CAPSULE ORAL at 17:30

## 2019-02-10 RX ADMIN — INSULIN LISPRO 2 UNITS: 100 INJECTION, SOLUTION INTRAVENOUS; SUBCUTANEOUS at 21:34

## 2019-02-10 RX ADMIN — MONTELUKAST SODIUM 10 MG: 10 TABLET, FILM COATED ORAL at 21:38

## 2019-02-10 RX ADMIN — Medication 5 ML: at 22:41

## 2019-02-10 RX ADMIN — INSULIN LISPRO 6 UNITS: 100 INJECTION, SOLUTION INTRAVENOUS; SUBCUTANEOUS at 09:45

## 2019-02-10 RX ADMIN — IPRATROPIUM BROMIDE AND ALBUTEROL SULFATE 3 ML: .5; 3 SOLUTION RESPIRATORY (INHALATION) at 08:02

## 2019-02-10 RX ADMIN — Medication 10 ML: at 13:09

## 2019-02-10 RX ADMIN — FLUTICASONE PROPIONATE 2 SPRAY: 50 SPRAY, METERED NASAL at 09:46

## 2019-02-10 RX ADMIN — GUAIFENESIN 600 MG: 600 TABLET, EXTENDED RELEASE ORAL at 21:36

## 2019-02-10 RX ADMIN — LORATADINE 10 MG: 10 TABLET ORAL at 09:40

## 2019-02-10 RX ADMIN — IPRATROPIUM BROMIDE AND ALBUTEROL SULFATE 3 ML: .5; 3 SOLUTION RESPIRATORY (INHALATION) at 11:34

## 2019-02-10 RX ADMIN — PANTOPRAZOLE SODIUM 40 MG: 40 TABLET, DELAYED RELEASE ORAL at 09:40

## 2019-02-10 RX ADMIN — AMIODARONE HYDROCHLORIDE 200 MG: 200 TABLET ORAL at 09:41

## 2019-02-10 RX ADMIN — APIXABAN 2.5 MG: 2.5 TABLET, FILM COATED ORAL at 21:37

## 2019-02-10 RX ADMIN — INSULIN GLARGINE 20 UNITS: 100 INJECTION, SOLUTION SUBCUTANEOUS at 09:46

## 2019-02-10 RX ADMIN — HYDRALAZINE HYDROCHLORIDE 20 MG: 20 INJECTION INTRAMUSCULAR; INTRAVENOUS at 22:35

## 2019-02-10 RX ADMIN — INSULIN LISPRO 6 UNITS: 100 INJECTION, SOLUTION INTRAVENOUS; SUBCUTANEOUS at 17:31

## 2019-02-10 RX ADMIN — Medication 5 ML: at 21:41

## 2019-02-10 RX ADMIN — ASPIRIN 81 MG: 81 TABLET, DELAYED RELEASE ORAL at 09:40

## 2019-02-10 RX ADMIN — BUDESONIDE 500 MCG: 0.5 INHALANT RESPIRATORY (INHALATION) at 08:02

## 2019-02-10 RX ADMIN — ATORVASTATIN CALCIUM 40 MG: 40 TABLET, FILM COATED ORAL at 21:37

## 2019-02-10 RX ADMIN — METHYLPREDNISOLONE SODIUM SUCCINATE 40 MG: 40 INJECTION, POWDER, FOR SOLUTION INTRAMUSCULAR; INTRAVENOUS at 09:41

## 2019-02-10 RX ADMIN — IPRATROPIUM BROMIDE AND ALBUTEROL SULFATE 3 ML: .5; 3 SOLUTION RESPIRATORY (INHALATION) at 04:23

## 2019-02-10 RX ADMIN — Medication 5 ML: at 06:38

## 2019-02-10 RX ADMIN — APIXABAN 2.5 MG: 2.5 TABLET, FILM COATED ORAL at 09:41

## 2019-02-10 RX ADMIN — CEFTRIAXONE 1 G: 1 INJECTION, POWDER, FOR SOLUTION INTRAMUSCULAR; INTRAVENOUS at 12:35

## 2019-02-10 RX ADMIN — GUAIFENESIN 600 MG: 600 TABLET, EXTENDED RELEASE ORAL at 09:40

## 2019-02-10 RX ADMIN — CARVEDILOL 6.25 MG: 6.25 TABLET, FILM COATED ORAL at 19:56

## 2019-02-10 RX ADMIN — AZITHROMYCIN 500 MG: 250 TABLET, FILM COATED ORAL at 09:40

## 2019-02-10 RX ADMIN — BUDESONIDE 500 MCG: 0.5 INHALANT RESPIRATORY (INHALATION) at 21:40

## 2019-02-10 RX ADMIN — FERROUS SULFATE TAB 325 MG (65 MG ELEMENTAL FE) 325 MG: 325 (65 FE) TAB at 09:40

## 2019-02-10 RX ADMIN — INSULIN LISPRO 10 UNITS: 100 INJECTION, SOLUTION INTRAVENOUS; SUBCUTANEOUS at 17:32

## 2019-02-10 RX ADMIN — POTASSIUM CHLORIDE 10 MEQ: 10 TABLET, EXTENDED RELEASE ORAL at 09:40

## 2019-02-10 RX ADMIN — IPRATROPIUM BROMIDE AND ALBUTEROL SULFATE 3 ML: .5; 3 SOLUTION RESPIRATORY (INHALATION) at 21:40

## 2019-02-10 RX ADMIN — FUROSEMIDE 40 MG: 40 TABLET ORAL at 09:40

## 2019-02-10 RX ADMIN — IPRATROPIUM BROMIDE AND ALBUTEROL SULFATE 3 ML: .5; 3 SOLUTION RESPIRATORY (INHALATION) at 15:46

## 2019-02-10 NOTE — PROGRESS NOTES
Hospitalist Progress Note Subjective:  
Daily Progress Note: 2/10/2019 12:38 PM 
 
Ms. Sera Osman is an 83 yo obese white female with a pmh of WILLIAM (not on cpap), CHF, Afib on eliquis and amiodarone, CKD stage 5 (baseline Cr 3-4) s/p recent AVF placement on 1/31, DM2 who presented 2/6 with report of increasing wheeezing, cough and fever/chills for last 3-4 days. She has COPD and usually wears 2.5 L during day ( see Dr. Frantz Gay at [facility] ) she was found hypoxic at 85% in ED and was admitted for COPD flare up and possible Left arm cellulitis as there was tenderness and swelling. Placed on zosyn and vancomycin. LUE AVG evaluated by vascular surgery and graft is NOT infected and with thrill. She wears 2.5 liters O2 at baseline. 2/10:  
Pt seen at bedside Still on AirVo 45 ltrs Pt is sitting comfortably in bedside recliner. Pt reports feeling lightheaded getting up from bed, resolves in 5-10 minutes, denies any numbness/tingling/weakness in extremities No nausea/vomiting, chills, fever, chest pain, headache, diarrhea. Current Facility-Administered Medications Medication Dose Route Frequency  calcium acetate (PHOSLO) capsule 1,334 mg  2 Cap Oral TID WITH MEALS  
 [START ON 2/11/2019] insulin glargine (LANTUS) injection 30 Units  30 Units SubCUTAneous DAILY  insulin lispro (HUMALOG) injection 10 Units  10 Units SubCUTAneous TIDAC  methylPREDNISolone (PF) (SOLU-MEDROL) injection 40 mg  40 mg IntraVENous Q12H  cefTRIAXone (ROCEPHIN) 1 g in 0.9% sodium chloride (MBP/ADV) 50 mL  1 g IntraVENous Q24H  
 azithromycin (ZITHROMAX) tablet 500 mg  500 mg Oral DAILY  0.9% sodium chloride infusion 250 mL  250 mL IntraVENous PRN  
 0.9% sodium chloride infusion 250 mL  250 mL IntraVENous PRN  
 hydrALAZINE (APRESOLINE) 20 mg/mL injection 20 mg  20 mg IntraVENous Q6H PRN  
 albuterol-ipratropium (DUO-NEB) 2.5 MG-0.5 MG/3 ML  3 mL Nebulization Q4H RT  
  allopurinol (ZYLOPRIM) tablet 100 mg  100 mg Oral DAILY  amiodarone (CORDARONE) tablet 200 mg  200 mg Oral DAILY  apixaban (ELIQUIS) tablet 2.5 mg  2.5 mg Oral Q12H  aspirin delayed-release tablet 81 mg  81 mg Oral DAILY  loratadine (CLARITIN) tablet 10 mg  10 mg Oral DAILY  ferrous sulfate tablet 325 mg  325 mg Oral ACB  fluticasone (FLONASE) 50 mcg/actuation nasal spray 2 Spray  2 Spray Both Nostrils DAILY  furosemide (LASIX) tablet 40 mg  40 mg Oral DAILY  budesonide (PULMICORT) 500 mcg/2 ml nebulizer suspension  500 mcg Nebulization BID RT  
 insulin lispro (HUMALOG) injection   SubCUTAneous AC&HS  
 LORazepam (ATIVAN) tablet 0.5 mg  0.5 mg Oral TID PRN  
 montelukast (SINGULAIR) tablet 10 mg  10 mg Oral QHS  pantoprazole (PROTONIX) tablet 40 mg  40 mg Oral ACB  potassium chloride (KLOR-CON) tablet 10 mEq  10 mEq Oral DAILY  traMADol (ULTRAM) tablet 50 mg  50 mg Oral Q8H PRN  
 verapamil ER (CALAN-SR) tablet 120 mg  120 mg Oral QHS  guaiFENesin ER (MUCINEX) tablet 600 mg  600 mg Oral Q12H  
 sodium chloride (NS) flush 5-40 mL  5-40 mL IntraVENous Q8H  
 sodium chloride (NS) flush 5-40 mL  5-40 mL IntraVENous PRN  
 atorvastatin (LIPITOR) tablet 40 mg  40 mg Oral QHS Review of Systems A comprehensive review of systems was negative except for that written in the HPI. Objective:  
 
Visit Vitals /63 (BP 1 Location: Right arm, BP Patient Position: At rest;Supine) Pulse 81 Temp 97.9 °F (36.6 °C) Resp 19 Ht 5' 2\" (1.575 m) Wt 106.6 kg (235 lb) SpO2 97% BMI 42.98 kg/m² O2 Flow Rate (L/min): 45 l/min(Increased to 45 for benefits of  lung expansion) O2 Device: Heated, Hi flow nasal cannula Temp (24hrs), Av.8 °F (36.6 °C), Min:97.5 °F (36.4 °C), Max:98.1 °F (36.7 °C) 
 
 
02/10 0701 - 02/10 1900 In: -  
Out: 700 [Urine:700] 1901 - 02/10 0700 In: 386.7 Out: Nessa Murillo [EGJEI:[id number]] General: obese, awake/alert/oriented Eyes; non icteric, EOMI Neck; supple CV; RRR Pulm: coarse, diminished on right, mild expiratory wheezing present Abd; soft, non tender, active BS Skin/ext: left ABG with thrill, no erythema but mild swelling to site Additional comments:I reviewed the patient's new clinical lab test results. blood sugars, labs, chest xray Data Review Recent Results (from the past 24 hour(s)) GLUCOSE, POC Collection Time: 02/09/19  4:38 PM  
Result Value Ref Range Glucose (POC) 315 (H) 65 - 100 mg/dL GLUCOSE, POC Collection Time: 02/09/19 10:12 PM  
Result Value Ref Range Glucose (POC) 285 (H) 65 - 100 mg/dL CBC WITH AUTOMATED DIFF Collection Time: 02/10/19  5:04 AM  
Result Value Ref Range WBC 11.3 (H) 4.3 - 11.1 K/uL  
 RBC 3.37 (L) 4.05 - 5.2 M/uL HGB 9.9 (L) 11.7 - 15.4 g/dL HCT 31.2 (L) 35.8 - 46.3 % MCV 92.6 79.6 - 97.8 FL  
 MCH 29.4 26.1 - 32.9 PG  
 MCHC 31.7 31.4 - 35.0 g/dL  
 RDW 16.1 (H) 11.9 - 14.6 % PLATELET 601 769 - 911 K/uL MPV 11.6 9.4 - 12.3 FL ABSOLUTE NRBC 0.00 0.0 - 0.2 K/uL  
 DF AUTOMATED NEUTROPHILS 88 (H) 43 - 78 % LYMPHOCYTES 6 (L) 13 - 44 % MONOCYTES 4 4.0 - 12.0 % EOSINOPHILS 0 (L) 0.5 - 7.8 % BASOPHILS 0 0.0 - 2.0 % IMMATURE GRANULOCYTES 1 0.0 - 5.0 %  
 ABS. NEUTROPHILS 10.0 (H) 1.7 - 8.2 K/UL  
 ABS. LYMPHOCYTES 0.7 0.5 - 4.6 K/UL  
 ABS. MONOCYTES 0.5 0.1 - 1.3 K/UL  
 ABS. EOSINOPHILS 0.0 0.0 - 0.8 K/UL  
 ABS. BASOPHILS 0.0 0.0 - 0.2 K/UL  
 ABS. IMM. GRANS. 0.2 0.0 - 0.5 K/UL METABOLIC PANEL, COMPREHENSIVE Collection Time: 02/10/19  5:04 AM  
Result Value Ref Range Sodium 138 136 - 145 mmol/L Potassium 4.1 3.5 - 5.1 mmol/L Chloride 98 98 - 107 mmol/L  
 CO2 26 21 - 32 mmol/L Anion gap 14 mmol/L Glucose 247 (H) 65 - 100 mg/dL  (H) 8 - 23 MG/DL Creatinine 3.80 (H) 0.6 - 1.0 MG/DL  
 GFR est AA 15 (L) >60 ml/min/1.73m2 GFR est non-AA 12 ml/min/1.73m2  Calcium 8.5 8.3 - 10.4 MG/DL  
 Bilirubin, total 0.4 0.2 - 1.1 MG/DL  
 ALT (SGPT) 73 (H) 12 - 65 U/L  
 AST (SGOT) 32 15 - 37 U/L Alk. phosphatase 114 50 - 136 U/L Protein, total 6.8 g/dL Albumin 3.0 (L) 3.2 - 4.6 g/dL Globulin 3.8 (H) 2.3 - 3.5 g/dL A-G Ratio 0.8 GLUCOSE, POC Collection Time: 02/10/19  7:20 AM  
Result Value Ref Range Glucose (POC) 267 (H) 65 - 100 mg/dL GLUCOSE, POC Collection Time: 02/10/19 11:10 AM  
Result Value Ref Range Glucose (POC) 304 (H) 65 - 100 mg/dL Assessment/Plan:  
 
Principal Problem: 
Acute on chronic respiratory failure with hypoxia ([address] (2/6/2019) Multifactorial.  Continue solumedrol  q12h and duonebs q4 with bid mucinex. Continue pulmicort 
mucinex Continue IV rocephin and azithromycin S/p 2 units of PRBC, Hb 9.9 on 2/10 Will appreciate Pulmonary to evaluate if pt will need CPAP on discharge (pt has used CPAP once at home, but denies having the machine anymore) Active Problems: 
  Severe obesity ([address] (1/24/2019) Stage 4 chronic kidney disease ([address] (1/24/2019) SCr stable at this time. Started on Phoslo on 2/10. Nephrology consulted to evaluate COPD exacerbation ([address] (2/6/2019) As above Anemia (2/6/2019) Pulmonary infiltrate in right lung on chest x-ray (2/6/2019) WILLIAM (obstructive sleep apnea) (2/7/2019) Overview: Not using CPAP See Dr. Priscila Han at [facility] Uncontrolled type 2 diabetes mellitus with hyperglycemia ([facility] (2/8/2019) Increased lantus to 30 units, increased humalog from 6 to 10 units [facility] Anemia of renal disease (2/8/2019) S/p 2 units of PRBC Dispo: pending Pt is still on AirVo PT/OT eval and treat Care Plan discussed with: Patient/Family and Nurse Signed By: Ana Patel MD   
 February 10, 2019

## 2019-02-10 NOTE — PROGRESS NOTES
Problem: Mobility Impaired (Adult and Pediatric) Goal: *Acute Goals and Plan of Care (Insert Text) DISCHARGE GOALS: 
(1.)Ms. Tika Parra will move from supine to sit and sit to supine  in bed with SUPERVISION within 5 treatment day(s). (2.)Ms. Tika Parra will transfer from bed to chair and chair to bed with SUPERVISION using the least restrictive device within 5 treatment day(s). (3.)Ms. Tika Parra will ambulate with SUPERVISION for 25 feet with the least restrictive device within 5 treatment day(s). ________________________________________________________________________________________________ PHYSICAL THERAPY: Daily Note and PM 2/10/2019OBSERVATION: PT Visit Days : 2 Payor: Gabriel Eisenmenger / Plan: [address] [place] [address] HMO / Product Type: [employer] /   
  
NAME/AGE/GENDER: Aruna Ponce is a 82 y.o. female PRIMARY DIAGNOSIS: Acute on chronic respiratory failure with hypoxia ([facility] [J96.21] Acute on chronic respiratory failure with hypoxia (HCC) [J96.21] Acute on chronic respiratory failure with hypoxia (HCC) Acute on chronic respiratory failure with hypoxia (HCC) ICD-10: Treatment Diagnosis:  
 · Generalized Muscle Weakness (M62.81) · Difficulty in walking, Not elsewhere classified (R26.2) Precaution/Allergies: 
Alprazolam; Demerol [meperidine]; Levaquin [levofloxacin]; Oxycodone; Pepcid [famotidine]; Phenergan [promethazine]; Sitagliptin; and Sulfa (sulfonamide antibiotics) ASSESSMENT:  
Ms. Tika Parra presents supine in bed. States she is more tired and has had some lightheadedness this AM, but willing to transfer to BSA then recliner. Decreased assistance required with transfers today. Pt moves slowly but is steady. Left up in recliner with needs within reach. This section established at most recent assessment PROBLEM LIST (Impairments causing functional limitations): 1. Decreased Strength 2. Decreased ADL/Functional Activities 3. Decreased Transfer Abilities 4. Decreased Ambulation Ability/Technique 5. Decreased Balance 6. Decreased Activity Tolerance 7. Increased Shortness of Breath 8. Edema/Girth INTERVENTIONS PLANNED: (Benefits and precautions of physical therapy have been discussed with the patient.) 1. Balance Exercise 2. Bed Mobility 3. Family Education 4. Gait Training 5. Therapeutic Activites 6. Therapeutic Exercise/Strengthening TREATMENT PLAN: Frequency/Duration: daily for duration of hospital stay Rehabilitation Potential For Stated Goals: Good RECOMMENDED REHABILITATION/EQUIPMENT: (at time of discharge pending progress): Due to the probability of continued deficits (see above) this patient will likely need continued skilled physical therapy after discharge. Equipment:  
? None at this time HISTORY:  
History of Present Injury/Illness (Reason for Referral): 
Pt 83F with pmhx of CABG x 4, WILLIAM does not use CPAP, CHF, Afib on eliquis and amiodarone, CKD stage 5 (baseline Cr 3-4) s/p recent AVF placement on 1/31, DM2 presents with report of increasing wheeezing, cough and fever/chills for last 3-4 days. She has COPD and usually wears 2.5 L during day. Was found hypoxic at 85% on this amount. Now requiring 4lnc. Also noted to have red/warm LUE s/p recent AV graft placement CXR report RLL infiltrate. WBC 10K, hgb 7.6 (usually hgb around 8.5) 
  Hospitalist asked to admit for COPDe and LUE cellulitis. Past Medical History/Comorbidities: Ms. Mark Pang  has a past medical history of Adverse effect of anesthesia, Anemia, Anxiety, Asthma, Atrial flutter ([address] CAD (coronary artery disease) (2013), Cancer ([address] Chronic kidney disease, Chronic obstructive pulmonary disease ([address] Chronic pain, Constipation, DDD (degenerative disc disease), lumbar, Depression, Fatty liver, GERD (gastroesophageal reflux disease), Heart abnormality, Heart failure ([address] Hypercholesterolemia, Hypertension, Kidney disease, Morbid obesity ([address] Nausea & vomiting, Osteoarthritis, PUD (peptic ulcer disease), Sleep apnea, Type 2 diabetes mellitus ([facility] and Vitamin B12 deficiency. Ms. Monie Rizvi  has a past surgical history that includes hx heart catheterization (2013); pr cabg, artery-vein, four (2013); vascular surgery procedure unlist (Right); hx hysterectomy; hx gyn; hx other surgical (1992); pr neurological procedure unlisted; hx appendectomy; hx tonsillectomy; and hx colonoscopy. Social History/Living Environment:  
Home Environment: Private residence Wheelchair Ramp: Yes One/Two Story Residence: Two story(basement - lives on main level and doesn't use basement) Living Alone: No 
Support Systems: Family member(s), Spouse/Significant Other/Partner Patient Expects to be Discharged to[de-identified] Private residence Current DME Used/Available at Home: Cane, straight, Commode, bedside, Glucometer, Nebulizer, Walker, rollator, Wheelchair Tub or Shower Type: Other (comment)(walk-in tub/shower with built in seat) Prior Level of Function/Work/Activity: 
Limited ambulation-using rollator or wheelchair. Number of Personal Factors/Comorbidities that affect the Plan of Care: 1-2: MODERATE COMPLEXITY EXAMINATION:  
Most Recent Physical Functioning:  
Gross Assessment: 3-/5 throughout AROM: Within functional limits Strength: Generally decreased, functional 
Coordination: Generally decreased, functional 
         
  
 
  
Balance: 
Sitting: Intact Standing: Intact;Pull to stand; With support Bed Mobility: 
Rolling: Supervision Supine to Sit: Supervision Scooting: Supervision Transfers: 
Sit to Stand: Contact guard assistance Stand to Sit: Stand-by assistance Bed to Chair: Stand-by assistance Gait: 
  
Base of Support: Widened Speed/Kari: Slow;Shuffled Step Length: Left shortened;Right shortened Gait Abnormalities: Step to gait Distance (ft): 5 Feet (ft) Assistive Device: Walker, rolling Ambulation - Level of Assistance: Stand-by assistance Interventions: Safety awareness training;Verbal cues Body Structures Involved: 1. Muscles Body Functions Affected: 1. Movement Related Activities and Participation Affected: 1. Mobility 2. Self Care Number of elements that affect the Plan of Care: 4+: HIGH COMPLEXITY CLINICAL PRESENTATION:  
Presentation: Stable and uncomplicated: LOW COMPLEXITY CLINICAL DECISION MAKIN[address] [place] [address] AM-PAC 6 Clicks Basic Mobility Inpatient Short Form How much difficulty does the patient currently have. .. Unable A Lot A Little None 1. Turning over in bed (including adjusting bedclothes, sheets and blankets)? [] 1   [] 2   [x] 3   [] 4  
2. Sitting down on and standing up from a chair with arms ( e.g., wheelchair, bedside commode, etc.)   [] 1   [] 2   [x] 3   [] 4  
3. Moving from lying on back to sitting on the side of the bed? [] 1   [] 2   [x] 3   [] 4 How much help from another person does the patient currently need. .. Total A Lot A Little None 4. Moving to and from a bed to a chair (including a wheelchair)? [] 1   [] 2   [x] 3   [] 4  
5. Need to walk in hospital room? [] 1   [] 2   [x] 3   [] 4  
6. Climbing 3-5 steps with a railing? [] 1   [x] 2   [] 3   [] 4  
© , Trustees of [address] [place] [address], under license to [employer]. All rights reserved Score:  Initial: 17 Most Recent: X (Date: -- ) Interpretation of Tool:  Represents activities that are increasingly more difficult (i.e. Bed mobility, Transfers, Gait). Medical Necessity:    
· Patient is expected to demonstrate progress in strength, balance and coordination to increase independence with mobility and improve tolerance for activity. Reason for Services/Other Comments: 
· Patient continues to require skilled intervention due to decreased strength and mobility. Use of outcome tool(s) and clinical judgement create a POC that gives a: Clear prediction of patient's progress: LOW COMPLEXITY TREATMENT:  
(In addition to Assessment/Re-Assessment sessions the following treatments were rendered) Pre-treatment Symptoms/Complaints: was dizzy this AM 
Pain: Initial: visual scale Pain Intensity 1: 0  Post Session:  0/10 Therapeutic Activity: (  15)   : Therapeutic activities including bed mobility, transfers to BSA/recliner, sidestepping, sit to stand to improve mobility, strength, balance and coordination. Patient also ambulated with Rolling walker to chair 3 feet. Braces/Orthotics/Lines/Etc:  
· IV 
· O2 Device: Other (comment)(airvo) Treatment/Session Assessment:   
· Response to Treatment: Decreased assist required today · Interdisciplinary Collaboration:  
o Physical Therapist 
o Registered Nurse · After treatment position/precautions:  
o Up in chair 
o Bed/Chair-wheels locked 
o Bed in low position 
o Call light within reach 
o RN notified · Compliance with Program/Exercises: Compliant all of the time · Recommendations/Intent for next treatment session: \"Next visit will focus on advancements to more challenging activities and reduction in assistance provided\". Total Treatment Duration: PT Patient Time In/Time Out Time In: 2729 Time Out: 7895 Severiano Baston, PT

## 2019-02-10 NOTE — PROGRESS NOTES
Shift assessment complete. Pt alert and oriented x4. Lung sounds clear, dyspnea with exertion. High flow 02 provided. HR regular. Abdomen obese, soft with active bowel sounds heard in all four quadrants. Skin intact, trace edema noted to BLE. Arteriovenous access intact, bruit and thrill present. Pt denies pain and nausea. No needs voiced at this time. Safety measures in place. Call light within reach. Will continue to monitor.

## 2019-02-10 NOTE — PROGRESS NOTES
Resting quietly, resp labored on exertion with O2, improved at rest. Skin warm, dry. AP 84, regular. Lungs sounds clear. Assessment noted. HORACIO with thrill and bruit, incision intact with scabbed areas noted. No c/o. No distress.

## 2019-02-10 NOTE — PROGRESS NOTES
02/10/19 1135 Oxygen Therapy O2 Sat (%) 97 % O2 Device Heated; Hi flow nasal cannula O2 Flow Rate (L/min) 20 l/min FIO2 (%) 67 % FIO2 had been increased by nursing due to low Sat.

## 2019-02-10 NOTE — CONSULTS
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 Kenia Nam and was admitted 4 days ago with marked dyspnea. She has been diuresed and is breathing better, but renal function indices have worsened for which reason consult was called today. 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 She has been treated with EPO in the past, but this was placed on hold due to high co-pay expenses. She was hospitalized for pneumonia in 11/2018 which was associated with worsened renal function. Discussion of renal failure options suggested that she desired peritoneal dialysis, especially as she lives in [place] which is far away from a dialysis unit. She had an AV graft placed due to history of prior abdominal surgeries on 1/31/2019 and was admitted 4 days ago with dyspnea. Dyspnea improved with aggressive diuresis, but creatinine and BUN are increasing. No overt uremic symptoms. No bad taste in mouth, no N/V, no itching.      Past Medical History:   Diagnosis Date    Adverse effect of anesthesia     Combative    Anemia     Anxiety     Asthma     Atrial flutter (HCC)     Takes Eliquis    CAD (coronary artery disease) 2013    CABG x4    Cancer (HCC)     Uterine    Chronic kidney disease     Chronic obstructive pulmonary disease (HCC)     wears oxygen 2.5 liters continuous    Chronic pain     Lower back    Constipation     DDD (degenerative disc disease), lumbar     Depression     Fatty liver     GERD (gastroesophageal reflux disease)     Heart abnormality     Heart failure (HCC)     Hypercholesterolemia     Hypertension     Kidney disease     Morbid obesity (HCC)     Nausea & vomiting     Osteoarthritis     PUD (peptic ulcer disease)     Sleep apnea     no CPAP, wears 2 liters oxygen at bedtime    Type 2 diabetes mellitus ([facility] [address]     A1C 7.1 on 9/2018, , Low 80's,    Vitamin B12 deficiency       Past Surgical History:   Procedure Laterality Date    CABG, ARTERY-VEIN, FOUR  2013    HX APPENDECTOMY      HX COLONOSCOPY      HX GYN      ectopic pregnancy    HX HEART CATHETERIZATION  2013    HX HYSTERECTOMY      HX OTHER SURGICAL  1992    neck spurs    HX TONSILLECTOMY      NEUROLOGICAL PROCEDURE UNLISTED      diskectomy    VASCULAR SURGERY PROCEDURE UNLIST Right     right carotidendarectomy      Prior to Admission medications    Medication Sig Start Date End Date Taking? Authorizing Provider   albuterol (VENTOLIN HFA) 90 mcg/actuation inhaler Take 2 Puffs by inhalation every six (6) hours as needed for Wheezing. Provider, Historical   allopurinol (ZYLOPRIM) 100 mg tablet Take 100 mg by mouth daily. Provider, Historical   amiodarone (CORDARONE) 200 mg tablet Take 200 mg by mouth daily. Provider, Historical   apixaban (ELIQUIS) 2.5 mg tablet Take 2.5 mg by mouth two (2) times a day. Provider, Historical   aspirin delayed-release 81 mg tablet Take 81 mg by mouth daily. Provider, Historical   cetirizine (ZYRTEC) 10 mg tablet Take 10 mg by mouth daily. Provider, Historical   ferrous sulfate 325 mg (65 mg iron) tablet Take 325 mg by mouth Daily (before breakfast). Provider, Historical   fluticasone (FLONASE ALLERGY RELIEF) 50 mcg/actuation nasal spray 2 Sprays by Both Nostrils route daily. Provider, Historical   fluticasone-vilanterol (BREO ELLIPTA) 100-25 mcg/dose inhaler Take 1 Puff by inhalation daily. Provider, Historical   HYDROcodone-acetaminophen (NORCO)  mg tablet Take 1 Tab by mouth every eight (8) hours as needed for Pain.     Provider, Historical   nitroglycerin (NITROSTAT) 0.4 mg SL tablet 0.4 mg by SubLINGual route every five (5) minutes as needed for Chest Pain. Up to 3 doses. Provider, Historical   traMADol (ULTRAM) 50 mg tablet Take 50 mg by mouth every eight (8) hours as needed for Pain. Provider, Historical   umeclidinium (INCRUSE ELLIPTA) 62.5 mcg/actuation inhaler Take 1 Puff by inhalation daily. Provider, Historical   cholecalciferol (VITAMIN D3) 1,000 unit cap Take 1,000 Units by mouth daily. Provider, Historical   glimepiride (AMARYL) 1 mg tablet Take 1 mg by mouth Daily (before breakfast). Provider, Historical   albuterol (PROVENTIL VENTOLIN) 2.5 mg /3 mL (0.083 %) nebulizer solution 2.5 mg by Nebulization route every six (6) hours as needed. 3/2/15   Provider, Historical   LORazepam (ATIVAN) 1 mg tablet 0.5 mg three (3) times daily as needed for Anxiety. 3/24/15   Provider, Historical   montelukast (SINGULAIR) 10 mg tablet Take 10 mg by mouth daily. 1/8/15   Provider, Historical   furosemide (LASIX) 40 mg tablet Take 40 mg by mouth daily. Provider, Historical   verapamil SR (CALAN-SR) 240 mg CR tablet Take 120 mg by mouth nightly. Takes 1/2 tab    Provider, Historical   atorvastatin (LIPITOR) 40 mg tablet Take 40 mg by mouth daily. Provider, Historical   Omeprazole delayed release (PRILOSEC D/R) 20 mg tablet Take 20 mg by mouth daily. Provider, Historical   potassium chloride SA (MICRO-K) 10 mEq capsule Take 10 mEq by mouth daily.     Provider, Historical     Allergies   Allergen Reactions    Alprazolam Drowsiness    Demerol [Meperidine] Unknown (comments)    Levaquin [Levofloxacin] Rash    Oxycodone Unknown (comments) and Nausea and Vomiting    Pepcid [Famotidine] Nausea and Vomiting    Phenergan [Promethazine] Unknown (comments) and Other (comments)     Turns red and swells    Sitagliptin Unknown (comments)    Sulfa (Sulfonamide Antibiotics) Nausea and Vomiting      Social History     Tobacco Use    Smoking status: Former Smoker     Packs/day: 1.00     Years: 40.00 Pack years: 40.00     Last attempt to quit:      Years since quittin.1    Smokeless tobacco: Never Used   Substance Use Topics    Alcohol use: No      Family History   Problem Relation Age of Onset    Cancer Mother         breast    Diabetes Mother     Heart Disease Mother     Diabetes Father     Heart Disease Father           Review of Systems    Constitutional: no fever,   Eyes: fair vision   Ears, nose, mouth, throat, and face:fair hearing,   Respiratory: see HPI  Cardiovascular:no chest pain,    Gastrointestinal:no diarrhea,   Genitourinary: no dysuria,   Hematologic/lymphatic: no bleeding tendency,   Neurological: no seizures,    Behvioral/Psych: no psych hospitalization    Endocrine: no goiter,   The remainder is negative, except as per HPI. Objective:       Visit Vitals  /65 (BP 1 Location: Right arm, BP Patient Position: At rest)   Pulse 79   Temp 97.7 °F (36.5 °C)   Resp 18   Ht 5' 2\" (1.575 m)   Wt 106.6 kg (235 lb)   SpO2 94%   BMI 42.98 kg/m²       02/10 0701 - 02/10 1900  In: -   Out: 0162 [[id number]]  1901 - 02/10 07  In: 386.7   Out: 5050 [YHYME:0515]    General:  Alert, cooperative, mild respiratory distress, appears stated age. Head:  Normocephalic, without obvious abnormality, atraumatic. Eyes:  Conjunctivae/corneas clear. EOMs intact. Ears:  Normal external ear canals both ears. Nose: Nares normal. Septum midline. Mucosa normal. No drainage or sinus tenderness. Throat: Lips, mucosa, and tongue normal. Teeth and gums normal.   Neck: Supple, symmetrical, trachea midline, no adenopathy, thyroid: no enlargement/tenderness/nodules, no JVD. Back:   Symmetric, no curvature. ROM normal. No CVA tenderness. Lungs:   Rhonchi to auscultation bilaterally. Chest wall:  No tenderness or deformity. Heart:  Regular rate and rhythm, S1, S2 normal, no murmur,  rub or gallop. Abdomen:   Soft, non-tender. Bowel sounds normal. No masses,  No organomegaly.  No renal bruit. Extremities: Extremities normal, atraumatic, no cyanosis, trace to + edema. Access: LLA graft is open and examines well   Skin: Skin color, texture, turgor normal. No rashes or lesions. Lymph nodes: Cervical and supraclavicular nodes normal.   Neurologic: Grossly intact. No asterixis. Data Review:     Results for Ahsan Akhtar (MRN [id number]) as of 2/10/2019 17:53   Ref. Range 2/7/2019 05:56 2/8/2019 05:40 2/9/2019 02:33 2/10/2019 05:04   Sodium Latest Ref Range: 136 - 145 mmol/L 140 139 136 138   Potassium Latest Ref Range: 3.5 - 5.1 mmol/L 4.4 4.8 4.7 4.1   Chloride Latest Ref Range: 98 - 107 mmol/L 103 102 99 98   CO2 Latest Ref Range: 21 - 32 mmol/L 26 26 25 26   Anion gap Latest Units: mmol/L 11 11 12 14   Glucose Latest Ref Range: 65 - 100 mg/dL 236 (H) 316 (H) 264 (H) 247 (H)   BUN Latest Ref Range: 8 - 23 MG/DL 66 (H) 82 (H) 89 (H) 105 (H)   Creatinine Latest Ref Range: 0.6 - 1.0 MG/DL 3.30 (H) 3.84 (H) 3.87 (H) 3.80 (H)   Calcium Latest Ref Range: 8.3 - 10.4 MG/DL 8.3 8.3 8.6 8.5   Phosphorus Latest Ref Range: 2.3 - 3.7 MG/DL  5.3 (H)     Magnesium Latest Ref Range: 1.8 - 2.4 mg/dL   2.8 (H)    GFR est non-AA Latest Units: ml/min/1.73m2 14 12 12 12   GFR est AA Latest Ref Range: >60 ml/min/1.73m2 17 (L) 14 (L) 14 (L) 15 (L)   Bilirubin, total Latest Ref Range: 0.2 - 1.1 MG/DL  0.3 0.4 0.4   Protein, total Latest Units: g/dL  6.5 7.0 6.8   Albumin Latest Ref Range: 3.2 - 4.6 g/dL  2.8 (L) 3.1 (L) 3.0 (L)   Globulin Latest Ref Range: 2.3 - 3.5 g/dL  3.7 (H) 3.9 (H) 3.8 (H)   A-G Ratio Latest Units:    0.8 0.8 0.8   ALT (SGPT) Latest Ref Range: 12 - 65 U/L  98 (H) 89 (H) 73 (H)   AST Latest Ref Range: 15 - 37 U/L  92 (H) 44 (H) 32   Alk.  phosphatase Latest Ref Range: 50 - 136 U/L  126 127 114       Recent Results (from the past 24 hour(s))   GLUCOSE, POC    Collection Time: 02/09/19 10:12 PM   Result Value Ref Range    Glucose (POC) 285 (H) 65 - 100 mg/dL   CBC WITH AUTOMATED DIFF Collection Time: 02/10/19  5:04 AM   Result Value Ref Range    WBC 11.3 (H) 4.3 - 11.1 K/uL    RBC 3.37 (L) 4.05 - 5.2 M/uL    HGB 9.9 (L) 11.7 - 15.4 g/dL    HCT 31.2 (L) 35.8 - 46.3 %    MCV 92.6 79.6 - 97.8 FL    MCH 29.4 26.1 - 32.9 PG    MCHC 31.7 31.4 - 35.0 g/dL    RDW 16.1 (H) 11.9 - 14.6 %    PLATELET 996 998 - 958 K/uL    MPV 11.6 9.4 - 12.3 FL    ABSOLUTE NRBC 0.00 0.0 - 0.2 K/uL    DF AUTOMATED      NEUTROPHILS 88 (H) 43 - 78 %    LYMPHOCYTES 6 (L) 13 - 44 %    MONOCYTES 4 4.0 - 12.0 %    EOSINOPHILS 0 (L) 0.5 - 7.8 %    BASOPHILS 0 0.0 - 2.0 %    IMMATURE GRANULOCYTES 1 0.0 - 5.0 %    ABS. NEUTROPHILS 10.0 (H) 1.7 - 8.2 K/UL    ABS. LYMPHOCYTES 0.7 0.5 - 4.6 K/UL    ABS. MONOCYTES 0.5 0.1 - 1.3 K/UL    ABS. EOSINOPHILS 0.0 0.0 - 0.8 K/UL    ABS. BASOPHILS 0.0 0.0 - 0.2 K/UL    ABS. IMM. GRANS. 0.2 0.0 - 0.5 K/UL   METABOLIC PANEL, COMPREHENSIVE    Collection Time: 02/10/19  5:04 AM   Result Value Ref Range    Sodium 138 136 - 145 mmol/L    Potassium 4.1 3.5 - 5.1 mmol/L    Chloride 98 98 - 107 mmol/L    CO2 26 21 - 32 mmol/L    Anion gap 14 mmol/L    Glucose 247 (H) 65 - 100 mg/dL     (H) 8 - 23 MG/DL    Creatinine 3.80 (H) 0.6 - 1.0 MG/DL    GFR est AA 15 (L) >60 ml/min/1.73m2    GFR est non-AA 12 ml/min/1.73m2    Calcium 8.5 8.3 - 10.4 MG/DL    Bilirubin, total 0.4 0.2 - 1.1 MG/DL    ALT (SGPT) 73 (H) 12 - 65 U/L    AST (SGOT) 32 15 - 37 U/L    Alk.  phosphatase 114 50 - 136 U/L    Protein, total 6.8 g/dL    Albumin 3.0 (L) 3.2 - 4.6 g/dL    Globulin 3.8 (H) 2.3 - 3.5 g/dL    A-G Ratio 0.8     GLUCOSE, POC    Collection Time: 02/10/19  7:20 AM   Result Value Ref Range    Glucose (POC) 267 (H) 65 - 100 mg/dL   GLUCOSE, POC    Collection Time: 02/10/19 11:10 AM   Result Value Ref Range    Glucose (POC) 304 (H) 65 - 100 mg/dL   GLUCOSE, POC    Collection Time: 02/10/19  4:38 PM   Result Value Ref Range    Glucose (POC) 294 (H) 65 - 100 mg/dL     Results for Laurent Wen (MRN [id number]) as of 2/10/2019 17:56   Ref. Range 2/6/2019 10:18   Iron Latest Units: ug/dL 13   TIBC Latest Ref Range: 250 - 450 ug/dL 292   Transferrin Saturation Latest Units: % 4   Transferrin Latest Ref Range: 202 - 364 mg/dL 226   Ferritin Latest Ref Range: 8 - 388 NG/ML 94       CXR viewed by me - some fluid excess and CM    IMPRESSION:  Borderline cardiomegaly with some atelectasis or infiltrate right base. Principal Problem:    Acute on chronic respiratory failure with hypoxia ([facility] [address] (2/6/2019)    Active Problems:    Severe obesity ([address] (1/24/2019)      Stage 4 chronic kidney disease ([facility] [address] (1/24/2019)      Cellulitis (2/6/2019)      COPD exacerbation (HCC) (2/6/2019)      Anemia (2/6/2019)      Pulmonary infiltrate in right lung on chest x-ray (2/6/2019)      WILLIAM (obstructive sleep apnea) (2/7/2019)      Overview: Not using CPAP      See Dr. Shan Vieira at [facility]      Uncontrolled type 2 diabetes mellitus with hyperglycemia ([facility] [address] (2/8/2019)      Anemia of renal disease (2/8/2019)        Assessment:     1. CKD stage 4 to 5 with ELBA   - CKD due to likely underlying HTN/vascular disease  - ELBA due to CHF/diuresis   - BUN is increased out of proportion also due to steroids  - no urgent dialysis needed    2. CHF/fluid overload -  - not sure if increased demand from supporting AV graft may be contributing to decompensation  - obtain ECHO  - continue Lasix diuresis for now at reduced dose  - switch to Carvedilol and stop Diltiazem, d/w Dr. Esparza Cons    3. Anemia with iron deficiency -  - held epo shots at the request of the patient d/t expense, but she required transfusion in the past  - treat with IV iron  - no transfusion needed at this time    4. malnutrition -  - add supplements    5. HTN -  - switch off Diltiazem to Carvedilol due to negative inotrope effect of Ca++blocker  - monitor closely for adverse respiratory effect from carvedilol    6. secondary hyperparathyroidism -  - continue Vit D and calcitriol     7.  COPD   - continue current respiratory treatments  - management per pulmonary service    8. Pulmonary infiltrate -  - fluid overload is more likely in my opinion than pneumonia    9. DM II -  - fair control    10. Hyperphosphatemia -  - on calcium acetate for phosphorus binder    11.  Elevated LFTs -  - improving with control of fluid overload  - suggestive of passive liver congestion from CHF      Plan:     As above - critically ill - greater than one hour    Tae Reid M.D.

## 2019-02-10 NOTE — PROGRESS NOTES
02/10/19 1136 Oxygen Therapy O2 Sat (%) 97 % O2 Device Heated; Hi flow nasal cannula O2 Flow Rate (L/min) 45 l/min (Increased to 45 for benefits of  lung expansion) FIO2 (%) 45 % (decreased to 45%)

## 2019-02-11 LAB
BACTERIA SPEC CULT: NORMAL
BACTERIA SPEC CULT: NORMAL
GLUCOSE BLD STRIP.AUTO-MCNC: 139 MG/DL (ref 65–100)
GLUCOSE BLD STRIP.AUTO-MCNC: 139 MG/DL (ref 65–100)
GLUCOSE BLD STRIP.AUTO-MCNC: 187 MG/DL (ref 65–100)
GLUCOSE BLD STRIP.AUTO-MCNC: 296 MG/DL (ref 65–100)
SERVICE CMNT-IMP: NORMAL
SERVICE CMNT-IMP: NORMAL

## 2019-02-11 PROCEDURE — 74011250637 HC RX REV CODE- 250/637: Performed by: INTERNAL MEDICINE

## 2019-02-11 PROCEDURE — 94760 N-INVAS EAR/PLS OXIMETRY 1: CPT

## 2019-02-11 PROCEDURE — 97535 SELF CARE MNGMENT TRAINING: CPT

## 2019-02-11 PROCEDURE — 77010033711 HC HIGH FLOW OXYGEN

## 2019-02-11 PROCEDURE — 74011000250 HC RX REV CODE- 250: Performed by: INTERNAL MEDICINE

## 2019-02-11 PROCEDURE — 74011636637 HC RX REV CODE- 636/637: Performed by: INTERNAL MEDICINE

## 2019-02-11 PROCEDURE — 65660000000 HC RM CCU STEPDOWN

## 2019-02-11 PROCEDURE — 74011636637 HC RX REV CODE- 636/637: Performed by: HOSPITALIST

## 2019-02-11 PROCEDURE — 74011250636 HC RX REV CODE- 250/636: Performed by: FAMILY MEDICINE

## 2019-02-11 PROCEDURE — C8929 TTE W OR WO FOL WCON,DOPPLER: HCPCS

## 2019-02-11 PROCEDURE — 74011250636 HC RX REV CODE- 250/636: Performed by: HOSPITALIST

## 2019-02-11 PROCEDURE — 99232 SBSQ HOSP IP/OBS MODERATE 35: CPT | Performed by: INTERNAL MEDICINE

## 2019-02-11 PROCEDURE — 77030021668 HC NEB PREFIL KT VYRM -A

## 2019-02-11 PROCEDURE — 99218 HC RM OBSERVATION: CPT

## 2019-02-11 PROCEDURE — 74011250637 HC RX REV CODE- 250/637: Performed by: HOSPITALIST

## 2019-02-11 PROCEDURE — 94640 AIRWAY INHALATION TREATMENT: CPT

## 2019-02-11 PROCEDURE — 97530 THERAPEUTIC ACTIVITIES: CPT

## 2019-02-11 PROCEDURE — 74011250636 HC RX REV CODE- 250/636: Performed by: INTERNAL MEDICINE

## 2019-02-11 PROCEDURE — 82962 GLUCOSE BLOOD TEST: CPT

## 2019-02-11 RX ORDER — LANOLIN ALCOHOL/MO/W.PET/CERES
1 CREAM (GRAM) TOPICAL
Status: DISCONTINUED | OUTPATIENT
Start: 2019-02-11 | End: 2019-02-13 | Stop reason: HOSPADM

## 2019-02-11 RX ORDER — POLYETHYLENE GLYCOL 3350 17 G/17G
17 POWDER, FOR SOLUTION ORAL
Status: DISCONTINUED | OUTPATIENT
Start: 2019-02-11 | End: 2019-02-13 | Stop reason: HOSPADM

## 2019-02-11 RX ORDER — IPRATROPIUM BROMIDE AND ALBUTEROL SULFATE 2.5; .5 MG/3ML; MG/3ML
3 SOLUTION RESPIRATORY (INHALATION)
Status: DISCONTINUED | OUTPATIENT
Start: 2019-02-11 | End: 2019-02-13 | Stop reason: HOSPADM

## 2019-02-11 RX ORDER — IPRATROPIUM BROMIDE AND ALBUTEROL SULFATE 2.5; .5 MG/3ML; MG/3ML
3 SOLUTION RESPIRATORY (INHALATION)
Status: DISCONTINUED | OUTPATIENT
Start: 2019-02-11 | End: 2019-02-11

## 2019-02-11 RX ADMIN — ALLOPURINOL 100 MG: 100 TABLET ORAL at 08:14

## 2019-02-11 RX ADMIN — INSULIN LISPRO 2 UNITS: 100 INJECTION, SOLUTION INTRAVENOUS; SUBCUTANEOUS at 22:08

## 2019-02-11 RX ADMIN — CALCIUM ACETATE 1334 MG: 667 CAPSULE ORAL at 08:13

## 2019-02-11 RX ADMIN — METHYLPREDNISOLONE SODIUM SUCCINATE 40 MG: 40 INJECTION, POWDER, FOR SOLUTION INTRAMUSCULAR; INTRAVENOUS at 08:20

## 2019-02-11 RX ADMIN — POTASSIUM CHLORIDE 10 MEQ: 10 TABLET, EXTENDED RELEASE ORAL at 08:13

## 2019-02-11 RX ADMIN — ATORVASTATIN CALCIUM 40 MG: 40 TABLET, FILM COATED ORAL at 22:02

## 2019-02-11 RX ADMIN — PERFLUTREN 1 ML: 6.52 INJECTION, SUSPENSION INTRAVENOUS at 10:00

## 2019-02-11 RX ADMIN — Medication 5 ML: at 22:04

## 2019-02-11 RX ADMIN — FUROSEMIDE 40 MG: 40 TABLET ORAL at 08:14

## 2019-02-11 RX ADMIN — CALCIUM ACETATE 1334 MG: 667 CAPSULE ORAL at 16:10

## 2019-02-11 RX ADMIN — IPRATROPIUM BROMIDE AND ALBUTEROL SULFATE 3 ML: .5; 3 SOLUTION RESPIRATORY (INHALATION) at 21:27

## 2019-02-11 RX ADMIN — BUDESONIDE 500 MCG: 0.5 INHALANT RESPIRATORY (INHALATION) at 21:27

## 2019-02-11 RX ADMIN — GUAIFENESIN 600 MG: 600 TABLET, EXTENDED RELEASE ORAL at 20:43

## 2019-02-11 RX ADMIN — FERROUS SULFATE TAB 325 MG (65 MG ELEMENTAL FE) 325 MG: 325 (65 FE) TAB at 20:43

## 2019-02-11 RX ADMIN — AZITHROMYCIN 500 MG: 250 TABLET, FILM COATED ORAL at 08:12

## 2019-02-11 RX ADMIN — APIXABAN 2.5 MG: 2.5 TABLET, FILM COATED ORAL at 08:13

## 2019-02-11 RX ADMIN — INSULIN GLARGINE 30 UNITS: 100 INJECTION, SOLUTION SUBCUTANEOUS at 08:11

## 2019-02-11 RX ADMIN — HYDRALAZINE HYDROCHLORIDE 20 MG: 20 INJECTION INTRAMUSCULAR; INTRAVENOUS at 16:11

## 2019-02-11 RX ADMIN — CARVEDILOL 6.25 MG: 6.25 TABLET, FILM COATED ORAL at 08:13

## 2019-02-11 RX ADMIN — APIXABAN 2.5 MG: 2.5 TABLET, FILM COATED ORAL at 20:43

## 2019-02-11 RX ADMIN — IPRATROPIUM BROMIDE AND ALBUTEROL SULFATE 3 ML: .5; 3 SOLUTION RESPIRATORY (INHALATION) at 13:00

## 2019-02-11 RX ADMIN — CARVEDILOL 6.25 MG: 6.25 TABLET, FILM COATED ORAL at 16:10

## 2019-02-11 RX ADMIN — INSULIN LISPRO 6 UNITS: 100 INJECTION, SOLUTION INTRAVENOUS; SUBCUTANEOUS at 08:11

## 2019-02-11 RX ADMIN — PANTOPRAZOLE SODIUM 40 MG: 40 TABLET, DELAYED RELEASE ORAL at 08:12

## 2019-02-11 RX ADMIN — GUAIFENESIN 600 MG: 600 TABLET, EXTENDED RELEASE ORAL at 08:12

## 2019-02-11 RX ADMIN — BUDESONIDE 500 MCG: 0.5 INHALANT RESPIRATORY (INHALATION) at 08:20

## 2019-02-11 RX ADMIN — INSULIN LISPRO 10 UNITS: 100 INJECTION, SOLUTION INTRAVENOUS; SUBCUTANEOUS at 16:11

## 2019-02-11 RX ADMIN — ASPIRIN 81 MG: 81 TABLET, DELAYED RELEASE ORAL at 08:12

## 2019-02-11 RX ADMIN — LORATADINE 10 MG: 10 TABLET ORAL at 08:12

## 2019-02-11 RX ADMIN — IPRATROPIUM BROMIDE AND ALBUTEROL SULFATE 3 ML: .5; 3 SOLUTION RESPIRATORY (INHALATION) at 04:24

## 2019-02-11 RX ADMIN — MONTELUKAST SODIUM 10 MG: 10 TABLET, FILM COATED ORAL at 20:43

## 2019-02-11 RX ADMIN — METHYLPREDNISOLONE SODIUM SUCCINATE 40 MG: 40 INJECTION, POWDER, FOR SOLUTION INTRAMUSCULAR; INTRAVENOUS at 20:43

## 2019-02-11 RX ADMIN — FLUTICASONE PROPIONATE 2 SPRAY: 50 SPRAY, METERED NASAL at 08:23

## 2019-02-11 RX ADMIN — INSULIN LISPRO 10 UNITS: 100 INJECTION, SOLUTION INTRAVENOUS; SUBCUTANEOUS at 12:09

## 2019-02-11 RX ADMIN — IPRATROPIUM BROMIDE AND ALBUTEROL SULFATE 3 ML: .5; 3 SOLUTION RESPIRATORY (INHALATION) at 08:20

## 2019-02-11 RX ADMIN — IPRATROPIUM BROMIDE AND ALBUTEROL SULFATE 3 ML: .5; 3 SOLUTION RESPIRATORY (INHALATION) at 00:08

## 2019-02-11 RX ADMIN — INSULIN LISPRO 10 UNITS: 100 INJECTION, SOLUTION INTRAVENOUS; SUBCUTANEOUS at 08:10

## 2019-02-11 RX ADMIN — AMIODARONE HYDROCHLORIDE 200 MG: 200 TABLET ORAL at 08:14

## 2019-02-11 NOTE — PROGRESS NOTES
Hospitalist Progress Note Subjective:  
Daily Progress Note: 2/11/2019 12:38 PM 
 
Ms. Sera Osman is an 81 yo obese white female with a pmh of WILLIAM (not on cpap), CHF, Afib on eliquis and amiodarone, CKD stage 5 (baseline Cr 3-4) s/p recent AVF placement on 1/31, DM2 who presented 2/6 with report of increasing wheeezing, cough and fever/chills for last 3-4 days. She has COPD and usually wears 2.5 L during day ( see Dr[facility] Frantz Gay at [facility] ) she was found hypoxic at 85% in ED and was admitted for COPD flare up and possible Left arm cellulitis as there was tenderness and swelling. Placed on zosyn and vancomycin. LUE AVG evaluated by vascular surgery and graft is NOT infected and with thrill. She wears 2.5 liters O2 at baseline. 2/11:  
Pt seen at bedside Still on AirVo 45 ltrs Compaints of constipation, no Cough, has SOB, No nausea/vomiting, chills, fever, chest pain, headache, diarrhea. Current Facility-Administered Medications Medication Dose Route Frequency  calcium acetate (PHOSLO) capsule 1,334 mg  2 Cap Oral TID WITH MEALS  insulin glargine (LANTUS) injection 30 Units  30 Units SubCUTAneous DAILY  insulin lispro (HUMALOG) injection 10 Units  10 Units SubCUTAneous TIDAC  carvedilol (COREG) tablet 6.25 mg  6.25 mg Oral BID WITH MEALS  ferric gluconate (FERRLECIT) 250 mg in 0.9% sodium chloride 100 mL IVPB  250 mg IntraVENous DAILY  methylPREDNISolone (PF) (SOLU-MEDROL) injection 40 mg  40 mg IntraVENous Q12H  
 azithromycin (ZITHROMAX) tablet 500 mg  500 mg Oral DAILY  0.9% sodium chloride infusion 250 mL  250 mL IntraVENous PRN  
 0.9% sodium chloride infusion 250 mL  250 mL IntraVENous PRN  
 hydrALAZINE (APRESOLINE) 20 mg/mL injection 20 mg  20 mg IntraVENous Q6H PRN  
 albuterol-ipratropium (DUO-NEB) 2.5 MG-0.5 MG/3 ML  3 mL Nebulization Q4H RT  
 allopurinol (ZYLOPRIM) tablet 100 mg  100 mg Oral DAILY  amiodarone (CORDARONE) tablet 200 mg  200 mg Oral DAILY  apixaban (ELIQUIS) tablet 2.5 mg  2.5 mg Oral Q12H  aspirin delayed-release tablet 81 mg  81 mg Oral DAILY  loratadine (CLARITIN) tablet 10 mg  10 mg Oral DAILY  fluticasone (FLONASE) 50 mcg/actuation nasal spray 2 Spray  2 Spray Both Nostrils DAILY  furosemide (LASIX) tablet 40 mg  40 mg Oral DAILY  budesonide (PULMICORT) 500 mcg/2 ml nebulizer suspension  500 mcg Nebulization BID RT  
 insulin lispro (HUMALOG) injection   SubCUTAneous AC&HS  
 LORazepam (ATIVAN) tablet 0.5 mg  0.5 mg Oral TID PRN  
 montelukast (SINGULAIR) tablet 10 mg  10 mg Oral QHS  pantoprazole (PROTONIX) tablet 40 mg  40 mg Oral ACB  potassium chloride (KLOR-CON) tablet 10 mEq  10 mEq Oral DAILY  traMADol (ULTRAM) tablet 50 mg  50 mg Oral Q8H PRN  
 guaiFENesin ER (MUCINEX) tablet 600 mg  600 mg Oral Q12H  
 sodium chloride (NS) flush 5-40 mL  5-40 mL IntraVENous Q8H  
 sodium chloride (NS) flush 5-40 mL  5-40 mL IntraVENous PRN  
 atorvastatin (LIPITOR) tablet 40 mg  40 mg Oral QHS Review of Systems A comprehensive review of systems was negative except for that written in the HPI. Objective:  
 
Visit Vitals /54 (BP 1 Location: Right arm, BP Patient Position: At rest) Pulse 77 Temp 98 °F (36.7 °C) Resp 24 Ht 5' 2\" (1.575 m) Wt 109 kg (240 lb 3.2 oz) SpO2 92% Comment: airvo, after transfer sit to supine BMI 43.93 kg/m² O2 Flow Rate (L/min): 45 l/min O2 Device: Other (comment)(airvo) Temp (24hrs), Av.7 °F (36.5 °C), Min:97.4 °F (36.3 °C), Max:98 °F (36.7 °C) No intake/output data recorded.  1901 -  0700 In: -  
Out: 3350 [[id number]:0556] General: obese, awake/alert/oriented Eyes; non icteric, EOMI Neck; supple CV; RRR Pulm: coarse, diminished on right, mild expiratory wheezing present Abd; soft, non tender, active BS Additional comments:I reviewed the patient's new clinical lab test results. blood sugars, labs, chest xray Data Review Recent Results (from the past 24 hour(s)) GLUCOSE, POC Collection Time: 02/10/19  4:38 PM  
Result Value Ref Range Glucose (POC) 294 (H) 65 - 100 mg/dL GLUCOSE, POC Collection Time: 02/10/19  9:09 PM  
Result Value Ref Range Glucose (POC) 197 (H) 65 - 100 mg/dL GLUCOSE, POC Collection Time: 02/11/19  7:15 AM  
Result Value Ref Range Glucose (POC) 296 (H) 65 - 100 mg/dL Assessment/Plan:  
 
Principal Problem: 
Acute on chronic respiratory failure with hypoxia ([address] (2/6/2019) Multifactorial.  Continue solumedrol  q12h and duonebs q4 with bid mucinex. Continue pulmicort 
mucinex Continue IV rocephin and azithromycin S/p 2 units of PRBC, Hb 9.9 on 2/10 Pulm following Active Problems: 
  Severe obesity ([facility] [address] (1/24/2019) Stage 4 chronic kidney disease ([facility] [address] (1/24/2019) SCr stable at this time. Started on Phoslo on 2/10. Nephrology consulted to evaluate COPD exacerbation (HCC) (2/6/2019)/  
 Pulmonary infiltrate in right lung on chest x-ray (2/6/2019) As above WILLIAM (obstructive sleep apnea) (2/7/2019) Overview: Not using CPAP Sees Dr. Francheska Saeed at [facility] Uncontrolled type 2 diabetes mellitus with hyperglycemia ([facility] [address] (2/8/2019) Increased lantus to 30 units, increased humalog from 6 to 10 units TIDAC 
FSG better today monitor Anemia of renal disease (2/8/2019) S/p 2 units of PRBC, check labs in AM 
 
Dispo: pending Pt is still on AirVo PT/OT eval and treat Care Plan discussed with: Patient/Family and Nurse Signed By: Christine Swain MD   
 February 11, 2019

## 2019-02-11 NOTE — PROGRESS NOTES
Resting quietly, awake, resp labored on exertion, improved at rest with O2 via airvo, at 45L, 45%, AP 80, regular, lungs sounds clear. HORACIO with good thrill and bruitt, incision intact with scabbed areas noted. Assessment noted. No c/o. No distress.

## 2019-02-11 NOTE — PROGRESS NOTES
Resting quietly, awake, discussing pt's need for laxative from Md during bedside report given to Joe Schaffer RN, to address.

## 2019-02-11 NOTE — PROGRESS NOTES
Johan Clark Admission Date: 2/6/2019 Daily Progress Note: 2/11/2019 Patient is a 82 y.o.  female seen and evaluated at the request of Dr. Erika Beebe for the evaluation of respiratory failure. The patient is obese female with history of WILLIAM ,not complaint with CPAP, COPD, chronic respiratory failure on 2.5L NC,. A fib on Eliquis, CKD stage 5 s/p recent AVF in 1/2019 presented with few says history of increased SOB, wheezing, cough and fever and was admitted for COPD exacerbation and L arm cellulitis. She is on Rocephin and Zithromax She is followed by [facility] at [facility] She feels only little better On airvo 45 % Subjective: No events overnight- net negative 2200cc in last 24h and -ve 9L since admission Current Facility-Administered Medications Medication Dose Route Frequency  calcium acetate (PHOSLO) capsule 1,334 mg  2 Cap Oral TID WITH MEALS  insulin glargine (LANTUS) injection 30 Units  30 Units SubCUTAneous DAILY  insulin lispro (HUMALOG) injection 10 Units  10 Units SubCUTAneous TIDAC  carvedilol (COREG) tablet 6.25 mg  6.25 mg Oral BID WITH MEALS  ferric gluconate (FERRLECIT) 250 mg in 0.9% sodium chloride 100 mL IVPB  250 mg IntraVENous DAILY  methylPREDNISolone (PF) (SOLU-MEDROL) injection 40 mg  40 mg IntraVENous Q12H  cefTRIAXone (ROCEPHIN) 1 g in 0.9% sodium chloride (MBP/ADV) 50 mL  1 g IntraVENous Q24H  
 azithromycin (ZITHROMAX) tablet 500 mg  500 mg Oral DAILY  0.9% sodium chloride infusion 250 mL  250 mL IntraVENous PRN  
 0.9% sodium chloride infusion 250 mL  250 mL IntraVENous PRN  
 hydrALAZINE (APRESOLINE) 20 mg/mL injection 20 mg  20 mg IntraVENous Q6H PRN  
 albuterol-ipratropium (DUO-NEB) 2.5 MG-0.5 MG/3 ML  3 mL Nebulization Q4H RT  
 allopurinol (ZYLOPRIM) tablet 100 mg  100 mg Oral DAILY  amiodarone (CORDARONE) tablet 200 mg  200 mg Oral DAILY  apixaban (ELIQUIS) tablet 2.5 mg  2.5 mg Oral Q12H  aspirin delayed-release tablet 81 mg  81 mg Oral DAILY  loratadine (CLARITIN) tablet 10 mg  10 mg Oral DAILY  fluticasone (FLONASE) 50 mcg/actuation nasal spray 2 Spray  2 Spray Both Nostrils DAILY  furosemide (LASIX) tablet 40 mg  40 mg Oral DAILY  budesonide (PULMICORT) 500 mcg/2 ml nebulizer suspension  500 mcg Nebulization BID RT  
 insulin lispro (HUMALOG) injection   SubCUTAneous AC&HS  
 LORazepam (ATIVAN) tablet 0.5 mg  0.5 mg Oral TID PRN  
 montelukast (SINGULAIR) tablet 10 mg  10 mg Oral QHS  pantoprazole (PROTONIX) tablet 40 mg  40 mg Oral ACB  potassium chloride (KLOR-CON) tablet 10 mEq  10 mEq Oral DAILY  traMADol (ULTRAM) tablet 50 mg  50 mg Oral Q8H PRN  
 guaiFENesin ER (MUCINEX) tablet 600 mg  600 mg Oral Q12H  
 sodium chloride (NS) flush 5-40 mL  5-40 mL IntraVENous Q8H  
 sodium chloride (NS) flush 5-40 mL  5-40 mL IntraVENous PRN  
 atorvastatin (LIPITOR) tablet 40 mg  40 mg Oral QHS Objective:  
 
Vitals:  
 02/11/19 0301 02/11/19 0430 02/11/19 3146 02/11/19 0820 BP: 153/65  168/54 Pulse: 71  77 Resp: 20  24 Temp: 97.7 °F (36.5 °C)  98 °F (36.7 °C) SpO2: 92% 94% 93% 98% Weight:      
Height:      
 
Intake and Output:  
02/09 1901 - 02/11 0700 In: -  
Out: 3350 [GCURJ:0099] No intake/output data recorded. Physical Exam:         
GEN: well developed and in no acute distress, Oxygen per Bobbe Ego HEENT:  PERRL, EOMI, no alar flaring or epistaxis, oral mucosa moist without cyanosis, NECK:  no JVD, no retractions, no thyromegaly or masses, LUNGS:  CTA HEART:  RRR with no M,G,R; 
ABDOMEN:  soft with no tenderness; positive bowel sounds present EXTREMITIES:  warm with no cyanosis, 1+ lower leg edema SKIN:  no jaundice or ecchymosis NEURO:  alert and oriented, grossly non-focal 
 
CHEST XRAY:  
 
LAB Recent Labs  
  02/10/19 
0504 02/09/19 
6059 WBC 11.3* 12.0* HGB 9.9* 10.1* HCT 31.2* 31.7*  265 Recent Labs 02/10/19 
3851 02/09/19 
7352  136  
K 4.1 4.7 CL 98 99 CO2 26 25 * 264* * 89* CREA 3.80* 3.87* MG  --  2.8* No results for input(s): PH, PCO2, PO2, HCO3 in the last 72 hours. No results for input(s): LCAD, LAC in the last 72 hours. Assessment:  
 
Patient Active Problem List  
Diagnosis Code  Hydronephrosis N13.30  
 Incomplete bladder emptying R33.9  Mixed incontinence urge and stress (male)(female) N39.46  
 Acquired cyst of kidney N28.1  Severe obesity (HCC) E66.01  
 Stage 4 chronic kidney disease ([facility] [address] N18.4  Acute on chronic respiratory failure with hypoxia (HCC) J96.21  
 Cellulitis L03.90  
 COPD exacerbation (HCC) J44.1  Anemia D64.9  
 Pulmonary infiltrate in right lung on chest x-ray R91.8  WILLIAM (obstructive sleep apnea) G47.33  
 Uncontrolled type 2 diabetes mellitus with hyperglycemia (HCC) E11.65  Anemia of renal disease D63.1 Plan Hospital Problems  Date Reviewed: 1/31/2019 Codes Class Noted POA Uncontrolled type 2 diabetes mellitus with hyperglycemia (HCC) ICD-10-CM: E11.65 ICD-9-CM: 250.02  2/8/2019 Yes Anemia of renal disease ICD-10-CM: D63.1 ICD-9-CM: 285.21  2/8/2019 Yes WILLIAM (obstructive sleep apnea) ICD-10-CM: G47.33 
ICD-9-CM: 327.23  2/7/2019 Yes Overview Signed 2/7/2019 10:49 AM by Faustino Gomez MD  
  Not using CPAP See Dr. Beatriz Ngo at [facility] * (Principal) Acute on chronic respiratory failure with hypoxia (HCC) ICD-10-CM: J96.21 
ICD-9-CM: 518.84, 799.02  2/6/2019 Yes Volume OL doing well with diuresis Cellulitis ICD-10-CM: L03.90 ICD-9-CM: 682.9  2/6/2019 Yes Per primary COPD exacerbation ([facility] [address] ICD-10-CM: J44.1 ICD-9-CM: 491.21  2/6/2019 Yes Better ccRx Anemia ICD-10-CM: D64.9 ICD-9-CM: 285.9  2/6/2019 Yes Pulmonary infiltrate in right lung on chest x-ray ICD-10-CM: R91.8 ICD-9-CM: 793.19  2/6/2019 Yes On zithromax and rocephine- can stop rocephin and  Continue zithromax po for a total of 7 days Severe obesity ([facility] [address] ICD-10-CM: E66.01 
ICD-9-CM: 278.01  1/24/2019 Yes Stage 4 chronic kidney disease ([address] ICD-10-CM: N18.4 ICD-9-CM: 585.4  1/24/2019 Yes Per nephrology More than 50% of time documented was spent in face-to-face contact with the patient and in the care of the patient on the floor/unit where the patient is located.   
 
 
 
 
 
Nehal Bolivar MD

## 2019-02-11 NOTE — PROGRESS NOTES
Problem: Nutrition Deficit Goal: *Optimize nutritional status Nutrition Reason for assessment: Length of stay Assessment:  
Food/Nutrition Patient History:  Pt admitted with acute/chronic respiratory failure, COPD exac, stage 4 CKD, CHF/fluid overload. PMH remarkable for MO, COPD, CKD 4-5 recent AV graft, DM. Pt c/o poor appetite and asks why Nepro was delivered to her. She reports her appetite has been low and eating less than baseline. She has not consumed any Nepro at this time as she was afraid it would increase her blood sugar. Diet order(s): Diet: DIET DIABETIC CONSISTENT CARB Regular DIET NUTRITIONAL SUPPLEMENTS All Meals, HS Snack; Nepro   Anthropometrics:Height: 5' 2\" (157.5 cm),  Weight: 109 kg (240 lb 3.2 oz), Weight Source: Standing scale (comment), Body mass index is 43.93 kg/m². BMI class of morbid obesity class III. Macronutrient needs: EER:  0934-7521 kcal /day (30-35 kcal/kg ideal BW) EPR:  1-1.2 grams protein/day (50-60grams/kg ideal BW) Intake/Comparative Standards: Per RD meal rounds patient is potentially consuming ~50% of meal(s). This potentially meets ~60% of kcal and ~55% of protein needs based on patient reported selections from menus. Nutrition Diagnosis: Inadequate oral intake related to poor appetite as evidenced by pt report of barrier, po meeting <60% estimated needs. Intervention: 
Meals and snacks: Continue current diet. Nutrition Supplement Therapy: Continue Nepro change to TID. Discussed with pt amount to consume based on the amount of food she consumes at meal service. Coordination of Nutrition Care: Stevan Rinne Assistant. Interdisciplinary rounds. Discharge Nutrition Plan: Too soon to determine. OpAvelino Pearson [address]

## 2019-02-11 NOTE — PROGRESS NOTES
RENAL PROGRESS NOTE Subjective:  
 
 
Cc - shortness of breath 83 y/o [address] with chronic kidney disease stage 4-5 in the setting of HTN followed at the office for about 4 years had recent AV graft placement by Dr. Neva Hines and was admitted 4 days ago with marked dyspnea. She has been diuresed and is breathing better, but renal function indices have worsened for which reason consult was called today. 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 She has been treated with EPO in the past, but this was placed on hold due to high co-pay expenses. She was hospitalized for pneumonia in 11/2018 which was associated with worsened renal function. Discussion of renal failure options suggested that she desired peritoneal dialysis, especially as she lives in [place] which is far away from a dialysis unit. She had an AV graft placed due to history of prior abdominal surgeries on 1/31/2019 and was admitted 4 days ago with dyspnea. Dyspnea improved with aggressive diuresis, but creatinine and BUN are increasing. No overt uremic symptoms. No bad taste in mouth, no N/V, no itching. 2/11/19 - feels a little better today - declines IV iron as in the past she had vomiting with IV iron - wishes to continue PO iron - finally having a BM - wondering about CPAP which she had trouble with in the past - no overt uremic symptoms Past Medical History:  
Diagnosis Date  Adverse effect of anesthesia Combative  Anemia  Anxiety  Asthma  Atrial flutter ([address] Takes Eliquis  CAD (coronary artery disease) 2013 CABG x4  
 Cancer ([facility] [address] Uterine  Chronic kidney disease  Chronic obstructive pulmonary disease (HCC)   
 wears oxygen 2.5 liters continuous  Chronic pain Lower back  Constipation  DDD (degenerative disc disease), lumbar  Depression  Fatty liver  GERD (gastroesophageal reflux disease)  Heart abnormality  Heart failure ([facility] [address]  Hypercholesterolemia  Hypertension  Kidney disease  Morbid obesity ([facility] [address]  Nausea & vomiting  Osteoarthritis  PUD (peptic ulcer disease)  Sleep apnea   
 no CPAP, wears 2 liters oxygen at bedtime  Type 2 diabetes mellitus ([facility] [address] A1C 7.1 on 9/2018, , Low 80's,  Vitamin B12 deficiency Past Surgical History:  
Procedure Laterality Date  CABG, ARTERY-VEIN, FOUR  2013  HX APPENDECTOMY  HX COLONOSCOPY    
 HX GYN    
 ectopic pregnancy [address] CATHETERIZATION  2013  HX HYSTERECTOMY  HX OTHER SURGICAL  1992  
 neck spurs  HX TONSILLECTOMY  NEUROLOGICAL PROCEDURE UNLISTED    
 diskectomy  VASCULAR SURGERY PROCEDURE UNLIST Right   
 right carotidendarectomy Prior to Admission medications Medication Sig Start Date End Date Taking? Authorizing Provider  
albuterol (VENTOLIN HFA) 90 mcg/actuation inhaler Take 2 Puffs by inhalation every six (6) hours as needed for Wheezing. Provider, Historical  
allopurinol (ZYLOPRIM) 100 mg tablet Take 100 mg by mouth daily. Provider, Historical  
amiodarone (CORDARONE) 200 mg tablet Take 200 mg by mouth daily. Provider, Historical  
apixaban (ELIQUIS) 2.5 mg tablet Take 2.5 mg by mouth two (2) times a day. Provider, Historical  
aspirin delayed-release 81 mg tablet Take 81 mg by mouth daily. Provider, Historical  
cetirizine (ZYRTEC) 10 mg tablet Take 10 mg by mouth daily. Provider, Historical  
ferrous sulfate 325 mg (65 mg iron) tablet Take 325 mg by mouth Daily (before breakfast). Provider, Historical  
fluticasone (FLONASE ALLERGY RELIEF) 50 mcg/actuation nasal spray 2 Sprays by Both Nostrils route daily.     Provider, Historical  
fluticasone-vilanterol (BREO ELLIPTA) 100-25 mcg/dose inhaler Take 1 Puff by inhalation daily. Provider, Historical  
HYDROcodone-acetaminophen (NORCO)  mg tablet Take 1 Tab by mouth every eight (8) hours as needed for Pain. Provider, Historical  
nitroglycerin (NITROSTAT) 0.4 mg SL tablet 0.4 mg by SubLINGual route every five (5) minutes as needed for Chest Pain. Up to 3 doses. Provider, Historical  
traMADol (ULTRAM) 50 mg tablet Take 50 mg by mouth every eight (8) hours as needed for Pain. Provider, Historical  
umeclidinium (INCRUSE ELLIPTA) 62.5 mcg/actuation inhaler Take 1 Puff by inhalation daily. Provider, Historical  
cholecalciferol (VITAMIN D3) 1,000 unit cap Take 1,000 Units by mouth daily. Provider, Historical  
glimepiride (AMARYL) 1 mg tablet Take 1 mg by mouth Daily (before breakfast). Provider, Historical  
albuterol (PROVENTIL VENTOLIN) 2.5 mg /3 mL (0.083 %) nebulizer solution 2.5 mg by Nebulization route every six (6) hours as needed. 3/2/15   Provider, Historical  
LORazepam (ATIVAN) 1 mg tablet 0.5 mg three (3) times daily as needed for Anxiety. 3/24/15   Provider, Historical  
montelukast (SINGULAIR) 10 mg tablet Take 10 mg by mouth daily. 1/8/15   Provider, Historical  
furosemide (LASIX) 40 mg tablet Take 40 mg by mouth daily. Provider, Historical  
verapamil SR (CALAN-SR) 240 mg CR tablet Take 120 mg by mouth nightly. Takes 1/2 tab    Provider, Historical  
atorvastatin (LIPITOR) 40 mg tablet Take 40 mg by mouth daily. Provider, Historical  
Omeprazole delayed release (PRILOSEC D/R) 20 mg tablet Take 20 mg by mouth daily. Provider, Historical  
potassium chloride SA (MICRO-K) 10 mEq capsule Take 10 mEq by mouth daily. Provider, Historical  
 
Allergies Allergen Reactions  Alprazolam Drowsiness  Demerol [Meperidine] Unknown (comments)  Levaquin [Levofloxacin] Rash  Oxycodone Unknown (comments) and Nausea and Vomiting  Pepcid [Famotidine] Nausea and Vomiting  Phenergan [Promethazine] Unknown (comments) and Other (comments) Turns red and swells  Sitagliptin Unknown (comments)  Sulfa (Sulfonamide Antibiotics) Nausea and Vomiting Social History Tobacco Use  Smoking status: Former Smoker Packs/day: 1.00 Years: 40.00 Pack years: 40.00 Last attempt to quit:  Years since quittin.1  Smokeless tobacco: Never Used Substance Use Topics  Alcohol use: No  
  
Family History Problem Relation Age of Onset  Cancer Mother   
     breast  
 Diabetes Mother  Heart Disease Mother  Diabetes Father  Heart Disease Father Review of Systems Constitutional: no fever, Eyes: fair vision Ears, nose, mouth, throat, and face:fair hearing, Respiratory: see HPI Cardiovascular:no chest pain,   
Gastrointestinal:no diarrhea,  
Genitourinary: no dysuria, Hematologic/lymphatic: no bleeding tendency, Neurological: no seizures, Behvioral/Psych: no psych hospitalization Endocrine: no goiter, The remainder is negative, except as per HPI. Objective:  
 
 
Visit Vitals /82 (BP 1 Location: Right arm, BP Patient Position: At rest) Pulse 75 Temp 97.3 °F (36.3 °C) Resp 20 Ht 5' 2\" (1.575 m) Wt 109 kg (240 lb 3.2 oz) SpO2 95% BMI 43.93 kg/m²  
 
 
701 - 1900 In: -  
Out: 850 [Urine:850] 1901 -  07 In: -  
Out: 3350 [[id number]] General:  Alert, cooperative, mild to moderate respiratory distress on nasal O2, appears stated age. Head:  Normocephalic, without obvious abnormality, atraumatic. Eyes:  Conjunctivae/corneas clear. EOMs intact. Throat: Lips, mucosa, and tongue normal. Teeth and gums normal.  
Neck: Supple, symmetrical, trachea midline, no adenopathy,  no JVD. Lungs:   Rhonchi to auscultation bilaterally. Heart:  Regular rate and rhythm, S1, S2 normal, no murmur,  rub or gallop. Abdomen:   Soft, non-tender. No masses,  No organomegaly. Extremities: Extremities normal, atraumatic, no cyanosis, trace to + edema. Access: LLA graft is open and examines well Skin: Skin color, texture, turgor normal. No rashes or lesions. Neurologic: Grossly intact. No asterixis. Data Review:  
 
Results for Beth Suazo (MRN [id number]) as of 2/10/2019 17:53 Ref. Range 2/7/2019 05:56 2/8/2019 05:40 2/9/2019 02:33 2/10/2019 05:04 Sodium Latest Ref Range: 136 - 145 mmol/L 140 139 136 138 Potassium Latest Ref Range: 3.5 - 5.1 mmol/L 4.4 4.8 4.7 4.1 Chloride Latest Ref Range: 98 - 107 mmol/L 103 102 99 98  
CO2 Latest Ref Range: 21 - 32 mmol/L 26 26 25 26 Anion gap Latest Units: mmol/L 11 11 12 14 Glucose Latest Ref Range: 65 - 100 mg/dL 236 (H) 316 (H) 264 (H) 247 (H) BUN Latest Ref Range: 8 - 23 MG/DL 66 (H) 82 (H) 89 (H) 105 (H) Creatinine Latest Ref Range: 0.6 - 1.0 MG/DL 3.30 (H) 3.84 (H) 3.87 (H) 3.80 (H) Calcium Latest Ref Range: 8.3 - 10.4 MG/DL 8.3 8.3 8.6 8.5 Phosphorus Latest Ref Range: 2.3 - 3.7 MG/DL  5.3 (H) Magnesium Latest Ref Range: 1.8 - 2.4 mg/dL   2.8 (H) GFR est non-AA Latest Units: ml/min/1.73m2 14 12 12 12 GFR est AA Latest Ref Range: >60 ml/min/1.73m2 17 (L) 14 (L) 14 (L) 15 (L) Bilirubin, total Latest Ref Range: 0.2 - 1.1 MG/DL  0.3 0.4 0.4 Protein, total Latest Units: g/dL  6.5 7.0 6.8 Albumin Latest Ref Range: 3.2 - 4.6 g/dL  2.8 (L) 3.1 (L) 3.0 (L) Globulin Latest Ref Range: 2.3 - 3.5 g/dL  3.7 (H) 3.9 (H) 3.8 (H) A-G Ratio Latest Units:    0.8 0.8 0.8 ALT (SGPT) Latest Ref Range: 12 - 65 U/L  98 (H) 89 (H) 73 (H) AST Latest Ref Range: 15 - 37 U/L  92 (H) 44 (H) 32 Alk. phosphatase Latest Ref Range: 50 - 136 U/L  126 127 114 Recent Results (from the past 24 hour(s)) GLUCOSE, POC Collection Time: 02/10/19  9:09 PM  
Result Value Ref Range Glucose (POC) 197 (H) 65 - 100 mg/dL GLUCOSE, POC  
 Collection Time: 02/11/19  7:15 AM  
Result Value Ref Range Glucose (POC) 296 (H) 65 - 100 mg/dL GLUCOSE, POC Collection Time: 02/11/19 11:53 AM  
Result Value Ref Range Glucose (POC) 139 (H) 65 - 100 mg/dL GLUCOSE, POC Collection Time: 02/11/19  3:24 PM  
Result Value Ref Range Glucose (POC) 139 (H) 65 - 100 mg/dL Results for Bijal Lisa (MRN [id number]) as of 2/10/2019 17:56 Ref. Range 2/6/2019 10:18 Iron Latest Units: ug/dL 13 TIBC Latest Ref Range: 250 - 450 ug/dL 292 Transferrin Saturation Latest Units: % 4 Transferrin Latest Ref Range: 202 - 364 mg/dL 226 Ferritin Latest Ref Range: 8 - 388 NG/ML 94 CXR viewed by me - some fluid excess and CM IMPRESSION:  Borderline cardiomegaly with some atelectasis or infiltrate right base. ECHO 2/11/19 - SUMMARY: 
-  Left ventricle: Size was at the upper limits of normal. Systolic function 
was normal. Ejection fraction was estimated in the range of 55 % to 60 %. Therewere no regional wall motion abnormalities. There was mild concentric 
hypertrophy. The E/e' ratio was 28.5. There was diastolic dysfunction. -  Left atrium: The atrium was mildly dilated. -  Right atrium: The atrium was mildly dilated. -  Inferior vena cava, hepatic veins: The inferior vena cava was mildly 
dilated. The respirophasic change in diameter was more than 50%. -  Mitral valve: There was mild annular calcification. There was mild to 
moderate regurgitation. -  Tricuspid valve: There was mild regurgitation. Principal Problem: 
  Acute on chronic respiratory failure with hypoxia ([address] (2/6/2019) Active Problems: 
  Severe obesity ([address] (1/24/2019) Stage 4 chronic kidney disease ([address] (1/24/2019) Cellulitis (2/6/2019) COPD exacerbation ([address] (2/6/2019) Anemia (2/6/2019) Pulmonary infiltrate in right lung on chest x-ray (2/6/2019) WILLIAM (obstructive sleep apnea) (2/7/2019)     Overview: Not using CPAP 
 See Dr. Fela Hoffmann at [address] Uncontrolled type 2 diabetes mellitus with hyperglycemia ([facility] [address] (2/8/2019) Anemia of renal disease (2/8/2019) Assessment: 1. CKD stage 4 to 5 with ELBA  
- CKD due to likely underlying HTN/vascular disease - ELBA due to CHF/diuresis  
- BUN is increased out of proportion also due to steroids 
- no urgent dialysis needed 2. CHF/fluid overload - 
- not sure if increased demand from supporting AV graft may be contributing to decompensation 
- ECHO shows adequate EF, mild/mod TR 
- continue Lasix diuresis for now at reduced dose 
- monitor response to switch to Carvedilol and stopping Diltiazem 3. Anemia with iron deficiency - 
- held epo shots at the request of the patient d/t expense, but she required transfusion in the past 
- she declines IV iron, treat with intensified PO iron 
- no transfusion needed at this time 4. malnutrition - 
- add supplements 5. HTN - 
- switch off Diltiazem to Carvedilol due to negative inotrope effect of Ca++blocker 
- monitor closely for adverse respiratory effect from carvedilol 6. secondary hyperparathyroidism - 
- continue Vit D and calcitriol 7. COPD  
- continue current respiratory treatments 
- management per pulmonary service 8. Pulmonary infiltrate - 
- fluid overload is more likely in my opinion than pneumonia 9. DM II - 
- fair control 10. Hyperphosphatemia - 
- on calcium acetate for phosphorus binder 11. Elevated LFTs - 
- improving with control of fluid overload 
- suggestive of passive liver congestion from CHF, possibly from TR per ECHO report Plan: As above - critically ill - greater than one hour Piero Gutierrez M.D.

## 2019-02-11 NOTE — PROGRESS NOTES
Problem: Self Care Deficits Care Plan (Adult) Goal: *Acute Goals and Plan of Care (Insert Text) 1. Patient will perform grooming with SBA. 2. Patient will perform Upper body bathing with min assist. 
3 Patient will perform upper and lower body bathing with supervision. 5. Patient will perform toilet transfers with CGA/SBA. progressing 6. Patient will perform shower transfer with CGA/SBA. 7. Patient will participate in 30 + minutes of ADL/ therapeutic exercise/therapeutic activity with mod rest breaks to increase activity tolerance for self care. 8. Patient will perform ADL functional mobility in room with CGA/SBA. Goals to be achieved in 7 days. OCCUPATIONAL THERAPY: Daily Note and AM 2/11/2019INPATIENT: OT Visit Days: 1 Payor: [employer] / Plan: [address] [place] [address] HMO / Product Type: Managed Care Medicare /  
 NO BP  R UE 
NAME/AGE/GENDER: Bautista Burrell is a 82 y.o. female PRIMARY DIAGNOSIS:  Acute on chronic respiratory failure with hypoxia ([address] [J96.21] Acute on chronic respiratory failure with hypoxia ([address] [J96.21] COPD exacerbation (HCC) [J44.1] Acute on chronic respiratory failure with hypoxia (HCC) Acute on chronic respiratory failure with hypoxia (HCC) ICD-10: Treatment Diagnosis:  
 · Generalized Muscle Weakness (M62.81) · Other lack of cordination (R27.8) Precautions/Allergies: 
   Alprazolam; Demerol [meperidine]; Levaquin [levofloxacin]; Oxycodone; Pepcid [famotidine]; Phenergan [promethazine]; Sitagliptin; and Sulfa (sulfonamide antibiotics) ASSESSMENT:  
Ms. Uche Scott presents siting up in the recliner, wanting to use the [facility]. She is on Airvo and presents with the above diagnosis. She has limited R shoulder AROM due to old fracture. She has a new AV fistula in her L forearm edema present. SHe had assistance from family prior to admit with dressing and bathing.  Patient transferred with CGA and performed front hygiene and clothing management with CGA. She needed assistance with rear hygiene. SHe transferred back to recliner and washed her hands and set up her own lunch tray. She plans to go back to her home with family support. She would benefit from skilled OT services. Will follow. 2/11/19 1015am patient received sitting on EOB, s/p toileting from [facility]. She is SOB and frustrated that she can no have a good Bm. Appears more SOB then on Friday. She rested then stood and took a step to get higher in the bed with SBA. She was CGA  Sit to sidelying Right to get her LE onto the bed. She washed her hands and was positioned in bed. O2 sat was 92% on airvo. Her edema in L UE looks less than Friday. She was cooperative and funny during session. All needs in reach and bed alarm on . Will continue with POC. This section established at most recent assessment PROBLEM LIST (Impairments causing functional limitations): 1. Decreased Strength 2. Decreased ADL/Functional Activities 3. Decreased Transfer Abilities 4. Decreased Ambulation Ability/Technique 5. Decreased Balance 6. Decreased Activity Tolerance 7. Decreased Pacing Skills 8. Decreased Work Simplification/Energy Conservation Techniques 9. Increased Shortness of Breath 10. Decreased Flexibility/Joint Mobility INTERVENTIONS PLANNED: (Benefits and precautions of occupational therapy have been discussed with the patient.) 1. Activities of daily living training 2. Adaptive equipment training 3. Balance training 4. Clothing management 5. Donning&doffing training 6. Neuromuscular re-eduation 7. Therapeutic activity 8. Therapeutic exercise TREATMENT PLAN: Frequency/Duration: Follow patient 3 times to address above goals. Rehabilitation Potential For Stated Goals: Good RECOMMENDED REHABILITATION/EQUIPMENT: (at time of discharge pending progress): Due to the probability of continued deficits (see above) this patient will likely need continued skilled occupational therapy after discharge. Equipment:  
? None at this time OCCUPATIONAL PROFILE AND HISTORY:  
History of Present Injury/Illness (Reason for Referral): 
See H and P Past Medical History/Comorbidities: Ms. Irasema Rodriguez  has a past medical history of Adverse effect of anesthesia, Anemia, Anxiety, Asthma, Atrial flutter ([address] CAD (coronary artery disease) (2013), Cancer ([address] Chronic kidney disease, Chronic obstructive pulmonary disease ([address] Chronic pain, Constipation, DDD (degenerative disc disease), lumbar, Depression, Fatty liver, GERD (gastroesophageal reflux disease), Heart abnormality, Heart failure ([address] Hypercholesterolemia, Hypertension, Kidney disease, Morbid obesity ([address] Nausea & vomiting, Osteoarthritis, PUD (peptic ulcer disease), Sleep apnea, Type 2 diabetes mellitus ([address] and Vitamin B12 deficiency. Ms. Irasema Rodriguez  has a past surgical history that includes hx heart catheterization (2013); pr cabg, artery-vein, four (2013); vascular surgery procedure unlist (Right); hx hysterectomy; hx gyn; hx other surgical (1992); pr neurological procedure unlisted; hx appendectomy; hx tonsillectomy; and hx colonoscopy. Social History/Living Environment:  
Home Environment: Private residence Wheelchair Ramp: Yes One/Two Story Residence: Two story(basement - lives on main level and doesn't use basement) Living Alone: No 
Support Systems: Family member(s), Spouse/Significant Other/Partner Patient Expects to be Discharged to[de-identified] Private residence Current DME Used/Available at Home: Cane, straight, Commode, bedside, Glucometer, Nebulizer, Walker, rollator, Wheelchair Tub or Shower Type: Other (comment)(walk-in tub/shower with built in seat) Prior Level of Function/Work/Activity: 
Assist with ADls from family, mod I with rollator Number of Personal Factors/Comorbidities that affect the Plan of Care: Brief history (0):  LOW COMPLEXITY ASSESSMENT OF OCCUPATIONAL PERFORMANCE[de-identified]  
Activities of Daily Living:  
Basic ADLs (From Assessment) Complex ADLs (From Assessment) Feeding: Supervision(set up her own tray) Oral Facial Hygiene/Grooming: Supervision(washed her hands) Bathing: Moderate assistance Upper Body Dressing: Moderate assistance Lower Body Dressing: Moderate assistance, Maximum assistance Toileting: Minimum assistance(CGA manage clothing standing, did front hygien) Grooming/Bathing/Dressing Activities of Daily Living Cognitive Retraining Safety/Judgement: Awareness of environment; Fall prevention Bed/Mat Mobility Sit to Supine: Contact guard assistance Sit to Stand: Stand-by assistance Most Recent Physical Functioning:  
Gross Assessment: 
  
         
  
Posture: 
Posture (WDL): Exceptions to [facility] Posture Assessment: Forward head, Rounded shoulders Balance: 
Sitting: Intact Standing: Without support Bed Mobility: 
Sit to Supine: Contact guard assistance Wheelchair Mobility: 
  
Transfers: 
Sit to Stand: Stand-by assistance Patient Vitals for the past 6 hrs: 
 BP BP Patient Position SpO2 O2 Flow Rate (L/min) Pulse 02/11/19 0812 168/54 At rest 93 %  77  
02/11/19 0820   98 % 45 l/min   
02/11/19 1015   92 %   Mental Status Neurologic State: Alert, Appropriate for age Orientation Level: Appropriate for age Cognition: Appropriate decision making, Appropriate for age attention/concentration, Appropriate safety awareness, Follows commands Perception: Appears intact Perseveration: No perseveration noted Safety/Judgement: Awareness of environment, Fall prevention Physical Skills Involved: 1. Range of Motion 2. Balance 3. Strength 4. Activity Tolerance Cognitive Skills Affected (resulting in the inability to perform in a timely and safe manner): 1. none Psychosocial Skills Affected: 1. none Number of elements that affect the Plan of Care: 3-5:  MODERATE COMPLEXITY CLINICAL DECISION MAKING:  
[facility] AM-PAC 6 Clicks Daily Activity Inpatient Short Form How much help from another person does the patient currently need. .. Total A Lot A Little None 1. Putting on and taking off regular lower body clothing? [] 1   [x] 2   [] 3   [] 4  
2. Bathing (including washing, rinsing, drying)? [] 1   [x] 2   [] 3   [] 4  
3. Toileting, which includes using toilet, bedpan or urinal?   [] 1   [] 2   [x] 3   [] 4  
4. Putting on and taking off regular upper body clothing? [] 1   [] 2   [x] 3   [] 4  
5. Taking care of personal grooming such as brushing teeth? [] 1   [] 2   [x] 3   [] 4  
6. Eating meals? [] 1   [] 2   [] 3   [x] 4  
© 2007, Trustees of [facility] [employer], under license to [employer]. All rights reserved Score:  Initial: 17 Most Recent: X (Date: -- ) Interpretation of Tool:  Represents activities that are increasingly more difficult (i.e. Bed mobility, Transfers, Gait). Medical Necessity:    
· Patient is expected to demonstrate progress in balance, coordination and functional technique to decrease assistance required with self care and functional mobiltiy and improve safety during self care and functional mobiltiy. Reason for Services/Other Comments: 
· Patient continues to require skilled intervention due to decreased self care and functional mobiltiy. Use of outcome tool(s) and clinical judgement create a POC that gives a: LOW COMPLEXITY  
 
 
 
TREATMENT:  
(In addition to Assessment/Re-Assessment sessions the following treatments were rendered) Pre-treatment Symptoms/Complaints:   
Pain: Initial:  
Pain Intensity 1: 0  Post Session:  0/10 Self Care: (10): Procedure(s) (per grid) utilized to improve and/or restore self-care/home management as related to grooming and bed mobility and transfers. Required minimal visual, verbal and tactile cueing to facilitate activities of daily living skills, compensatory activities and effective bed mobility techniques sidelying first. 
 
Braces/Orthotics/Lines/Etc:  
· O2 Device: Heated, Hi flow nasal cannula, Humidifier Treatment/Session Assessment:   
· Response to Treatment:   
· Interdisciplinary Collaboration:  
o Occupational Therapist 
o Registered Nurse 
o Certified Nursing Assistant/Patient Care Technician · After treatment position/precautions:  
o Supine in bed 
o Bed alarm/tab alert on 
o Bed/Chair-wheels locked 
o Bed in low position 
o Call light within reach 
o RN notified 
o Side rails x 3  
· Compliance with Program/Exercises: Compliant all of the time. · Recommendations/Intent for next treatment session: \"Next visit will focus on advancements to more challenging activities and reduction in assistance provided\". Total Treatment Duration:10 
OT Patient Time In/Time Out Time In: 1681 Time Out: 1025 Rosa Aragon OT

## 2019-02-11 NOTE — PROGRESS NOTES
PT deferred therapy activity due to pt not available for therapy activity. PT will follow up as indicated.  Espinoza Rainey, PT, GEORGINA

## 2019-02-11 NOTE — PROGRESS NOTES
Respiratory Care Services Policy Number: [id number] Title: Patient Care Assessment Program 
 
Effective Date: 01/1999 Revised Date: 05/2014 Reviewed Date: 06/2013/ 03/2015, 03/2016, 06/2017 Overview In an effort to improve quality and reduce costs of respiratory care at [facility], the Respiratory Department has developed a number of Patient Care Protocols. These protocols have been developed according to Ac 3 and are utilized for those patients who are ordered respiratory therapy using therapeutic indications and standardized approaches for accomplishing objectives. Patient Care Protocols are intended to improve care by: ? Defining the indications and standards of care agreed upon by the Pulmonary Medicine and [address] [place] [address] of [facility]. ? Training respiratory care practitioners to apply those criteria to individual patients and modify therapy as indicated by the protocols. ? Documenting the indication and care plan as part of the initial ordering process. ? Tapering or discontinuing treatments once the indication for therapy changes. The Patient Care Protocols shall be universally applied throughout the hospital as determined by  
the Pulmonary Medicine and [address] [place] [address]. Rationale for Patient Care Assessment Protocols: 
? Continuous Quality Improvement ? Cost containment ? Standardization of care 
? Enhanced continuity of care ? Utilization review ? Timely intervention The following patient care assessment protocols have been developed: ? Aerosolized Medication ? Bronchial Hygiene ? Oxygen Weaning Protocol ? Volume Expansion/Secretion Clearance Non-Vented ? Ventilator Weaning Protocol ? CVRU Fast Track Weaning Protocol ? Asthma Treatment Protocol ER ? Pediatric Asthma Treatment Protocol ER ? Alpha-1 Antitrypsin Deficiency Protocol ? Prone Positioning Protocol The Director of [address] [place] [address] oversees the Patient Care Assessment Program. The Clinical/ is responsible for protocol development and training. The Supervisor is responsible for implementation and  activities. Each patient with an order for respiratory treatments will receive an evaluation. All Registered Respiratory Care Practitioners (RCPs) will perform the evaluations. The same evaluation tool will be utilized for all initial and follow-up assessments. If the patient does not meet criteria for ordered therapy, the therapy will be discontinued. If the patient demonstrates an adverse response to initially ordered therapy, the therapy will be discontinued and the physician will be contacted. Specific physician's orders that deviate from protocols and are deemed \"inappropriate\" or \"unsafe\" will be addressed with ordering physician and/or medical director as required. Patients of [facility] [address] will not be assessed for the first 24 hours as the Medical Director of [address] [place] [address] has requested that changes in therapy should not be made during that time frame. Respiratory Patient Care Assessment Protocols I.  Policy: In an effort to provide quality patient care and effective utilization of services, physicians who order respiratory therapy will have their patients treated via the protocols established (see attached). All Registered [address] (RCPs) will complete the initial assessment. This assessment will indicate patient needs, and the care plan developed for the patient and will performed within 24 hours of admission. Frequency of the therapy will be set according to the results of the respiratory therapy evaluation and frequency guidelines policy.  Reassessment will be continued done every 48 hours and more frequently as needed for the individual patient. II. Purpose: A. To provide a process that will allow for ongoing assessment and care plan modification for patients receiving respiratory services based on both objective and or subjective patient responses to interventions. This process of protocol utilization will assist in patient care progression while eliminating the need for the physician to continually update respiratory therapy orders. B. To assure continuity of respiratory care that meets [facility] Clinical Practice Guidelines. III. Initiation:  Implement Respiratory Care Protocols for patients who are ordered by physician  
       to receive respiratory therapy procedures or for ventilator management. IV. Protocol: A. Upon receiving an order for therapy the RCP will review the patients EMR (electronic medical record) for all pertinent information includin. Physicians order for therapy 2. Patient history and physical examination 3. Physician progress notes 4. Diagnostic. X-rays, PFTs, arterial blood gases etc. 
 
 
B. The RCP will perform a respiratory assessment in the following manner: 1. Perform hand hygiene per hospital policy utilizing Standard Precautions for all patients and following transmission-based isolation as indicated per policy. 2. Identify patient via ID bracelet verifying patient name and birth date. 3. General observations: color, pattern and effort of breathing, chest expansion, (symmetrical and bilateral), level of consciousness and the ability to ambulate. 4. The RCP will assess patients cough ability and determine if Nasotracheal suctioning is needed. If patient is unable to produce sputum, at that time, the RCP should question the patient with regard to their sputum: production, color consistency, frequency and amount.  
5. Auscultation: Using a stethoscope, the RCP will listen and note quality of breath sounds and presence or absence of adventitious breath sounds in all lung fields, both anteriorly and posteriorly. 6. Upon completing the EMR review and physical assessment, the RCP will document findings in the RT Assessment section of the EMR. The score level will be provided and will be used to determine the frequency of therapy. V. Indications: A. Indications for Bronchial Hygiene Protocol will include: 1. Potential for or presence of atelectasis. 2. Need for hydration and removal of retained secretions. 3. Need for improvement of cough effectiveness. 4. Presence of conditions associated with disorder of pulmonary clearance: 
a. Cystic fibrosis b. Bronchiectasis B. Indications for Aerosolized Medication(s) Protocol should include: 1. Treatment of bronchospasm/wheezing 2. Improvement of mucociliary clearance 3. Treatment of stridor 4. History of Asthma or COPD 
           C.  Indications for Oxygen Therapy Protocol should include: 1. Documented hypoxemia 2. Severe trauma 3. Acute myocardial infarction 4. Short-term therapy (e.g. post anesthesia recovery) VI. Maintenance: A. Timely patient assessment is an integral part of this protocol therefore the following will be applied: 1. All non- critical care patients will be evaluated upon receiving initial respiratory care orders within 24 hours and re-evaluated within 48 hours (or more as needed). 2. Orders requesting a Respiratory Consult will be responded to in the following manner: 
a. In patient emergency situations, the RCP assigned to the floor will respond immediately to the patient, provide an initial respiratory assessment, and contact the patients physician as necessary for appropriate orders. b. In non-emergent situations, the RCP assigned to the floor will respond to the patient within 90 minutes and provide an initial respiratory assessment and contact patients physician as necessary for appropriate orders. c. An RCP will provide a comprehensive assessment as soon as possible. 3. Upon completion of an evaluation, the RCP will complete documentation in the patients EMR in the RT Assessment section. 4. The RCP who completes the assessment will document orders for therapy in the orders section of the patients EMR selecting new order. Next, per protocol should be selected indicating it is a protocol order and sign orders should be selected to complete the process. 5. The Pharmacy and Therapeutics (P&T) Committee has mandated that the medication Xopenex may be changed to unit dose albuterol without an order, except for those patients receiving Xopenex due to cardiac arrhythmias. The dosage for these patients should be 0.63 mg. and may be changed from 1.25 mg. to 0.63 mg per P & T Committee by the RCP completing the assessment. 6. Patients who are not experiencing cardiac arrhythmias, and are ordered Xopenex and Atrovent may be changed to Duoneb. VII. Safety: A. The following safety issues shall be monitored: 1. The RCP will perform hand hygiene per hospital policy utilizing Standard Precautions for all patients and following transmission-based isolation as indicated per hospital policy. 2. The RCP must exercise professional judgment in classifying the patient for frequency of therapy. 3. Appropriate classification of the patient will require an evaluation utilizing the Therapy Assessment Protocol Guidelines. 4. The RCP will confer with the physician concerning the care of the patient at any time questions or problems arise. 5. If during therapy, the patient exhibits no improvement or deterioration in clinical status the RCP will notify the physician and the patients nurse. VIII. Interventions: A. The patients nurse is responsible concerning all items related to his/her care. Ongoing communication with nursing is essential to successful protocol management. B. The RCP recognizes the value of the team approach in meeting the patients needs. Nursing input regarding the patients pulmonary condition will be sought as needed. IX. Reportable conditions: The RCP will inform the physician if: 1. There are acute changes in patients respiratory status. 2. The therapist is unable to determine appropriate care plan upon assessment. 3. The patient fails to reach therapeutic objective. 4. A change or additional medication is needed. X.  Patient Education: A. Patient will receive instruction on the followin. The treatment modality, including objectives and proper technique of therapy 2. Respiratory medications B. Documentation shall occur in the patient education section of the patients EMR. XI. Documentation: Record all findings as described above in the patients EMR. Related Protocols: A. Aerosolized Medication Protocol B. Bronchial Hygiene C. Oxygen Protocol D. Volume Expansion/Secretion Clearance E. Ventilator Weaning Protocols References: 
[address] Standard L    Respiratory Care Department Policy, Procedure and Protocol Guideline Manual, , GAL Cummins. L  Therapist Driven Respiratory Care Protocols  A Practitioners Guide for Criteria-Based Respiratory Care by Larry Andres M.D., and GAL Emery, MORALES. L  The rationale for therapist-driven protocols: an update. Respiratory Care 1998; 02:400-782 N   [facility] Clinical Practice Guidelines. Respiratory Care Services Policy Number: [id number] Title: Aerosolized Medication Protocol Effective Date: 10/1998 Revised Date: 2013, 2016 Reviewed Date: 2014/ 2015 , 2017 I. Policy: The Aerosolized Medication Protocol shall by implemented by Respiratory Care              Practitioners (RCP) for patients with orders to receive aerosol therapy with medication. II. Purpose: To open and maintain obstructed airways, the RCP, will utilize the following  
protocol to select the indicated aerosolized medication(s) and determine the most effective method of delivery to the patient. III. Patient Type: All patients who are determined to meet aerosolized medication criteria as  
       outlined in this protocol. IV. Responsibility: Director, [address], registered Respiratory Care                                                     Practitioners (RCPs) with documented competency in the performance of                                     respiratory therapeutic techniques. V. Equipment needed: A. Stethoscope B. Pulse oximeter C. IPPB machine and circuit D. Aerosol nebulizer E. MDI Inhaler VI. Protocol: A. The following conditions are accepted indications for aerosolized medication therapy. 1. Bronchospasm/wheezing 2. Impaired mucociliary clearance 3. Tracheobronchial mucosal congestion/and laryngeal stridor 4. Diseases which commonly require aerosolized medication therapy include, but are not limited to: 
a. Asthma/reactive airway disease 
b. Bronchitis/emphysema (COPD) c. Cystic fibrosis 
d. Severe laryngitis/tracheitis 
e. Bronchiectasis 
f. Smoke inhalation or chemical trauma to the lung or upper airway 
g. Physical trauma to the upper airway 
h. Laryngotracheobronchitis 
i. Bronchiolitis 
j. Non-specific wheezing B. Indications for bronchodilator medications will include: 
a. Bronchospasm/ wheezing 
b. Asthma/reactive airway disease 
c. Chronic obstructive pulmonary disease 
d. Obstructive defect on pulmonary function testing C. Administration of medications 1. If a bronchodilator or any other type of respiratory medication is needed, a physician order must be indicated in the medication section in the patients EMR.   
2. When the physician specifies the medication and dosage at the time of request, the ordered medication will be used as part of the care plan. D. The following guidelines will be utilized in the evaluation and selection of the         appropriate delivery device for indicated medication(s): 
1. IPPB 
a. Ventilation is inadequate (rapid shallow breathing) b. Refractory atelectasis has developed, other forms of therapy are unsuccessful 
c. Inability to clear secretions 
d. Need to improve lung expansion 2. Unassisted aerosol (UA) is the preferred method of aerosol delivery and indicated if 
a. Ventilation is inadequate 
b. Patient demonstrates wheezing  
c. patient is unable to perform MDI effectively  
d. Patient preference 3. Metered Dose Inhaler (MDI)  
a. Patient is alert/cooperative 
b. Medication(s) available in this delivery method. c. Able to perform 3 second breath hold. d. Patient has demonstrated ability to use MDI effectively 
e. Patient has used MDI therapy previously, either at home or in the hospital. 
f. Note: The only approved inhalers on formulary are albuterol and Spiriva. VII. Guidelines:  
Monitor patients vital signs and evaluate patients clinical status. The need to change medication and/or modality may be indicated by: 1. A pulse greater than 120 bpm, or if a pulse increase of 20 bpm occurs with bronchodilator medications. 2. Significant worsening of dyspnea or wheezing occurring during or within 30 minutes of discontinuing therapy. 3. Worsening of patients sensorium (e.g. patient becomes confused or obtunded, and unable to follow directions). 4. Worsening of patients chest x-ray. 5. Change in sputum (e.g. increased pulmonary infiltrate, which might indicate need for volume expansion therapy). 6. Patient has difficulty coughing up secretions, which might indicate need for acetylcysteine and/or bronchial hygiene therapy. 7. Call physician immediately if dyspnea worsens and is not responsive to modifications allowed by protocol. VIII. Clinical Responsibility: A. The therapy assessment guidelines will be used to evaluate all patients receiving aerosolized medications with the exception of critical care areas. 1. RCPs will perform changes in therapy per protocol. 2. It will be the responsibility of RCP to provide instruction regarding respiratory medications, aerosol therapy and proper MDI technique, as well as, spacer usage to patients ordered MDI therapy. 3. Current therapy that is part of a patients home regimen will not be discontinued. IX. Documentation A. Document assessment findings in the respiratory assessment section of the patients EMR. B. Document changes in therapy per protocol in the respiratory orders section and in the care plan section of the patients EMR. C. Document patient education in the patient education section of the patients EMR. X. Outcome Criteria: A. Relief of wheezes and obstruction B. Improved cough and sputum color and consistency C. Improved chest x-ray D. Improved arterial oxygen tension and or SaO2 
E. Improved Peak Flow on asthmatic patients XI. Related Protocols: A. Respiratory Patient Care Protocols B. Bronchial Hygiene Therapy C. Oxygen Protocol Reference:

## 2019-02-12 LAB
ALBUMIN SERPL-MCNC: 3.2 G/DL (ref 3.2–4.6)
ALBUMIN/GLOB SERPL: 0.9 {RATIO}
ALP SERPL-CCNC: 100 U/L (ref 50–136)
ALT SERPL-CCNC: 58 U/L (ref 12–65)
ANION GAP SERPL CALC-SCNC: 10 MMOL/L
AST SERPL-CCNC: 22 U/L (ref 15–37)
BASOPHILS # BLD: 0.1 K/UL (ref 0–0.2)
BASOPHILS NFR BLD: 0 % (ref 0–2)
BILIRUB SERPL-MCNC: 0.5 MG/DL (ref 0.2–1.1)
BUN SERPL-MCNC: 123 MG/DL (ref 8–23)
CALCIUM SERPL-MCNC: 9.1 MG/DL (ref 8.3–10.4)
CHLORIDE SERPL-SCNC: 97 MMOL/L (ref 98–107)
CO2 SERPL-SCNC: 30 MMOL/L (ref 21–32)
CREAT SERPL-MCNC: 3.71 MG/DL (ref 0.6–1)
DIFFERENTIAL METHOD BLD: ABNORMAL
EOSINOPHIL # BLD: 0 K/UL (ref 0–0.8)
EOSINOPHIL NFR BLD: 0 % (ref 0.5–7.8)
ERYTHROCYTE [DISTWIDTH] IN BLOOD BY AUTOMATED COUNT: 15.9 % (ref 11.9–14.6)
GLOBULIN SER CALC-MCNC: 3.6 G/DL (ref 2.3–3.5)
GLUCOSE BLD STRIP.AUTO-MCNC: 167 MG/DL (ref 65–100)
GLUCOSE BLD STRIP.AUTO-MCNC: 187 MG/DL (ref 65–100)
GLUCOSE BLD STRIP.AUTO-MCNC: 213 MG/DL (ref 65–100)
GLUCOSE BLD STRIP.AUTO-MCNC: 221 MG/DL (ref 65–100)
GLUCOSE BLD STRIP.AUTO-MCNC: 239 MG/DL (ref 65–100)
GLUCOSE SERPL-MCNC: 180 MG/DL (ref 65–100)
HCT VFR BLD AUTO: 35.3 % (ref 35.8–46.3)
HGB BLD-MCNC: 11.4 G/DL (ref 11.7–15.4)
IMM GRANULOCYTES # BLD AUTO: 0.3 K/UL (ref 0–0.5)
IMM GRANULOCYTES NFR BLD AUTO: 2 % (ref 0–5)
LYMPHOCYTES # BLD: 1.3 K/UL (ref 0.5–4.6)
LYMPHOCYTES NFR BLD: 7 % (ref 13–44)
MAGNESIUM SERPL-MCNC: 2.8 MG/DL (ref 1.8–2.4)
MCH RBC QN AUTO: 29.1 PG (ref 26.1–32.9)
MCHC RBC AUTO-ENTMCNC: 32.3 G/DL (ref 31.4–35)
MCV RBC AUTO: 90.1 FL (ref 79.6–97.8)
MONOCYTES # BLD: 0.5 K/UL (ref 0.1–1.3)
MONOCYTES NFR BLD: 3 % (ref 4–12)
NEUTS SEG # BLD: 15.2 K/UL (ref 1.7–8.2)
NEUTS SEG NFR BLD: 88 % (ref 43–78)
NRBC # BLD: 0 K/UL (ref 0–0.2)
PLATELET # BLD AUTO: 383 K/UL (ref 150–450)
PMV BLD AUTO: 11.2 FL (ref 9.4–12.3)
POTASSIUM SERPL-SCNC: 4.1 MMOL/L (ref 3.5–5.1)
PROT SERPL-MCNC: 6.8 G/DL
RBC # BLD AUTO: 3.92 M/UL (ref 4.05–5.2)
SODIUM SERPL-SCNC: 137 MMOL/L (ref 136–145)
WBC # BLD AUTO: 17.3 K/UL (ref 4.3–11.1)

## 2019-02-12 PROCEDURE — 94640 AIRWAY INHALATION TREATMENT: CPT

## 2019-02-12 PROCEDURE — 74011250637 HC RX REV CODE- 250/637: Performed by: INTERNAL MEDICINE

## 2019-02-12 PROCEDURE — 74011000250 HC RX REV CODE- 250: Performed by: INTERNAL MEDICINE

## 2019-02-12 PROCEDURE — 85025 COMPLETE CBC W/AUTO DIFF WBC: CPT

## 2019-02-12 PROCEDURE — 77010033678 HC OXYGEN DAILY

## 2019-02-12 PROCEDURE — 94760 N-INVAS EAR/PLS OXIMETRY 1: CPT

## 2019-02-12 PROCEDURE — 36415 COLL VENOUS BLD VENIPUNCTURE: CPT

## 2019-02-12 PROCEDURE — 80053 COMPREHEN METABOLIC PANEL: CPT

## 2019-02-12 PROCEDURE — 99232 SBSQ HOSP IP/OBS MODERATE 35: CPT | Performed by: INTERNAL MEDICINE

## 2019-02-12 PROCEDURE — 74011250636 HC RX REV CODE- 250/636: Performed by: HOSPITALIST

## 2019-02-12 PROCEDURE — 74011636637 HC RX REV CODE- 636/637: Performed by: HOSPITALIST

## 2019-02-12 PROCEDURE — 83735 ASSAY OF MAGNESIUM: CPT

## 2019-02-12 PROCEDURE — 74011636637 HC RX REV CODE- 636/637: Performed by: INTERNAL MEDICINE

## 2019-02-12 PROCEDURE — 97530 THERAPEUTIC ACTIVITIES: CPT

## 2019-02-12 PROCEDURE — 65660000000 HC RM CCU STEPDOWN

## 2019-02-12 PROCEDURE — 74011250637 HC RX REV CODE- 250/637: Performed by: HOSPITALIST

## 2019-02-12 PROCEDURE — 82962 GLUCOSE BLOOD TEST: CPT

## 2019-02-12 RX ORDER — PREDNISONE 20 MG/1
40 TABLET ORAL
Status: DISCONTINUED | OUTPATIENT
Start: 2019-02-13 | End: 2019-02-13 | Stop reason: HOSPADM

## 2019-02-12 RX ORDER — INSULIN GLARGINE 100 [IU]/ML
35 INJECTION, SOLUTION SUBCUTANEOUS DAILY
Status: DISCONTINUED | OUTPATIENT
Start: 2019-02-13 | End: 2019-02-13 | Stop reason: HOSPADM

## 2019-02-12 RX ADMIN — IPRATROPIUM BROMIDE AND ALBUTEROL SULFATE 3 ML: .5; 3 SOLUTION RESPIRATORY (INHALATION) at 14:29

## 2019-02-12 RX ADMIN — PANTOPRAZOLE SODIUM 40 MG: 40 TABLET, DELAYED RELEASE ORAL at 06:31

## 2019-02-12 RX ADMIN — FLUTICASONE PROPIONATE 2 SPRAY: 50 SPRAY, METERED NASAL at 08:27

## 2019-02-12 RX ADMIN — CALCIUM ACETATE 1334 MG: 667 CAPSULE ORAL at 11:43

## 2019-02-12 RX ADMIN — INSULIN LISPRO 4 UNITS: 100 INJECTION, SOLUTION INTRAVENOUS; SUBCUTANEOUS at 11:45

## 2019-02-12 RX ADMIN — FUROSEMIDE 40 MG: 40 TABLET ORAL at 08:19

## 2019-02-12 RX ADMIN — APIXABAN 2.5 MG: 2.5 TABLET, FILM COATED ORAL at 22:24

## 2019-02-12 RX ADMIN — CARVEDILOL 6.25 MG: 6.25 TABLET, FILM COATED ORAL at 08:19

## 2019-02-12 RX ADMIN — ALLOPURINOL 100 MG: 100 TABLET ORAL at 08:19

## 2019-02-12 RX ADMIN — FERROUS SULFATE TAB 325 MG (65 MG ELEMENTAL FE) 325 MG: 325 (65 FE) TAB at 16:32

## 2019-02-12 RX ADMIN — INSULIN LISPRO 10 UNITS: 100 INJECTION, SOLUTION INTRAVENOUS; SUBCUTANEOUS at 08:27

## 2019-02-12 RX ADMIN — LORATADINE 10 MG: 10 TABLET ORAL at 08:19

## 2019-02-12 RX ADMIN — Medication 10 ML: at 06:32

## 2019-02-12 RX ADMIN — AMIODARONE HYDROCHLORIDE 200 MG: 200 TABLET ORAL at 08:19

## 2019-02-12 RX ADMIN — FERROUS SULFATE TAB 325 MG (65 MG ELEMENTAL FE) 325 MG: 325 (65 FE) TAB at 08:19

## 2019-02-12 RX ADMIN — INSULIN LISPRO 10 UNITS: 100 INJECTION, SOLUTION INTRAVENOUS; SUBCUTANEOUS at 16:33

## 2019-02-12 RX ADMIN — CALCIUM ACETATE 1334 MG: 667 CAPSULE ORAL at 16:32

## 2019-02-12 RX ADMIN — MONTELUKAST SODIUM 10 MG: 10 TABLET, FILM COATED ORAL at 22:24

## 2019-02-12 RX ADMIN — BUDESONIDE 500 MCG: 0.5 INHALANT RESPIRATORY (INHALATION) at 20:42

## 2019-02-12 RX ADMIN — FERROUS SULFATE TAB 325 MG (65 MG ELEMENTAL FE) 325 MG: 325 (65 FE) TAB at 11:43

## 2019-02-12 RX ADMIN — IPRATROPIUM BROMIDE AND ALBUTEROL SULFATE 3 ML: .5; 3 SOLUTION RESPIRATORY (INHALATION) at 09:12

## 2019-02-12 RX ADMIN — GUAIFENESIN 600 MG: 600 TABLET, EXTENDED RELEASE ORAL at 22:24

## 2019-02-12 RX ADMIN — BUDESONIDE 500 MCG: 0.5 INHALANT RESPIRATORY (INHALATION) at 09:12

## 2019-02-12 RX ADMIN — IPRATROPIUM BROMIDE AND ALBUTEROL SULFATE 3 ML: .5; 3 SOLUTION RESPIRATORY (INHALATION) at 20:42

## 2019-02-12 RX ADMIN — INSULIN LISPRO 4 UNITS: 100 INJECTION, SOLUTION INTRAVENOUS; SUBCUTANEOUS at 16:34

## 2019-02-12 RX ADMIN — ASPIRIN 81 MG: 81 TABLET, DELAYED RELEASE ORAL at 08:20

## 2019-02-12 RX ADMIN — ATORVASTATIN CALCIUM 40 MG: 40 TABLET, FILM COATED ORAL at 22:24

## 2019-02-12 RX ADMIN — METHYLPREDNISOLONE SODIUM SUCCINATE 40 MG: 40 INJECTION, POWDER, FOR SOLUTION INTRAMUSCULAR; INTRAVENOUS at 08:31

## 2019-02-12 RX ADMIN — APIXABAN 2.5 MG: 2.5 TABLET, FILM COATED ORAL at 08:19

## 2019-02-12 RX ADMIN — POTASSIUM CHLORIDE 10 MEQ: 10 TABLET, EXTENDED RELEASE ORAL at 08:19

## 2019-02-12 RX ADMIN — IPRATROPIUM BROMIDE AND ALBUTEROL SULFATE 3 ML: .5; 3 SOLUTION RESPIRATORY (INHALATION) at 02:26

## 2019-02-12 RX ADMIN — CARVEDILOL 6.25 MG: 6.25 TABLET, FILM COATED ORAL at 16:32

## 2019-02-12 RX ADMIN — INSULIN LISPRO 2 UNITS: 100 INJECTION, SOLUTION INTRAVENOUS; SUBCUTANEOUS at 22:25

## 2019-02-12 RX ADMIN — Medication 5 ML: at 22:25

## 2019-02-12 RX ADMIN — INSULIN LISPRO 10 UNITS: 100 INJECTION, SOLUTION INTRAVENOUS; SUBCUTANEOUS at 11:43

## 2019-02-12 RX ADMIN — INSULIN LISPRO 4 UNITS: 100 INJECTION, SOLUTION INTRAVENOUS; SUBCUTANEOUS at 08:27

## 2019-02-12 RX ADMIN — CALCIUM ACETATE 1334 MG: 667 CAPSULE ORAL at 08:19

## 2019-02-12 RX ADMIN — AZITHROMYCIN 500 MG: 250 TABLET, FILM COATED ORAL at 08:20

## 2019-02-12 RX ADMIN — INSULIN GLARGINE 30 UNITS: 100 INJECTION, SOLUTION SUBCUTANEOUS at 08:26

## 2019-02-12 RX ADMIN — GUAIFENESIN 600 MG: 600 TABLET, EXTENDED RELEASE ORAL at 08:19

## 2019-02-12 NOTE — PROGRESS NOTES
Hospitalist Progress Note Admit Date:  2019  9:59 AM  
Name:  Shiloh Gonzalez Age:  82 y.o. 
:  1935 MRN:  [id number] PCP:  Kareem Smith MD 
Treatment Team: Attending Provider: Claudean Pronto, MD; Care Manager: Suma Vásquez; Utilization Review: Xiomara Ojeda RN; Utilization Review: Rohan Padgett RN; Consulting Provider: Shad Patrick MD 
 
HPI/Subjective:  
Ms. Evelyn Bolaños is an 81 yo obese white female with a pmh of WILLIAM (not on cpap), CHF, Afib on eliquis and amiodarone, CKD stage 5 (baseline Cr 3-4) s/p recent AVF placement on , DM2 who presented  with report of increasing wheeezing, cough and fever/chills for last 3-4 days. She has COPD and usually wears 2.5 L during day ( see Dr. Katrina Walker at [address] ) she was found hypoxic at 85% in ED and was admitted for COPD flare up and possible Left arm cellulitis as there was tenderness and swelling. Placed on zosyn and vancomycin. LUE AVG evaluated by vascular surgery and graft is NOT infected and with thrill. She wears 2.5 liters O2 at baseline. : O2 down to 3L NC. Doesn't feel well today, tired mostly. Has not sat in the chair as much as previous days. No pain or worsening resp status. Appetite is ok. ROS otherwise negative. Objective:  
 
Patient Vitals for the past 24 hrs: 
 Temp Pulse Resp BP SpO2  
19 1558 97.5 °F (36.4 °C) 72 25 157/68 91 % 19 1429     94 % 19 1141 97.1 °F (36.2 °C) 72 25 156/58 91 % 19 0912     97 % 19 0725 97.8 °F (36.6 °C) 80 24 165/67 90 % 19 0334 98 °F (36.7 °C) 71 20 142/60 93 % 19 0227     94 % 19 0025 97.4 °F (36.3 °C) 73 20 142/60 92 % 19     98 % 19 97.5 °F (36.4 °C) 73 20 130/54 95 % Oxygen Therapy O2 Sat (%): 91 % (19 1558) Pulse via Oximetry: 76 beats per minute (19 1429) O2 Device: Nasal cannula (19 1429) O2 Flow Rate (L/min): 3 l/min (02/12/19 1429) O2 Temperature: (no value) (02/11/19 1302) FIO2 (%): 32 % (02/12/19 1429) Intake/Output Summary (Last 24 hours) at 2/12/2019 1612 Last data filed at 2/12/2019 1423 Gross per 24 hour Intake  Output 1850 ml Net -1850 ml *Note that automatically entered I/Os may not be accurate; dependent on patient compliance with collection and accurate  by assistants. General:    Well nourished. Alert. Obese. CV:   RRR. No murmur, rub, or gallop. Lungs:   Scattered b/l wheezes. O2 NC 3L/min. Abdomen:   Soft, nontender, nondistended. Extremities: Warm and dry. No cyanosis or edema. Skin:     No rashes or jaundice. Neuro:  No gross focal deficits Data Review: 
I have reviewed all labs, meds, and studies from the last 24 hours: 
 
Recent Results (from the past 24 hour(s)) GLUCOSE, POC Collection Time: 02/11/19  9:17 PM  
Result Value Ref Range Glucose (POC) 187 (H) 65 - 100 mg/dL METABOLIC PANEL, COMPREHENSIVE Collection Time: 02/12/19  6:25 AM  
Result Value Ref Range Sodium 137 136 - 145 mmol/L Potassium 4.1 3.5 - 5.1 mmol/L Chloride 97 (L) 98 - 107 mmol/L  
 CO2 30 21 - 32 mmol/L Anion gap 10 mmol/L Glucose 180 (H) 65 - 100 mg/dL  (H) 8 - 23 MG/DL Creatinine 3.71 (H) 0.6 - 1.0 MG/DL  
 GFR est AA 15 (L) >60 ml/min/1.73m2 GFR est non-AA 12 ml/min/1.73m2 Calcium 9.1 8.3 - 10.4 MG/DL Bilirubin, total 0.5 0.2 - 1.1 MG/DL  
 ALT (SGPT) 58 12 - 65 U/L  
 AST (SGOT) 22 15 - 37 U/L Alk. phosphatase 100 50 - 136 U/L Protein, total 6.8 g/dL Albumin 3.2 3.2 - 4.6 g/dL Globulin 3.6 (H) 2.3 - 3.5 g/dL A-G Ratio 0.9    
CBC WITH AUTOMATED DIFF Collection Time: 02/12/19  6:25 AM  
Result Value Ref Range WBC 17.3 (H) 4.3 - 11.1 K/uL  
 RBC 3.92 (L) 4.05 - 5.2 M/uL  
 HGB 11.4 (L) 11.7 - 15.4 g/dL HCT 35.3 (L) 35.8 - 46.3 %  MCV 90.1 79.6 - 97.8 FL  
 MCH 29.1 26.1 - 32.9 PG  
 MCHC 32.3 31.4 - 35.0 g/dL  
 RDW 15.9 (H) 11.9 - 14.6 % PLATELET 900 021 - 782 K/uL MPV 11.2 9.4 - 12.3 FL ABSOLUTE NRBC 0.00 0.0 - 0.2 K/uL  
 DF AUTOMATED NEUTROPHILS 88 (H) 43 - 78 % LYMPHOCYTES 7 (L) 13 - 44 % MONOCYTES 3 (L) 4.0 - 12.0 % EOSINOPHILS 0 (L) 0.5 - 7.8 % BASOPHILS 0 0.0 - 2.0 % IMMATURE GRANULOCYTES 2 0.0 - 5.0 %  
 ABS. NEUTROPHILS 15.2 (H) 1.7 - 8.2 K/UL  
 ABS. LYMPHOCYTES 1.3 0.5 - 4.6 K/UL  
 ABS. MONOCYTES 0.5 0.1 - 1.3 K/UL  
 ABS. EOSINOPHILS 0.0 0.0 - 0.8 K/UL  
 ABS. BASOPHILS 0.1 0.0 - 0.2 K/UL  
 ABS. IMM. GRANS. 0.3 0.0 - 0.5 K/UL MAGNESIUM Collection Time: 02/12/19  6:25 AM  
Result Value Ref Range Magnesium 2.8 (H) 1.8 - 2.4 mg/dL GLUCOSE, POC Collection Time: 02/12/19  6:59 AM  
Result Value Ref Range Glucose (POC) 187 (H) 65 - 100 mg/dL GLUCOSE, POC Collection Time: 02/12/19  8:26 AM  
Result Value Ref Range Glucose (POC) 221 (H) 65 - 100 mg/dL GLUCOSE, POC Collection Time: 02/12/19 11:07 AM  
Result Value Ref Range Glucose (POC) 239 (H) 65 - 100 mg/dL GLUCOSE, POC Collection Time: 02/12/19  3:57 PM  
Result Value Ref Range Glucose (POC) 213 (H) 65 - 100 mg/dL All Micro Results Procedure Component Value Units Date/Time CULTURE, BLOOD [[id number]] Collected:  02/06/19 1041 Order Status:  Completed Specimen:  Whole Blood Updated:  02/11/19 1004 Special Requests: --     
  RIGHT 
HAND Culture result: NO GROWTH 5 DAYS     
 CULTURE, BLOOD [[id number]] Collected:  02/06/19 1041 Order Status:  Completed Specimen:  Whole Blood Updated:  02/11/19 1004 Special Requests: --     
  RIGHT 
ARM Culture result: NO GROWTH 5 DAYS INFLUENZA A & B AG (RAPID TEST) [[id number]] Collected:  02/07/19 0059 Order Status:  Completed Specimen:  Nasopharyngeal from Nasal washing Updated:  02/07/19 0121   Influenza A Ag NEGATIVE      
 Comment: NEGATIVE FOR THE PRESENCE OF INFLUENZA A ANTIGEN 
INFECTION DUE TO INFLUENZA A CANNOT BE RULED OUT. BECAUSE THE ANTIGEN PRESENT IN THE SAMPLE MAY BE BELOW 
THE DETECTION LIMIT OF THE TEST. A NEGATIVE TEST IS PRESUMPTIVE AND IT IS RECOMMENDED THAT THESE RESULTS BE CONFIRMED BY VIRAL CULTURE OR AN FDA-CLEARED INFLUENZA A AND B MOLECULAR ASSAY. Influenza B Ag NEGATIVE Comment: NEGATIVE FOR THE PRESENCE OF INFLUENZA B ANTIGEN 
INFECTION DUE TO INFLUENZA B CANNOT BE RULED OUT. BECAUSE THE ANTIGEN PRESENT IN THE SAMPLE MAY BE BELOW 
THE DETECTION LIMIT OF THE TEST. A NEGATIVE TEST IS PRESUMPTIVE AND IT IS RECOMMENDED THAT THESE RESULTS BE CONFIRMED BY VIRAL CULTURE OR AN FDA-CLEARED INFLUENZA A AND B MOLECULAR ASSAY. Source NASOPHARYNGEAL     
 CULTURE, RESPIRATORY/SPUTUM/BRONCH Gloria Page STAIN [[id number]] Collected:  19 1645 Order Status:  Canceled Specimen:  Sputum Results for orders placed or performed during the hospital encounter of 19  
2D ECHO COMPLETE ADULT (TTE) W OR WO CONTR Narrative [address] [place], [address] [facility] 
[phone] Transthoracic Echocardiogram 
2D, M-mode, Doppler, and Color Doppler Patient: Efrain Knox 
MR #: [id number] : 1935 Age: 82 years Gender: Female Study date: 2019 Account #: [de-identified] Height: 62 in 
Weight: 239.6 lb 
BSA: 2.07 mï¾² Status:Routine Location: [facility] BP: 153/ 65 Allergies: ALPRAZOLAM, MEPERIDINE, LEVOFLOXACIN, OXYCODONE, FAMOTIDINE, PROMETHAZINE, SITAGLIPTIN, SULFA (SULFONAMIDE ANTIBIOTICS) Sonographer:  Elizabeth Pickard RDCS Group:  [facility] Referring Physician:  Lima Lane MD 
Reading Physician:  Steve Crockett MD [facility] INDICATIONS: Pulm Edema due to chronic heart failure; effect of AV graft 
placement? PROCEDURE: This was a routine study. A transthoracic echocardiogram was performed. The study included complete 2D imaging, M-mode, complete spectral 
Doppler, and color Doppler. Intravenous contrast (Definity) was administered. Image quality was adequate. LEFT VENTRICLE: Size was at the upper limits of normal. Systolic function was 
normal. Ejection fraction was estimated in the range of 55 % to 60 %. There 
were no regional wall motion abnormalities. There was mild concentric 
hypertrophy. The E/e' ratio was 28.5. There was diastolic dysfunction. RIGHT VENTRICLE: The size was normal. Systolic function was normal. Estimated 
peak pressure was in the range of 55-60 mmHg. LEFT ATRIUM: The atrium was mildly dilated. RIGHT ATRIUM: The atrium was mildly dilated. SYSTEMIC VEINS: IVC: The inferior vena cava was mildly dilated. The 
respirophasic change in diameter was more than 50%. AORTIC VALVE: The valve was structurally normal, tri-commissural. There was  
no 
evidence for stenosis. There was no insufficiency. MITRAL VALVE: There was mild annular calcification. Transmitral velocity was 
minimally increased. There was no evidence for stenosis. There was mild to 
moderate regurgitation. TRICUSPID VALVE: The valve structure was normal. There was no evidence for 
stenosis. There was mild regurgitation. PULMONIC VALVE: The valve structure was normal. There was no evidence for 
stenosis. There was trivial regurgitation. PERICARDIUM: There was no pericardial effusion. AORTA: The root exhibited normal size. SUMMARY: 
 
-  Left ventricle: Size was at the upper limits of normal. Systolic function 
was normal. Ejection fraction was estimated in the range of 55 % to 60 %. There 
were no regional wall motion abnormalities. There was mild concentric 
hypertrophy. The E/e' ratio was 28.5. There was diastolic dysfunction. -  Left atrium: The atrium was mildly dilated. -  Right atrium: The atrium was mildly dilated. -  Inferior vena cava, hepatic veins: The inferior vena cava was mildly 
dilated. The respirophasic change in diameter was more than 50%. -  Mitral valve: There was mild annular calcification. There was mild to 
moderate regurgitation. 
 
-  Tricuspid valve: There was mild regurgitation. SYSTEM MEASUREMENT TABLES 
 
2D mode AoR Diam (2D): 2.9 cm 
LA Dimension (2D): 4.1 cm Left Atrium Systolic Volume Index; Method of Disks, Biplane; 2D mode;: 37.7 
ml/m2 IVS/LVPW (2D): 1.1 IVSd (2D): 1.4 cm LVIDd (2D): 5.5 cm LVIDs (2D): 3.5 cm 
LVOT Area (2D): 2.8 cm2 LVPWd (2D): 1.2 cm RVIDd (2D): 3.4 cm Tissue Doppler Imaging LV Peak Early Grider Tissue Efren; Lateral MA (TDI): 6.8 cm/s LV Peak Early Grider Tissue Efren; Medial MA (TDI): 4.6 cm/s Unspecified Scan Mode Peak Grad; Mean; Antegrade Flow: 15 mm[Hg] Vmax; Antegrade Flow: 195 cm/s LVOT Diam: 1.9 cm 
MV Peak Efren/LV Peak Tissue Efren E-Wave; Lateral MA: 23 
MV Peak Efren/LV Peak Tissue Efren E-Wave; Medial MA: 34 
RVSP: 51 mm[Hg] Vmax; Regurgitant Flow: 347 cm/s Prepared and signed by 
 
Jaciel Perry MD [facility] Signed 11-Feb-2019 18:03:53 Current Meds: 
Current Facility-Administered Medications Medication Dose Route Frequency  [START ON 2/13/2019] predniSONE (DELTASONE) tablet 40 mg  40 mg Oral DAILY WITH BREAKFAST  albuterol-ipratropium (DUO-NEB) 2.5 MG-0.5 MG/3 ML  3 mL Nebulization Q6H RT  
 polyethylene glycol (MIRALAX) packet 17 g  17 g Oral DAILY PRN  
 ferrous sulfate tablet 325 mg  1 Tab Oral TID WITH MEALS  calcium acetate (PHOSLO) capsule 1,334 mg  2 Cap Oral TID WITH MEALS  insulin glargine (LANTUS) injection 30 Units  30 Units SubCUTAneous DAILY  insulin lispro (HUMALOG) injection 10 Units  10 Units SubCUTAneous TIDAC  carvedilol (COREG) tablet 6.25 mg  6.25 mg Oral BID WITH MEALS  
 azithromycin (ZITHROMAX) tablet 500 mg  500 mg Oral DAILY  0.9% sodium chloride infusion 250 mL  250 mL IntraVENous PRN  
  0.9% sodium chloride infusion 250 mL  250 mL IntraVENous PRN  
 hydrALAZINE (APRESOLINE) 20 mg/mL injection 20 mg  20 mg IntraVENous Q6H PRN  
 allopurinol (ZYLOPRIM) tablet 100 mg  100 mg Oral DAILY  amiodarone (CORDARONE) tablet 200 mg  200 mg Oral DAILY  apixaban (ELIQUIS) tablet 2.5 mg  2.5 mg Oral Q12H  aspirin delayed-release tablet 81 mg  81 mg Oral DAILY  loratadine (CLARITIN) tablet 10 mg  10 mg Oral DAILY  fluticasone (FLONASE) 50 mcg/actuation nasal spray 2 Spray  2 Spray Both Nostrils DAILY  furosemide (LASIX) tablet 40 mg  40 mg Oral DAILY  budesonide (PULMICORT) 500 mcg/2 ml nebulizer suspension  500 mcg Nebulization BID RT  
 insulin lispro (HUMALOG) injection   SubCUTAneous AC&HS  
 LORazepam (ATIVAN) tablet 0.5 mg  0.5 mg Oral TID PRN  
 montelukast (SINGULAIR) tablet 10 mg  10 mg Oral QHS  pantoprazole (PROTONIX) tablet 40 mg  40 mg Oral ACB  potassium chloride (KLOR-CON) tablet 10 mEq  10 mEq Oral DAILY  traMADol (ULTRAM) tablet 50 mg  50 mg Oral Q8H PRN  
 guaiFENesin ER (MUCINEX) tablet 600 mg  600 mg Oral Q12H  
 sodium chloride (NS) flush 5-40 mL  5-40 mL IntraVENous Q8H  
 sodium chloride (NS) flush 5-40 mL  5-40 mL IntraVENous PRN  
 atorvastatin (LIPITOR) tablet 40 mg  40 mg Oral QHS Other Studies (last 24 hours): No results found. Assessment and Plan:  
 
Hospital Problems as of 2/12/2019 Date Reviewed: 1/31/2019 Codes Class Noted - Resolved POA Uncontrolled type 2 diabetes mellitus with hyperglycemia (HCC) ICD-10-CM: E11.65 ICD-9-CM: 250.02  2/8/2019 - Present Yes Anemia of renal disease ICD-10-CM: D63.1 ICD-9-CM: 285.21  2/8/2019 - Present Yes  
   
 WILLIAM (obstructive sleep apnea) ICD-10-CM: E83.39 
ICD-9-CM: 327.23  2/7/2019 - Present Yes Overview Signed 2/7/2019 10:49 AM by Janey Stratton MD  
  Not using CPAP See Dr. Moses Heller at [facility]  * (Principal) Acute on chronic respiratory failure with hypoxia (HCC) ICD-10-CM: Z00.72 
ICD-9-CM: 518.84, 799.02  2/6/2019 - Present Yes Cellulitis ICD-10-CM: L03.90 ICD-9-CM: 682.9  2/6/2019 - Present Yes COPD exacerbation [facility] ICD-10-CM: J44.1 ICD-9-CM: 491.21  2/6/2019 - Present Yes Anemia ICD-10-CM: D64.9 ICD-9-CM: 285.9  2/6/2019 - Present Yes Pulmonary infiltrate in right lung on chest x-ray ICD-10-CM: R91.8 ICD-9-CM: 793.19  2/6/2019 - Present Yes Severe obesity ([facility] ICD-10-CM: E66.01 
ICD-9-CM: 278.01  1/24/2019 - Present Yes Stage 4 chronic kidney disease ([facility] ICD-10-CM: N18.4 ICD-9-CM: 585.4  1/24/2019 - Present Yes Plan: # Acute on chronic hypoxemic respiratory failure - Multifactorial, COPD/volume overload/pneumonia 
 - PO steroid taper. Abx to finish 2/14. PO lasix. Nebs. - Home dose 2-5L NC; now on 3L # AECOPD 
 - as above. - Followed by [employer] # CKD/anemia of chronic inflammation 
 - nephro appreciated - PO iron # Chronic dCHF 
 - PO lasix. Con't home meds. # DM 
 - Increase to 35U Lantus. # LUE swelling - thought due to cellulitis/AVF infection. Now off abx. # WILLIAM - Followed by [facility] along with COPD 
 
DC planning/Dispo:  As above. Diet:  DIET DIABETIC CONSISTENT CARB 
DIET NUTRITIONAL SUPPLEMENTS 
DVT ppx:  ambulation Signed: 
Daphney Valente MD

## 2019-02-12 NOTE — PROGRESS NOTES
Ot note: attempted to see patient this afternoon. She is in bed and declined. Will re attempt at another time.  
suleman Alarcon OTR/L

## 2019-02-12 NOTE — PROGRESS NOTES
Assessment completed and documented. Dyspnea at rest and with exertion. Lung sounds diminished. Heart sounds regular. Abdomen soft. Bowel sounds active. Voiding without difficulty. Vitals stable. Currently resting in bed. Will continue to monitor with hourly rounding.

## 2019-02-12 NOTE — PROGRESS NOTES
Problem: Mobility Impaired (Adult and Pediatric) Goal: *Acute Goals and Plan of Care (Insert Text) DISCHARGE GOALS: 
(1.)Ms. Sobeida John will move from supine to sit and sit to supine  in bed with SUPERVISION within 5 treatment day(s). (2.)Ms. Sobeida John will transfer from bed to chair and chair to bed with SUPERVISION using the least restrictive device within 5 treatment day(s). (3.)Ms. Sobeida John will ambulate with SUPERVISION for 25 feet with the least restrictive device within 5 treatment day(s). ________________________________________________________________________________________________ PHYSICAL THERAPY: Daily Note and AM 2/12/2019INPATIENT: PT Visit Days : 3 Payor: Gerald Mix / Plan: [address] [place] [address] HMO / Product Type: [employer] /   
  
NAME/AGE/GENDER: Kiara Bone is a 82 y.o. female PRIMARY DIAGNOSIS: Acute on chronic respiratory failure with hypoxia ([facility] [J96.21] Acute on chronic respiratory failure with [facility] [J96.21] COPD exacerbation (HCC) [J44.1] Acute on chronic respiratory failure with hypoxia (HCC) Acute on chronic respiratory failure with hypoxia (HCC) ICD-10: Treatment Diagnosis:  
 · Generalized Muscle Weakness (M62.81) · Difficulty in walking, Not elsewhere classified (R26.2) Precaution/Allergies: 
Alprazolam; Demerol [meperidine]; Levaquin [levofloxacin]; Oxycodone; Pepcid [famotidine]; Phenergan [promethazine]; Sitagliptin; and Sulfa (sulfonamide antibiotics) ASSESSMENT:  
Ms. Sobeida John tolerated session fairly. Limited tolerance for activity but reports she was up in the chair for awhile this am.  Patient primarily SBA for mobility but hasn't walked much distance. Needs min A for LEs with sit to supine. Patient anxious to go home. Should resume [facility] services at d/c. This section established at most recent assessment PROBLEM LIST (Impairments causing functional limitations): 1. Decreased Strength 2. Decreased ADL/Functional Activities 3. Decreased Transfer Abilities 4. Decreased Ambulation Ability/Technique 5. Decreased Balance 6. Decreased Activity Tolerance 7. Increased Shortness of Breath 8. Edema/Girth INTERVENTIONS PLANNED: (Benefits and precautions of physical therapy have been discussed with the patient.) 1. Balance Exercise 2. Bed Mobility 3. Family Education 4. Gait Training 5. Therapeutic Activites 6. Therapeutic Exercise/Strengthening TREATMENT PLAN: Frequency/Duration: daily for duration of hospital stay Rehabilitation Potential For Stated Goals: Good RECOMMENDED REHABILITATION/EQUIPMENT: (at time of discharge pending progress): Due to the probability of continued deficits (see above) this patient will likely need continued skilled physical therapy after discharge. Equipment:  
? None at this time HISTORY:  
History of Present Injury/Illness (Reason for Referral): 
Pt 83F with pmhx of CABG x 4, WILLIAM does not use CPAP, CHF, Afib on eliquis and amiodarone, CKD stage 5 (baseline Cr 3-4) s/p recent AVF placement on 1/31, DM2 presents with report of increasing wheeezing, cough and fever/chills for last 3-4 days. She has COPD and usually wears 2.5 L during day. Was found hypoxic at 85% on this amount. Now requiring 4lnc. Also noted to have red/warm LUE s/p recent AV graft placement CXR report RLL infiltrate. WBC 10K, hgb 7.6 (usually hgb around 8.5) 
  Hospitalist asked to admit for COPDe and LUE cellulitis. Past Medical History/Comorbidities: Ms. Monie Rizvi  has a past medical history of Adverse effect of anesthesia, Anemia, Anxiety, Asthma, Atrial flutter ([address] CAD (coronary artery disease) (2013), Cancer ([address] Chronic kidney disease, Chronic obstructive pulmonary disease ([address] Chronic pain, Constipation, DDD (degenerative disc disease), lumbar, Depression, Fatty liver, GERD (gastroesophageal reflux disease), Heart abnormality, Heart failure ([address] Hypercholesterolemia, Hypertension, Kidney disease, Morbid obesity ([facility] Nausea & vomiting, Osteoarthritis, PUD (peptic ulcer disease), Sleep apnea, Type 2 diabetes mellitus ([facility] and Vitamin B12 deficiency. Ms. Fuad Moreno  has a past surgical history that includes hx heart catheterization (2013); pr cabg, artery-vein, four (2013); vascular surgery procedure unlist (Right); hx hysterectomy; hx gyn; hx other surgical (1992); pr neurological procedure unlisted; hx appendectomy; hx tonsillectomy; and hx colonoscopy. Social History/Living Environment:  
Home Environment: Private residence Wheelchair Ramp: Yes One/Two Story Residence: Two story(basement - lives on main level and doesn't use basement) Living Alone: No 
Support Systems: Family member(s), Spouse/Significant Other/Partner Patient Expects to be Discharged to[de-identified] Private residence Current DME Used/Available at Home: Cane, straight, Commode, bedside, Glucometer, Nebulizer, Walker, rollator, Wheelchair Tub or Shower Type: Other (comment)(walk-in tub/shower with built in seat) Prior Level of Function/Work/Activity: 
Limited ambulation-using rollator or wheelchair. Number of Personal Factors/Comorbidities that affect the Plan of Care: 1-2: MODERATE COMPLEXITY EXAMINATION:  
Most Recent Physical Functioning:  
Gross Assessment: 3-/5 throughout AROM: Generally decreased, functional 
Strength: Generally decreased, functional 
Coordination: Generally decreased, functional 
Tone: Normal 
         
  
 
Posture (WDL): Exceptions to [facility] Posture Assessment: Forward head, Rounded shoulders Balance: 
Sitting: Intact Standing: With support Bed Mobility: 
Supine to Sit: Stand-by assistance Sit to Supine: Minimum assistance Transfers: 
Sit to Stand: Stand-by assistance Stand to Sit: Stand-by assistance Bed to Chair: Stand-by assistance Gait: 
  
Base of Support: Center of gravity altered Speed/Kari: Slow Distance (ft): 3 Feet (ft) Ambulation - Level of Assistance: Stand-by assistance(for limited steps and with UE support on furniture) Body Structures Involved: 1. Muscles Body Functions Affected: 1. Movement Related Activities and Participation Affected: 1. Mobility 2. Self Care Number of elements that affect the Plan of Care: 4+: HIGH COMPLEXITY CLINICAL PRESENTATION:  
Presentation: Stable and uncomplicated: LOW COMPLEXITY CLINICAL DECISION MAKING:  
[facility] AM-PAC 6 Clicks Basic Mobility Inpatient Short Form How much difficulty does the patient currently have. .. Unable A Lot A Little None 1. Turning over in bed (including adjusting bedclothes, sheets and blankets)? [] 1   [] 2   [x] 3   [] 4  
2. Sitting down on and standing up from a chair with arms ( e.g., wheelchair, bedside commode, etc.)   [] 1   [] 2   [x] 3   [] 4  
3. Moving from lying on back to sitting on the side of the bed? [] 1   [] 2   [x] 3   [] 4 How much help from another person does the patient currently need. .. Total A Lot A Little None 4. Moving to and from a bed to a chair (including a wheelchair)? [] 1   [] 2   [x] 3   [] 4  
5. Need to walk in hospital room? [] 1   [] 2   [x] 3   [] 4  
6. Climbing 3-5 steps with a railing? [] 1   [x] 2   [] 3   [] 4  
© 2007, Trustees of [facility], under license to [employer]. All rights reserved Score:  Initial: 17 Most Recent: X (Date: -- ) Interpretation of Tool:  Represents activities that are increasingly more difficult (i.e. Bed mobility, Transfers, Gait). Medical Necessity:    
· Patient is expected to demonstrate progress in strength, balance and coordination to increase independence with mobility and improve tolerance for activity. Reason for Services/Other Comments: 
· Patient continues to require skilled intervention due to decreased strength and mobility.   
Use of outcome tool(s) and clinical judgement create a POC that gives a: Clear prediction of patient's progress: LOW COMPLEXITY  
  
 
 
 
TREATMENT:  
(In addition to Assessment/Re-Assessment sessions the following treatments were rendered) Pre-treatment Symptoms/Complaints: Patient needing to use the bathroom. Pain: Initial:  
Pain Intensity 1: 0  Post Session:  0/10 Therapeutic Activity: (    20 minutes): Therapeutic activities including supine <> sit transfers, sit <> stand transfers, bed <> bedside commode transfers, and dressing to improve mobility, strength, balance and coordination. Required minimal verbal cues   to promote dynamic balance in standing. Braces/Orthotics/Lines/Etc:  
· IV 
· O2-nasal canula Treatment/Session Assessment:   
· Response to Treatment: Primarily SBA but little tolerance for activity. · Interdisciplinary Collaboration:  
o Physical Therapist 
o Registered Nurse · After treatment position/precautions:  
o Supine in bed 
o Bed/Chair-wheels locked 
o Bed in low position 
o Call light within reach 
o RN notified · Compliance with Program/Exercises: Compliant all of the time · Recommendations/Intent for next treatment session: \"Next visit will focus on advancements to more challenging activities and reduction in assistance provided\". Total Treatment Duration: PT Patient Time In/Time Out Time In: 1100 Time Out: 1120 Darlen Hashimoto, PT

## 2019-02-12 NOTE — PROGRESS NOTES
Johan Clark Admission Date: 2/6/2019 Daily Progress Note: 2/12/2019 Patient is a 82 y.o.  female seen and evaluated at the request of Dr. Leonard Nickerson for the evaluation of respiratory failure. The patient is obese female with history of WILLIAM ,not complaint with CPAP, COPD, chronic respiratory failure on 2.5L NC,. A fib on Eliquis, CKD stage 5 s/p recent AVF in 1/2019 presented with few says history of increased SOB, wheezing, cough and fever and was admitted for COPD exacerbation and L arm cellulitis. She is on Rocephin and Zithromax She is followed by [facility] She feels only little better On airvo 45 % Subjective: Weaned to 3L NC this morning. Negative 10L since admission. Current Facility-Administered Medications Medication Dose Route Frequency  [START ON 2/13/2019] predniSONE (DELTASONE) tablet 40 mg  40 mg Oral DAILY WITH BREAKFAST  albuterol-ipratropium (DUO-NEB) 2.5 MG-0.5 MG/3 ML  3 mL Nebulization Q6H RT  
 polyethylene glycol (MIRALAX) packet 17 g  17 g Oral DAILY PRN  
 ferrous sulfate tablet 325 mg  1 Tab Oral TID WITH MEALS  calcium acetate (PHOSLO) capsule 1,334 mg  2 Cap Oral TID WITH MEALS  insulin glargine (LANTUS) injection 30 Units  30 Units SubCUTAneous DAILY  insulin lispro (HUMALOG) injection 10 Units  10 Units SubCUTAneous TIDAC  carvedilol (COREG) tablet 6.25 mg  6.25 mg Oral BID WITH MEALS  
 azithromycin (ZITHROMAX) tablet 500 mg  500 mg Oral DAILY  0.9% sodium chloride infusion 250 mL  250 mL IntraVENous PRN  
 0.9% sodium chloride infusion 250 mL  250 mL IntraVENous PRN  
 hydrALAZINE (APRESOLINE) 20 mg/mL injection 20 mg  20 mg IntraVENous Q6H PRN  
 allopurinol (ZYLOPRIM) tablet 100 mg  100 mg Oral DAILY  amiodarone (CORDARONE) tablet 200 mg  200 mg Oral DAILY  apixaban (ELIQUIS) tablet 2.5 mg  2.5 mg Oral Q12H  aspirin delayed-release tablet 81 mg  81 mg Oral DAILY  loratadine (CLARITIN) tablet 10 mg  10 mg Oral DAILY  fluticasone (FLONASE) 50 mcg/actuation nasal spray 2 Spray  2 Spray Both Nostrils DAILY  furosemide (LASIX) tablet 40 mg  40 mg Oral DAILY  budesonide (PULMICORT) 500 mcg/2 ml nebulizer suspension  500 mcg Nebulization BID RT  
 insulin lispro (HUMALOG) injection   SubCUTAneous AC&HS  
 LORazepam (ATIVAN) tablet 0.5 mg  0.5 mg Oral TID PRN  
 montelukast (SINGULAIR) tablet 10 mg  10 mg Oral QHS  pantoprazole (PROTONIX) tablet 40 mg  40 mg Oral ACB  potassium chloride (KLOR-CON) tablet 10 mEq  10 mEq Oral DAILY  traMADol (ULTRAM) tablet 50 mg  50 mg Oral Q8H PRN  
 guaiFENesin ER (MUCINEX) tablet 600 mg  600 mg Oral Q12H  
 sodium chloride (NS) flush 5-40 mL  5-40 mL IntraVENous Q8H  
 sodium chloride (NS) flush 5-40 mL  5-40 mL IntraVENous PRN  
 atorvastatin (LIPITOR) tablet 40 mg  40 mg Oral QHS Objective:  
 
Vitals:  
 02/12/19 0775 02/12/19 0710 02/12/19 0725 02/12/19 1543 BP:  142/60 165/67 Pulse:  71 80 Resp:  20 24 Temp:  98 °F (36.7 °C) 97.8 °F (36.6 °C) SpO2: 94% 93% 90% 97% Weight:      
Height:      
 
Intake and Output:  
02/10 1901 - 02/12 0700 In: -  
Out: 2500 [Urine:2500] 02/12 0701 - 02/12 1900 In: -  
Out: 400 [Urine:400] Physical Exam:         
GEN: well developed and in no acute distress, Oxygen per Wes Counts HEENT:  PERRL, EOMI, no alar flaring or epistaxis, oral mucosa moist without cyanosis, NECK:  no JVD, no retractions, no thyromegaly or masses, LUNGS:  Mild wheezing HEART:  RRR with no M,G,R; 
ABDOMEN:  soft with no tenderness; positive bowel sounds present EXTREMITIES:  warm with no cyanosis, 1+ lower leg edema SKIN:  no jaundice or ecchymosis NEURO:  alert and oriented, grossly non-focal 
 
CHEST XRAY: pulmonary edema vs atelectasis vs pneumonia (2/6) LAB Recent Labs  
  02/12/19 
7577 02/10/19 
9394 WBC 17.3* 11.3* HGB 11.4* 9.9*  
HCT 35.3* 31.2*  
 316 Recent Labs  
  02/12/19 
2404 02/10/19 
1683  138  
K 4.1 4.1 CL 97* 98  
CO2 30 26 * 247* * 105* CREA 3.71* 3.80* MG 2.8*  -- No results for input(s): PH, PCO2, PO2, HCO3 in the last 72 hours. No results for input(s): LCAD, LAC in the last 72 hours. Assessment:  
 
Patient Active Problem List  
Diagnosis Code  Hydronephrosis N13.30  
 Incomplete bladder emptying R33.9  Mixed incontinence urge and stress (male)(female) N39.46  
 Acquired cyst of kidney N28.1  Severe obesity (HCC) E66.01  
 Stage 4 chronic kidney disease ([address] N18.4  Acute on chronic respiratory failure with hypoxia (HCC) J96.21  
 Cellulitis L03.90  
 COPD exacerbation (HCC) J44.1  Anemia D64.9  
 Pulmonary infiltrate in right lung on chest x-ray R91.8  WILLIAM (obstructive sleep apnea) G47.33  
 Uncontrolled type 2 diabetes mellitus with hyperglycemia (HCC) E11.65  Anemia of renal disease D63.1 Plan Hospital Problems  Date Reviewed: 1/31/2019 Codes Class Noted POA Uncontrolled type 2 diabetes mellitus with hyperglycemia (HCC) ICD-10-CM: E11.65 ICD-9-CM: 250.02  2/8/2019 Yes Anemia of renal disease ICD-10-CM: D63.1 ICD-9-CM: 285.21  2/8/2019 Yes WILLIAM (obstructive sleep apnea) ICD-10-CM: G47.33 
ICD-9-CM: 327.23  2/7/2019 Yes Overview Signed 2/7/2019 10:49 AM by Carlitos Beltran MD  
  Not using CPAP See Dr. Dali Randhawa at [facility] * (Principal) Acute on chronic respiratory failure with hypoxia (HCC) ICD-10-CM: J96.21 
ICD-9-CM: 518.84, 799.02  2/6/2019 Yes Volume OL doing well with diuresis Cellulitis ICD-10-CM: L03.90 ICD-9-CM: 682.9  2/6/2019 Yes Per primary COPD exacerbation ([facility] ICD-10-CM: J44.1 ICD-9-CM: 491.21  2/6/2019 Yes Better ccRx Anemia ICD-10-CM: D64.9 ICD-9-CM: 285.9  2/6/2019 Yes Pulmonary infiltrate in right lung on chest x-ray ICD-10-CM: R91.8 ICD-9-CM: 793.19  2/6/2019 Yes On zithromax and rocephine- can stop rocephin and  Continue zithromax po for a total of 7 days Severe [facility] ICD-10-CM: E66.01 
ICD-9-CM: 278.01  1/24/2019 Yes Stage 4 chronic kidney disease ([facility] ICD-10-CM: N18.4 ICD-9-CM: 585.4  1/24/2019 Yes Per nephrology --finish Zpack course 
--back on home lasix per nephrology 
--O2 weaned to near baseline 
--Continue nebs --change steroids to PO, 40mg daily taper over 2 weeks 
--wean O2 
--PT, mobility 
--will need follow up after discharge with S pulmonary Will sign off, call with questions or patient decline.  
 
More than 50% of time documented was spent in face-to-face contact with the patient and in the care of the patient on the floor/unit where the patient is located. Alexandr Low MD

## 2019-02-12 NOTE — PROGRESS NOTES
Care Management Interventions Mode of Transport at Discharge: Self Transition of Care Consult (CM Consult): Home Health [facility] [place] - INPATIENT: No 
Reason Outside [place]: Patient already serviced by other home care/hospice agency Physical Therapy Consult: Yes Occupational Therapy Consult: Yes 
Confirm Follow Up Transport: Self Plan discussed with Pt/Family/Caregiver: Yes Discharge Location Discharge Placement: Home with home health  faxed clinical & resume [facility] orders to [facility] .

## 2019-02-12 NOTE — PROGRESS NOTES
Shift assessment completed via doc flow sheet. Patient is alert and oriented x 4. Respirations even and unlabored on high flow O2. Lung sounds diminished. Heart sounds S1, S2 auscultated and regular. Left arm fistula positive for bruit and thrill. Abdomen obese and non tender. Bowel sounds hypoactive to all 4 quadrants. Patient ambulates in room as needed with assistance. Patient denies pain and other needs at this time. Bed is locked and in low position. Bed rails x 3. Patient is encouraged to call for assistance. Call light within reach.

## 2019-02-13 VITALS
OXYGEN SATURATION: 90 % | HEIGHT: 62 IN | RESPIRATION RATE: 20 BRPM | DIASTOLIC BLOOD PRESSURE: 62 MMHG | SYSTOLIC BLOOD PRESSURE: 141 MMHG | WEIGHT: 244.4 LBS | BODY MASS INDEX: 44.98 KG/M2 | TEMPERATURE: 96.2 F | HEART RATE: 67 BPM

## 2019-02-13 PROBLEM — J96.21 ACUTE ON CHRONIC RESPIRATORY FAILURE WITH HYPOXIA (HCC): Status: RESOLVED | Noted: 2019-02-06 | Resolved: 2019-02-13

## 2019-02-13 LAB
GLUCOSE BLD STRIP.AUTO-MCNC: 195 MG/DL (ref 65–100)
GLUCOSE BLD STRIP.AUTO-MCNC: 81 MG/DL (ref 65–100)

## 2019-02-13 PROCEDURE — 74011250637 HC RX REV CODE- 250/637: Performed by: INTERNAL MEDICINE

## 2019-02-13 PROCEDURE — 74011000250 HC RX REV CODE- 250: Performed by: INTERNAL MEDICINE

## 2019-02-13 PROCEDURE — 74011250637 HC RX REV CODE- 250/637: Performed by: HOSPITALIST

## 2019-02-13 PROCEDURE — 74011636637 HC RX REV CODE- 636/637: Performed by: HOSPITALIST

## 2019-02-13 PROCEDURE — 82962 GLUCOSE BLOOD TEST: CPT

## 2019-02-13 PROCEDURE — 94640 AIRWAY INHALATION TREATMENT: CPT

## 2019-02-13 PROCEDURE — 94760 N-INVAS EAR/PLS OXIMETRY 1: CPT

## 2019-02-13 PROCEDURE — 77010033678 HC OXYGEN DAILY

## 2019-02-13 PROCEDURE — 74011636637 HC RX REV CODE- 636/637: Performed by: INTERNAL MEDICINE

## 2019-02-13 RX ORDER — PREDNISONE 10 MG/1
10 TABLET ORAL SEE ADMIN INSTRUCTIONS
Qty: 30 TAB | Refills: 0 | Status: SHIPPED | OUTPATIENT
Start: 2019-02-13

## 2019-02-13 RX ORDER — CARVEDILOL 6.25 MG/1
6.25 TABLET ORAL 2 TIMES DAILY WITH MEALS
Qty: 60 TAB | Refills: 0 | Status: SHIPPED | OUTPATIENT
Start: 2019-02-13

## 2019-02-13 RX ORDER — CALCIUM ACETATE 667 MG/1
2 CAPSULE ORAL
Qty: 90 CAP | Refills: 0 | Status: SHIPPED | OUTPATIENT
Start: 2019-02-13

## 2019-02-13 RX ORDER — AZITHROMYCIN 500 MG/1
500 TABLET, FILM COATED ORAL DAILY
Qty: 1 TAB | Refills: 0 | Status: SHIPPED | OUTPATIENT
Start: 2019-02-13

## 2019-02-13 RX ORDER — INSULIN GLARGINE 100 [IU]/ML
35 INJECTION, SOLUTION SUBCUTANEOUS
Qty: 4 PEN | Refills: 0 | Status: SHIPPED | OUTPATIENT
Start: 2019-02-13

## 2019-02-13 RX ORDER — INSULIN LISPRO 100 [IU]/ML
10 INJECTION, SOLUTION INTRAVENOUS; SUBCUTANEOUS
Qty: 3 PEN | Refills: 0 | Status: SHIPPED | OUTPATIENT
Start: 2019-02-13

## 2019-02-13 RX ADMIN — PANTOPRAZOLE SODIUM 40 MG: 40 TABLET, DELAYED RELEASE ORAL at 08:17

## 2019-02-13 RX ADMIN — FERROUS SULFATE TAB 325 MG (65 MG ELEMENTAL FE) 325 MG: 325 (65 FE) TAB at 08:17

## 2019-02-13 RX ADMIN — PREDNISONE 40 MG: 20 TABLET ORAL at 08:18

## 2019-02-13 RX ADMIN — Medication 5 ML: at 05:42

## 2019-02-13 RX ADMIN — BUDESONIDE 500 MCG: 0.5 INHALANT RESPIRATORY (INHALATION) at 07:57

## 2019-02-13 RX ADMIN — CARVEDILOL 6.25 MG: 6.25 TABLET, FILM COATED ORAL at 08:17

## 2019-02-13 RX ADMIN — LORATADINE 10 MG: 10 TABLET ORAL at 08:17

## 2019-02-13 RX ADMIN — IPRATROPIUM BROMIDE AND ALBUTEROL SULFATE 3 ML: .5; 3 SOLUTION RESPIRATORY (INHALATION) at 02:36

## 2019-02-13 RX ADMIN — FLUTICASONE PROPIONATE 2 SPRAY: 50 SPRAY, METERED NASAL at 08:20

## 2019-02-13 RX ADMIN — FUROSEMIDE 40 MG: 40 TABLET ORAL at 08:18

## 2019-02-13 RX ADMIN — POTASSIUM CHLORIDE 10 MEQ: 10 TABLET, EXTENDED RELEASE ORAL at 08:17

## 2019-02-13 RX ADMIN — INSULIN GLARGINE 35 UNITS: 100 INJECTION, SOLUTION SUBCUTANEOUS at 08:19

## 2019-02-13 RX ADMIN — INSULIN LISPRO 10 UNITS: 100 INJECTION, SOLUTION INTRAVENOUS; SUBCUTANEOUS at 08:20

## 2019-02-13 RX ADMIN — CALCIUM ACETATE 1334 MG: 667 CAPSULE ORAL at 08:18

## 2019-02-13 RX ADMIN — ASPIRIN 81 MG: 81 TABLET, DELAYED RELEASE ORAL at 08:18

## 2019-02-13 RX ADMIN — AZITHROMYCIN 500 MG: 250 TABLET, FILM COATED ORAL at 08:17

## 2019-02-13 RX ADMIN — APIXABAN 2.5 MG: 2.5 TABLET, FILM COATED ORAL at 08:17

## 2019-02-13 RX ADMIN — AMIODARONE HYDROCHLORIDE 200 MG: 200 TABLET ORAL at 08:18

## 2019-02-13 RX ADMIN — ALLOPURINOL 100 MG: 100 TABLET ORAL at 08:17

## 2019-02-13 RX ADMIN — IPRATROPIUM BROMIDE AND ALBUTEROL SULFATE 3 ML: .5; 3 SOLUTION RESPIRATORY (INHALATION) at 07:57

## 2019-02-13 RX ADMIN — GUAIFENESIN 600 MG: 600 TABLET, EXTENDED RELEASE ORAL at 08:18

## 2019-02-13 NOTE — DISCHARGE INSTRUCTIONS
Disposition: home with home health. Activity: Activity as tolerated  Diet: DIET DIABETIC CONSISTENT CARB Regular  DIET NUTRITIONAL SUPPLEMENTS All Meals; [place]      DISCHARGE SUMMARY from Nurse    PATIENT INSTRUCTIONS:    After general anesthesia or intravenous sedation, for 24 hours or while taking prescription Narcotics:  · Limit your activities  · Do not drive and operate hazardous machinery  · Do not make important personal or business decisions  · Do  not drink alcoholic beverages  · If you have not urinated within 8 hours after discharge, please contact your surgeon on call. Report the following to your surgeon:  · Excessive pain, swelling, redness or odor of or around the surgical area  · Temperature over 100.5  · Nausea and vomiting lasting longer than 4 hours or if unable to take medications  · Any signs of decreased circulation or nerve impairment to extremity: change in color, persistent  numbness, tingling, coldness or increase pain  · Any questions    What to do at Home:  Recommended activity: Activity as tolerated, see above instructions     If you experience any of the following symptoms worsening shortness of breath, chest pain, uncontrolled BS, other concerns, please follow up with PCP. *  Please give a list of your current medications to your Primary Care Provider. *  Please update this list whenever your medications are discontinued, doses are      changed, or new medications (including over-the-counter products) are added. *  Please carry medication information at all times in case of emergency situations. These are general instructions for a healthy lifestyle:    No smoking/ No tobacco products/ Avoid exposure to second hand smoke  Surgeon General's Warning:  Quitting smoking now greatly reduces serious risk to your health.     Obesity, smoking, and sedentary lifestyle greatly increases your risk for illness    A healthy diet, regular physical exercise & weight monitoring are important for maintaining a healthy lifestyle    You may be retaining fluid if you have a history of heart failure or if you experience any of the following symptoms:  Weight gain of 3 pounds or more overnight or 5 pounds in a week, increased swelling in our hands or feet or shortness of breath while lying flat in bed. Please call your doctor as soon as you notice any of these symptoms; do not wait until your next office visit. Recognize signs and symptoms of STROKE:    F-face looks uneven    A-arms unable to move or move unevenly    S-speech slurred or non-existent    T-time-call 911 as soon as signs and symptoms begin-DO NOT go       Back to bed or wait to see if you get better-TIME IS BRAIN. Warning Signs of HEART ATTACK     Call 911 if you have these symptoms:   Chest discomfort. Most heart attacks involve discomfort in the center of the chest that lasts more than a few minutes, or that goes away and comes back. It can feel like uncomfortable pressure, squeezing, fullness, or pain.  Discomfort in other areas of the upper body. Symptoms can include pain or discomfort in one or both arms, the back, neck, jaw, or stomach.  Shortness of breath with or without chest discomfort.  Other signs may include breaking out in a cold sweat, nausea, or lightheadedness. Don't wait more than five minutes to call 911 - MINUTES MATTER! Fast action can save your life. Calling 911 is almost always the fastest way to get lifesaving treatment. Emergency Medical Services staff can begin treatment when they arrive -- up to an hour sooner than if someone gets to the hospital by car. The discharge information has been reviewed with the patient. The patient verbalized understanding.   Discharge medications reviewed with the patient and appropriate educational materials and side effects teaching were provided. ___________________________________________________________________________________________________________________________________  Giving a Single-Dose Insulin Shot: Care Instructions  Your Care Instructions    Insulin is normally made by the pancreas, a gland behind the stomach. In people with diabetes, the pancreas no longer makes enough insulin or it stops making it. Without insulin, your blood sugar level rises to dangerous levels. When this happens, you need insulin shots to keep your blood sugar in your target range. You may be nervous giving a shot at first. But soon, giving yourself a shot will become routine. It is quite easy to learn how to draw up insulin into a syringe and give the shot. The needles you use to give the insulin injections are very thin, and most people who have diabetes say they do not even feel the needle enter the skin. Even if you do feel the injection, the sting of the shot is not bad and does not last long. More than half a million people do it every day. You can too. Follow-up care is a key part of your treatment and safety. Be sure to make and go to all appointments, and call your doctor if you are having problems. It's also a good idea to know your test results and keep a list of the medicines you take. How can you care for yourself at home? Getting started  If you have poor eyesight, have problems using your hands, or cannot prepare a dose of insulin, you may need someone to prepare your insulin injections ahead of time. · Gather your supplies. You will need an insulin syringe, your bottle of insulin, and an alcohol wipe or a cotton ball dipped in alcohol. Keep your supplies in a bag or kit so you can carry the supplies wherever you go. · Check the insulin bottle label and contents. Read and follow all instructions on the label, including how to store the insulin and how long the insulin will last.  · Wash your hands with soap and running water.  Dry them well.  Preparing the shot  For a single type of insulin shot:  1. Roll the bottle gently between your hands. This will warm the insulin if you have kept the bottle in the refrigerator. Roll a bottle of cloudy insulin between your hands until the white powder has dissolved and the solution is mixed. 2. Wipe the rubber lid of the insulin bottle with an alcohol wipe or a cotton ball dipped in alcohol. (If you are using a bottle for the first time, remove the protective cover over the rubber lid.) Let the top dry before you remove any insulin. 3. Remove the plastic cap from the needle on your insulin syringe. Take care not to touch the needle. 4. Pull the plunger of the syringe back, and draw air into the syringe equal to the number of units of insulin to be given. 5. Insert the needle of the syringe into the rubber lid of the insulin bottle. Push the plunger of the syringe to force the air into the bottle. This equalizes the pressure in the bottle when you remove the dose of insulin. Leave the needle in the bottle. 6. Turn the bottle and syringe upside down, and hold them in one hand. Position the tip of the needle so that it is below the surface of insulin in the bottle. Pull back the plunger to fill the syringe with slightly more than the correct number of units of insulin to be given. 7. Tap the outside (barrel) of the syringe so that trapped air bubbles move into the needle area. Push the air bubbles back into the bottle. Make sure you now have the correct number of units of insulin in your syringe. 8. Remove the needle from the bottle. Now you are ready to give the shot. Giving the shot  Before giving your shot:  1. Use alcohol to clean the skin before you give the shot. Let it dry. 2. Slightly pinch a fold of skin between your fingers and thumb of one hand. 3. Hold the syringe like a pencil close to the site, keeping your fingers off the plunger.  It is usually recommended to place the syringe at a 90-degree angle to the shot site, standing straight up from the skin. 4. Bend your wrist, and quickly push the needle all the way into the pinched-up area. 5. Push the plunger of the syringe all the way in so the insulin goes into the fatty tissue. 6. Take the needle out at the same angle that you inserted it. If you bleed a little, apply pressure over the shot area with your finger, a cotton ball, or a piece of gauze. Do not rub the area. 7. Replace the cover over the needle and dispose of the needle safely. Do not use the same needle more than one time. Where to give the shot  You can inject insulin into:  · The belly, but at least 2 inches from the belly button. This is considered the best place to inject insulin. · The top outer part of the thighs. Insulin usually is absorbed more slowly from this site, unless you exercise soon after giving the shot. · The outside of the upper arms. You may need help giving yourself shots in this area. · The buttocks. You may need help with injections in the buttocks. Your doctor may advise you to give your shots in different places on your body each day. This is called site rotation. If you are going to rotate sites, check with your doctor to make sure you know how to do it right. Use the same site at the same time of each day. For example, each day:  · At breakfast, give the shot in one of your arms. · At lunch, give the shot in one of your legs. · At dinner, give the shot in your belly. Slightly change the spot where you give an insulin shot each time you do it. For example, use five different places on the right upper arm, then use five places on the left upper arm. Using the same spot every time can cause bumps or pits in the skin and make the shots hurt more. It may also slow down how the insulin is absorbed into your body. Where can you learn more? Go to http://liber[employer]isco.info/.   Enter I899 in the search box to learn more about \"Giving a Single-Dose Insulin Shot: Care Instructions. \"  Current as of: July 25, 2018  Content Version: 11.9  © 3668-3373 [employer]. Care instructions adapted under license by [employer] (which disclaims liability or warranty for this information). If you have questions about a medical condition or this instruction, always ask your healthcare professional. [address] any warranty or liability for your use of this information. Patient Education        Cellulitis: Care Instructions  Your Care Instructions    Cellulitis is a skin infection caused by bacteria, most often strep or staph. It often occurs after a break in the skin from a scrape, cut, bite, or puncture, or after a rash. Cellulitis may be treated without doing tests to find out what caused it. But your doctor may do tests, if needed, to look for a specific bacteria, like methicillin-resistant Staphylococcus aureus (MRSA). The doctor has checked you carefully, but problems can develop later. If you notice any problems or new symptoms, get medical treatment right away. Follow-up care is a key part of your treatment and safety. Be sure to make and go to all appointments, and call your doctor if you are having problems. It's also a good idea to know your test results and keep a list of the medicines you take. How can you care for yourself at home? · Take your antibiotics as directed. Do not stop taking them just because you feel better. You need to take the full course of antibiotics. · Prop up the infected area on pillows to reduce pain and swelling. Try to keep the area above the level of your heart as often as you can. · If your doctor told you how to care for your wound, follow your doctor's instructions. If you did not get instructions, follow this general advice:  ? Wash the wound with clean water 2 times a day. Don't use hydrogen peroxide or alcohol, which can slow healing.   ? You may cover the wound with a thin layer of petroleum jelly, such as Vaseline, and a nonstick bandage. ? Apply more petroleum jelly and replace the bandage as needed. · Be safe with medicines. Take pain medicines exactly as directed. ? If the doctor gave you a prescription medicine for pain, take it as prescribed. ? If you are not taking a prescription pain medicine, ask your doctor if you can take an over-the-counter medicine. To prevent cellulitis in the future  · Try to prevent cuts, scrapes, or other injuries to your skin. Cellulitis most often occurs where there is a break in the skin. · If you get a scrape, cut, mild burn, or bite, wash the wound with clean water as soon as you can to help avoid infection. Don't use hydrogen peroxide or alcohol, which can slow healing. · If you have swelling in your legs (edema), support stockings and good skin care may help prevent leg sores and cellulitis. · Take care of your feet, especially if you have diabetes or other conditions that increase the risk of infection. Wear shoes and socks. Do not go barefoot. If you have athlete's foot or other skin problems on your feet, talk to your doctor about how to treat them. When should you call for help? Call your doctor now or seek immediate medical care if:    · You have signs that your infection is getting worse, such as:  ? Increased pain, swelling, warmth, or redness. ? Red streaks leading from the area. ? Pus draining from the area. ? A fever.     · You get a rash.    Watch closely for changes in your health, and be sure to contact your doctor if:    · You do not get better as expected. Where can you learn more? Go to http://liberty-[employer].info/. Shashi Campo in the search box to learn more about \"Cellulitis: Care Instructions. \"  Current as of: April 17, 2018  Content Version: 11.9  © 4432-2539 [employer].  Care instructions adapted under license by [employer] (which disclaims liability or warranty for this information). If you have questions about a medical condition or this instruction, always ask your healthcare professional. [place] [address] any warranty or liability for your use of this information.

## 2019-02-13 NOTE — DISCHARGE SUMMARY
Hospitalist Discharge Summary     Admit Date:  2019  9:59 AM   Name:  Shabbir Santillan   Age:  82 y.o.  :  1935   MRN:  [id number]   PCP:  Emeka Gorman MD  Treatment Team: Attending Provider: Carolina Davis MD; Care Manager: Manjeet Perez; Utilization Review: Sabas Ko, RN; Utilization Review: Doris Mao RN; Consulting Provider: David Palomino MD    Problem List for this Hospitalization:  Hospital Problems as of 2019 Date Reviewed: 2019          Codes Class Noted - Resolved POA    Uncontrolled type 2 diabetes mellitus with hyperglycemia ([facility] ICD-10-CM: E11.65  ICD-9-CM: 250.02  2019 - Present Yes        Anemia of renal disease ICD-10-CM: D63.1  ICD-9-CM: 285.21  2019 - Present Yes        WILLIAM (obstructive sleep apnea) ICD-10-CM: G47.33  ICD-9-CM: 327.23  2019 - Present Yes    Overview Signed 2019 10:49 AM by Hemal Chowdary MD     Not using CPAP  See Dr. Marylee Awkward at [facility]             Cellulitis ICD-10-CM: L03.90  ICD-9-CM: 682.9  2019 - Present Yes        COPD exacerbation ([facility] ICD-10-CM: J44.1  ICD-9-CM: 491.21  2019 - Present Yes        Anemia ICD-10-CM: D64.9  ICD-9-CM: 285.9  2019 - Present Yes        Pulmonary infiltrate in right lung on chest x-ray ICD-10-CM: R91.8  ICD-9-CM: 793.19  2019 - Present Yes        Severe obesity ([facility] ICD-10-CM: E66.01  ICD-9-CM: 278.01  2019 - Present Yes        Stage 4 chronic kidney disease ([facility] ICD-10-CM: N18.4  ICD-9-CM: 585.4  2019 - Present Yes        * (Principal) RESOLVED: Acute on chronic respiratory failure with hypoxia ([facility] ICD-10-CM: J96.21  ICD-9-CM: 518.84, 799.02  2019 - 2019 Yes              Hospital Course:  Mrs. Mark Pang is an 81 y/o WF with a h/o CAD s/p CABG, COPD, WILLIAM (not using CPAP), CHF, AFib, CKD and recent AVF placement on 2019. She presented to [facility] ED on  with cough, wheezing and fever/chills.  Her baseline O2 requirements are 2.5L/min and she was hypoxic on this at home. Her LUE was red and swollen on admission as well. CXR showed RLL infiltrate. She was started on broad spectrum abx for pneumonia and suspected LUE AVF cellulitis. Vascular surgery was consulted and felt her graft was not infected. Antibiotics were changed to cover CAP only. She developed increased oxygen requirements (Airvo) that prompted initiation of IV steroids and pulmonology evaluation. Steroids caused derangements in her blood sugars so basal/bolus insulin was started with good results. She does not take insulin at home but will need this at discharge. Steroids were changed to oral and her oxygen was weaned back to her home dose via NC. Nephrology was consulted and patient was started on PhosLo. Diltiazem was changed to carvedilol. She will be discharged on a steroid taper and will need to continue insulin at least during this time period. She is currently getting 35U Lantus at night and 10U Humalog with each meal. These Rxs have been given to hear. I called her PCP's office and scheduled her an appointment for 2/15 at 10:50 AM for sugar follow up. Patient says she has family members who know how to administer insulin and will be able to help her. She has glucometer, strips and lancets at home already. Follow up instructions and discharge meds at bottom of this note. Plan was discussed with patient, nursing. All questions answered. Patient was stable at time of discharge. Diagnostic Imaging/Tests:   Xr Chest Port    Result Date: 2/6/2019  CHEST X-RAY, one view. HISTORY:  Worsening shortness breath and cough. TECHNIQUE:  AP upright upper view. COMPARISON: None. FINDINGS:  The lungs demonstrate some bibasilar densities at the right, atelectasis or infiltrate. . The heart size is enlarged. The costophrenic angles are sharp. The pulmonary vasculature is unremarkable. Included portion of the upper abdomen is unremarkable.   There are sternal wires, some of which are probably fractured. IMPRESSION:  Borderline cardiomegaly with some atelectasis or infiltrate right base. Echocardiogram results:  Results for orders placed or performed during the hospital encounter of 19   2D ECHO COMPLETE ADULT (TTE) [address]  [address] [facility], [address] [facility]  [phone]    Transthoracic Echocardiogram  2D, M-mode, Doppler, and Color Doppler    Patient: Cynthia Reyes  MR #: [id number]  : 1935  Age: 82 years  Gender: Female  Study date: 2019  Account #: [de-identified]  Height: 62 in  Weight: 239.6 lb  BSA: 2.07 mï¾²  Status:Routine  Location:  340  BP: 153/ 65    Allergies: ALPRAZOLAM, MEPERIDINE, LEVOFLOXACIN, OXYCODONE, FAMOTIDINE,  PROMETHAZINE, SITAGLIPTIN, SULFA (SULFONAMIDE ANTIBIOTICS)    Sonographer:  Carolina Overton RDCS  Group:  [facility]  Referring Physician:  Nirmal Lockwood MD  Reading Physician:  Jolanta Aguila MD [facility] [place]    INDICATIONS: Pulm Edema due to chronic heart failure; effect of AV graft  placement? PROCEDURE: This was a routine study. A transthoracic echocardiogram was  performed. The study included complete 2D imaging, M-mode, complete spectral  Doppler, and color Doppler. Intravenous contrast (Definity) was administered. Image quality was adequate. LEFT VENTRICLE: Size was at the upper limits of normal. Systolic function was  normal. Ejection fraction was estimated in the range of 55 % to 60 %. There  were no regional wall motion abnormalities. There was mild concentric  hypertrophy. The E/e' ratio was 28.5. There was diastolic dysfunction. RIGHT VENTRICLE: The size was normal. Systolic function was normal. Estimated  peak pressure was in the range of 55-60 mmHg. LEFT ATRIUM: The atrium was mildly dilated. RIGHT ATRIUM: The atrium was mildly dilated. SYSTEMIC VEINS: IVC: The inferior vena cava was mildly dilated.  The  respirophasic change in diameter was more than 50%. AORTIC VALVE: The valve was structurally normal, tri-commissural. There was   no  evidence for stenosis. There was no insufficiency. MITRAL VALVE: There was mild annular calcification. Transmitral velocity was  minimally increased. There was no evidence for stenosis. There was mild to  moderate regurgitation. TRICUSPID VALVE: The valve structure was normal. There was no evidence for  stenosis. There was mild regurgitation. PULMONIC VALVE: The valve structure was normal. There was no evidence for  stenosis. There was trivial regurgitation. PERICARDIUM: There was no pericardial effusion. AORTA: The root exhibited normal size. SUMMARY:    -  Left ventricle: Size was at the upper limits of normal. Systolic function  was normal. Ejection fraction was estimated in the range of 55 % to 60 %. There  were no regional wall motion abnormalities. There was mild concentric  hypertrophy. The E/e' ratio was 28.5. There was diastolic dysfunction. -  Left atrium: The atrium was mildly dilated. -  Right atrium: The atrium was mildly dilated. -  Inferior vena cava, hepatic veins: The inferior vena cava was mildly  dilated. The respirophasic change in diameter was more than 50%. -  Mitral valve: There was mild annular calcification. There was mild to  moderate regurgitation.    -  Tricuspid valve: There was mild regurgitation. SYSTEM MEASUREMENT TABLES    2D mode  AoR Diam (2D): 2.9 cm  LA Dimension (2D): 4.1 cm  Left Atrium Systolic Volume Index; Method of Disks, Biplane; 2D mode;: 37.7  ml/m2  IVS/LVPW (2D): 1.1  IVSd (2D): 1.4 cm  LVIDd (2D): 5.5 cm  LVIDs (2D): 3.5 cm  LVOT Area (2D): 2.8 cm2  LVPWd (2D): 1.2 cm  RVIDd (2D): 3.4 cm    Tissue Doppler Imaging  LV Peak Early Grider Tissue Efren; Lateral MA (TDI): 6.8 cm/s  LV Peak Early Griedr Tissue Efren; Medial MA (TDI): 4.6 cm/s    Unspecified Scan Mode  Peak Grad; Mean; Antegrade Flow: 15 mm[Hg]  Vmax;  Antegrade Flow: 195 cm/s  LVOT Diam: 1.9 cm  MV Peak Efren/LV Peak Tissue Efren E-Wave; Lateral MA: 23  MV Peak Efren/LV Peak Tissue Efren E-Wave; Medial MA: 34  RVSP: 51 mm[Hg]  Vmax; Regurgitant Flow: 347 cm/s    Prepared and signed by    Loc Simons MD [facility] [place]  Signed 11-Feb-2019 18:03:53         Procedures done this admission:  * No surgery found *    All Micro Results     Procedure Component Value Units Date/Time    CULTURE, BLOOD [[id number]] Collected:  02/06/19 1041    Order Status:  Completed Specimen:  Whole Blood Updated:  02/11/19 1004     Special Requests: --        RIGHT  HAND       Culture result: NO GROWTH 5 DAYS       CULTURE, BLOOD [[id number]] Collected:  02/06/19 1041    Order Status:  Completed Specimen:  Whole Blood Updated:  02/11/19 1004     Special Requests: --        RIGHT  ARM       Culture result: NO GROWTH 5 DAYS       INFLUENZA A & B AG (RAPID TEST) [[id number]] Collected:  02/07/19 0059    Order Status:  Completed Specimen:  Nasopharyngeal from Nasal washing Updated:  02/07/19 0121     Influenza A Ag NEGATIVE         Comment: NEGATIVE FOR THE PRESENCE OF INFLUENZA A ANTIGEN  INFECTION DUE TO INFLUENZA A CANNOT BE RULED OUT. BECAUSE THE ANTIGEN PRESENT IN THE SAMPLE MAY BE BELOW  THE DETECTION LIMIT OF THE TEST. A NEGATIVE TEST IS PRESUMPTIVE AND IT IS RECOMMENDED THAT THESE RESULTS BE CONFIRMED BY VIRAL CULTURE OR AN FDA-CLEARED INFLUENZA A AND B MOLECULAR ASSAY. Influenza B Ag NEGATIVE         Comment: NEGATIVE FOR THE PRESENCE OF INFLUENZA B ANTIGEN  INFECTION DUE TO INFLUENZA B CANNOT BE RULED OUT. BECAUSE THE ANTIGEN PRESENT IN THE SAMPLE MAY BE BELOW  THE DETECTION LIMIT OF THE TEST. A NEGATIVE TEST IS PRESUMPTIVE AND IT IS RECOMMENDED THAT THESE RESULTS BE CONFIRMED BY VIRAL CULTURE OR AN FDA-CLEARED INFLUENZA A AND B MOLECULAR ASSAY.           Source NASOPHARYNGEAL       CULTURE, RESPIRATORY/SPUTUM/BRONCH Gurinder Gores [[id number]] Collected:  02/06/19 1645    Order Status:  Canceled Specimen:  Sputum           Labs: Results:       BMP, Mg, Phos Recent Labs     02/12/19  0625      K 4.1   CL 97*   CO2 30   AGAP 10   *   CREA 3.71*   CA 9.1   *   MG 2.8*      CBC Recent Labs     02/12/19  0625   WBC 17.3*   RBC 3.92*   HGB 11.4*   HCT 35.3*      GRANS 88*   LYMPH 7*   EOS 0*   MONOS 3*   BASOS 0   IG 2   ANEU 15.2*   ABL 1.3   HILDA 0.0   ABM 0.5   ABB 0.1   AIG 0.3      LFT Recent Labs     02/12/19  0625   SGOT 22   ALT 58      TP 6.8   ALB 3.2   GLOB 3.6*   AGRAT 0.9      Cardiac Testing Lab Results   Component Value Date/Time     02/06/2019 10:19 AM    Troponin-I, Qt. <0.02 (L) 02/06/2019 10:19 AM      Coagulation Tests No results found for: PTP, INR, APTT   A1c Lab Results   Component Value Date/Time    Hemoglobin A1c 7.8 02/06/2019 08:11 PM      Lipid Panel No results found for: CHOL, CHOLPOCT, CHOLX, CHLST, CHOLV, 109909, HDL, LDL, LDLC, DLDLP, 509278, VLDLC, VLDL, TGLX, TRIGL, TRIGP, TGLPOCT, CHHD, CHHDX   Thyroid Panel No results found for: TSH, T4, FT4, TT3, T3U, TSHEXT     Most Recent UA No results found for: COLOR, APPRN, REFSG, JULI, PROTU, GLUCU, KETU, BILU, BLDU, UROU, LINK, LEUKU, WBCU, RBCU, UEPI, BACTU, CASTS, UCRY, MUCUS, UCOM     Allergies   Allergen Reactions    Alprazolam Drowsiness    Demerol [Meperidine] Unknown (comments)    Levaquin [Levofloxacin] Rash    Oxycodone Unknown (comments) and Nausea and Vomiting    Pepcid [Famotidine] Nausea and Vomiting    Phenergan [Promethazine] Unknown (comments) and Other (comments)     Turns red and swells    Sitagliptin Unknown (comments)    Sulfa (Sulfonamide Antibiotics) Nausea and Vomiting       There is no immunization history on file for this patient.     All Labs from Last 24 Hrs:  Recent Results (from the past 24 hour(s))   GLUCOSE, POC    Collection Time: 02/12/19 11:07 AM   Result Value Ref Range    Glucose (POC) 239 (H) 65 - 100 mg/dL   GLUCOSE, POC    Collection Time: 02/12/19  3:57 PM   Result Value Ref Range    Glucose (POC) 213 (H) 65 - 100 mg/dL   GLUCOSE, POC    Collection Time: 02/12/19  9:16 PM   Result Value Ref Range    Glucose (POC) 167 (H) 65 - 100 mg/dL   GLUCOSE, POC    Collection Time: 02/13/19  8:10 AM   Result Value Ref Range    Glucose (POC) 81 65 - 100 mg/dL       Current Med List in Hospital:   Current Facility-Administered Medications   Medication Dose Route Frequency    predniSONE (DELTASONE) tablet 40 mg  40 mg Oral DAILY WITH BREAKFAST    insulin glargine (LANTUS) injection 35 Units  35 Units SubCUTAneous DAILY    albuterol-ipratropium (DUO-NEB) 2.5 MG-0.5 MG/3 ML  3 mL Nebulization Q6H RT    polyethylene glycol (MIRALAX) packet 17 g  17 g Oral DAILY PRN    ferrous sulfate tablet 325 mg  1 Tab Oral TID WITH MEALS    calcium acetate (PHOSLO) capsule 1,334 mg  2 Cap Oral TID WITH MEALS    insulin lispro (HUMALOG) injection 10 Units  10 Units SubCUTAneous TIDAC    carvedilol (COREG) tablet 6.25 mg  6.25 mg Oral BID WITH MEALS    azithromycin (ZITHROMAX) tablet 500 mg  500 mg Oral DAILY    0.9% sodium chloride infusion 250 mL  250 mL IntraVENous PRN    0.9% sodium chloride infusion 250 mL  250 mL IntraVENous PRN    hydrALAZINE (APRESOLINE) 20 mg/mL injection 20 mg  20 mg IntraVENous Q6H PRN    allopurinol (ZYLOPRIM) tablet 100 mg  100 mg Oral DAILY    amiodarone (CORDARONE) tablet 200 mg  200 mg Oral DAILY    apixaban (ELIQUIS) tablet 2.5 mg  2.5 mg Oral Q12H    aspirin delayed-release tablet 81 mg  81 mg Oral DAILY    loratadine (CLARITIN) tablet 10 mg  10 mg Oral DAILY    fluticasone (FLONASE) 50 mcg/actuation nasal spray 2 Spray  2 Spray Both Nostrils DAILY    furosemide (LASIX) tablet 40 mg  40 mg Oral DAILY    budesonide (PULMICORT) 500 mcg/2 ml nebulizer suspension  500 mcg Nebulization BID RT    insulin lispro (HUMALOG) injection   SubCUTAneous AC&HS    LORazepam (ATIVAN) tablet 0.5 mg  0.5 mg Oral TID PRN    montelukast (SINGULAIR) tablet 10 mg 10 mg Oral QHS    pantoprazole (PROTONIX) tablet 40 mg  40 mg Oral ACB    potassium chloride (KLOR-CON) tablet 10 mEq  10 mEq Oral DAILY    traMADol (ULTRAM) tablet 50 mg  50 mg Oral Q8H PRN    guaiFENesin ER (MUCINEX) tablet 600 mg  600 mg Oral Q12H    sodium chloride (NS) flush 5-40 mL  5-40 mL IntraVENous Q8H    sodium chloride (NS) flush 5-40 mL  5-40 mL IntraVENous PRN    atorvastatin (LIPITOR) tablet 40 mg  40 mg Oral QHS       Discharge Exam:  Patient Vitals for the past 24 hrs:   Temp Pulse Resp BP SpO2   02/13/19 0757     93 %   02/13/19 0744 96.2 °F (35.7 °C) 66 20 151/64 90 %   02/13/19 0259 98.4 °F (36.9 °C) 73 16 150/68 95 %   02/13/19 0240     99 %   02/12/19 2303 97.4 °F (36.3 °C) 69 16 155/61 92 %   02/12/19 2042     93 %   02/12/19 1910 98.4 °F (36.9 °C) 73 20 178/64 94 %   02/12/19 1558 97.5 °F (36.4 °C) 72 25 157/68 91 %   02/12/19 1429     94 %   02/12/19 1141 97.1 °F (36.2 °C) 72 25 156/58 91 %   02/12/19 0912     97 %     Oxygen Therapy  O2 Sat (%): 93 % (02/13/19 0757)  Pulse via Oximetry: 67 beats per minute (02/13/19 0757)  O2 Device: Nasal cannula (02/13/19 0757)  O2 Flow Rate (L/min): 2.5 l/min(weaned from 3L) (02/13/19 0757)  O2 Temperature: (no value) (02/11/19 1302)  FIO2 (%): 32 % (02/12/19 1429)    Intake/Output Summary (Last 24 hours) at 2/13/2019 0856  Last data filed at 2/13/2019 0130  Gross per 24 hour   Intake    Output 1600 ml   Net -1600 ml       *Note that automatically entered I/Os may not be accurate; dependent on patient compliance with collection and accurate  by assistants. General:    Well nourished. Alert. Obese. Eyes:   Normal sclera. Extraocular movements intact. ENT:  Normocephalic, atraumatic. Moist mucous membranes  CV:   Regular rate and rhythm. No murmur, rub, or gallop. Lungs:  Clear to auscultation bilaterally. No wheezing, rhonchi, or rales. Abdomen: Soft, nontender, nondistended.  Bowel sounds normal. Extremities: Warm and dry. No cyanosis or edema. LUE AVF with palpable/audible bruit. Incisions healing well. Neurologic: CN II-XII grossly intact. Sensation intact. Skin:     No rashes or jaundice. Psych:  Normal mood and affect. Discharge Info:   Current Discharge Medication List      START taking these medications    Details   calcium acetate (PHOSLO) 667 mg cap Take 2 Caps by mouth three (3) times daily (with meals). Qty: 90 Cap, Refills: 0      carvedilol (COREG) 6.25 mg tablet Take 1 Tab by mouth two (2) times daily (with meals). Qty: 60 Tab, Refills: 0      insulin glargine (LANTUS,BASAGLAR) 100 unit/mL (3 mL) inpn 35 Units by SubCUTAneous route nightly. Qty: 4 Pen, Refills: 0      insulin lispro (HUMALOG) 100 unit/mL kwikpen 10 Units by SubCUTAneous route Before breakfast, lunch, and dinner. Qty: 3 Pen, Refills: 0      predniSONE (DELTASONE) 10 mg tablet Take 10 mg by mouth See Admin Instructions. Take 4 pills by mouth for 3 days, 3 pills for 3 days, 2 pills for 3 days, 1 pill for 3 days then stop  Qty: 30 Tab, Refills: 0      azithromycin (ZITHROMAX) 500 mg tab Take 1 Tab by mouth daily. Qty: 1 Tab, Refills: 0         CONTINUE these medications which have NOT CHANGED    Details   albuterol (VENTOLIN HFA) 90 mcg/actuation inhaler Take 2 Puffs by inhalation every six (6) hours as needed for Wheezing. allopurinol (ZYLOPRIM) 100 mg tablet Take 100 mg by mouth daily. amiodarone (CORDARONE) 200 mg tablet Take 200 mg by mouth daily. apixaban (ELIQUIS) 2.5 mg tablet Take 2.5 mg by mouth two (2) times a day. aspirin delayed-release 81 mg tablet Take 81 mg by mouth daily. cetirizine (ZYRTEC) 10 mg tablet Take 10 mg by mouth daily. ferrous sulfate 325 mg (65 mg iron) tablet Take 325 mg by mouth Daily (before breakfast). fluticasone (FLONASE ALLERGY RELIEF) 50 mcg/actuation nasal spray 2 Sprays by Both Nostrils route daily.       fluticasone-vilanterol (BREO ELLIPTA) 100-25 mcg/dose inhaler Take 1 Puff by inhalation daily. HYDROcodone-acetaminophen (NORCO)  mg tablet Take 1 Tab by mouth every eight (8) hours as needed for Pain. nitroglycerin (NITROSTAT) 0.4 mg SL tablet 0.4 mg by SubLINGual route every five (5) minutes as needed for Chest Pain. Up to 3 doses. traMADol (ULTRAM) 50 mg tablet Take 50 mg by mouth every eight (8) hours as needed for Pain.      umeclidinium (INCRUSE ELLIPTA) 62.5 mcg/actuation inhaler Take 1 Puff by inhalation daily. cholecalciferol (VITAMIN D3) 1,000 unit cap Take 1,000 Units by mouth daily. albuterol (PROVENTIL VENTOLIN) 2.5 mg /3 mL (0.083 %) nebulizer solution 2.5 mg by Nebulization route every six (6) hours as needed. LORazepam (ATIVAN) 1 mg tablet 0.5 mg three (3) times daily as needed for Anxiety. montelukast (SINGULAIR) 10 mg tablet Take 10 mg by mouth daily. furosemide (LASIX) 40 mg tablet Take 40 mg by mouth daily. verapamil SR (CALAN-SR) 240 mg CR tablet Take 120 mg by mouth nightly. Takes 1/2 tab      atorvastatin (LIPITOR) 40 mg tablet Take 40 mg by mouth daily. Omeprazole delayed release (PRILOSEC D/R) 20 mg tablet Take 20 mg by mouth daily. potassium chloride SA (MICRO-K) 10 mEq capsule Take 10 mEq by mouth daily. STOP taking these medications       glimepiride (AMARYL) 1 mg tablet Comments:   Reason for Stopping:                 Disposition: home with home health. Activity: Activity as tolerated  Diet: DIET DIABETIC CONSISTENT CARB Regular  DIET NUTRITIONAL SUPPLEMENTS All Meals; Nepro    Follow-up Appointments   Procedures    FOLLOW UP VISIT Appointment in: One Week Please schedule office visit with Dr. Remer Closs in one week - Thank you! Please schedule office visit with Dr. Remer Closs in one week - Thank you! Standing Status:   Standing     Number of Occurrences:   1     Order Specific Question:   Appointment in     Answer:    One Week         Follow-up Information     Follow up With Specialties Details Why Kody Guerin MD Family Practice On 2/15/2019 10:50 AM. Please arrive by 10:35. Call to reschedule if need be. Hospital follow up, on insulin and prednisone. [address] [facility]            Time spent in patient discharge planning and coordination 35 minutes.     Signed:  Danyel Cole MD

## 2019-02-13 NOTE — PROGRESS NOTES
[facility]. Justin Sanderson

## 2019-02-13 NOTE — PROGRESS NOTES
Assessment completed and documented. Dyspnea with exertion. Lung sounds diminished. BP elevated. Edema to BLE. Abdomen soft. Bowel sounds active. Voiding without difficulty. No complaints of pain. Currently resting in bed. Will continue to monitor with hourly rounding.

## 2019-02-13 NOTE — PROGRESS NOTES
RENAL PROGRESS NOTE Subjective:  
 
 
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 Trinh Roca and was admitted 4 days ago with marked dyspnea. She has been diuresed and is breathing better, but renal function indices have worsened for which reason consult was called today. 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 She has been treated with EPO in the past, but this was placed on hold due to high co-pay expenses. She was hospitalized for pneumonia in 11/2018 which was associated with worsened renal function. Discussion of renal failure options suggested that she desired peritoneal dialysis, especially as she lives in [place] which is far away from a dialysis unit. She had an AV graft placed due to history of prior abdominal surgeries on 1/31/2019 and was admitted 4 days ago with dyspnea. Dyspnea improved with aggressive diuresis, but creatinine and BUN are increasing. No overt uremic symptoms. No bad taste in mouth, no N/V, no itching. 2/11/19 - feels a little better today - declines IV iron as in the past she had vomiting with IV iron - wishes to continue PO iron - finally having a BM - wondering about CPAP which she had trouble with in the past - no overt uremic symptoms 2/12/19 - feels a little depressed - discussed rise in BUN which is mainly due to steroids - no uremic symptoms - O2 down to 3L NC. Appetite is ok, no N/V.  
2/13/19 - she wishes to go home - no N/V, dyspnea is well controlled, no uremic symptoms - plan office visit in one week Past Medical History:  
Diagnosis Date  Adverse effect of anesthesia Combative  Anemia  Anxiety  Asthma  Atrial flutter ([facility] [address] Takes Eliquis  CAD (coronary artery disease) 2013 CABG x4  
 Cancer ([facility] [address] Uterine  Chronic kidney disease  Chronic obstructive pulmonary disease (HCC)   
 wears oxygen 2.5 liters continuous  Chronic pain Lower back  Constipation  DDD (degenerative disc disease), lumbar  Depression  Fatty liver  GERD (gastroesophageal reflux disease)  Heart abnormality  Heart failure ([facility] [address]  Hypercholesterolemia  Hypertension  Kidney disease  Morbid obesity ([facility] [address]  Nausea & vomiting  Osteoarthritis  PUD (peptic ulcer disease)  Sleep apnea   
 no CPAP, wears 2 liters oxygen at bedtime  Type 2 diabetes mellitus ([facility] [address] A1C 7.1 on 9/2018, , Low 80's,  Vitamin B12 deficiency Past Surgical History:  
Procedure Laterality Date  CABG, ARTERY-VEIN, FOUR  2013  HX APPENDECTOMY  HX COLONOSCOPY    
 HX GYN    
 ectopic pregnancy [address] CATHETERIZATION  2013  HX HYSTERECTOMY  HX OTHER SURGICAL  1992  
 neck spurs  HX TONSILLECTOMY  NEUROLOGICAL PROCEDURE UNLISTED    
 diskectomy  VASCULAR SURGERY PROCEDURE UNLIST Right   
 right carotidendarectomy Prior to Admission medications Medication Sig Start Date End Date Taking? Authorizing Provider  
albuterol (VENTOLIN HFA) 90 mcg/actuation inhaler Take 2 Puffs by inhalation every six (6) hours as needed for Wheezing. Provider, Historical  
allopurinol (ZYLOPRIM) 100 mg tablet Take 100 mg by mouth daily. Provider, Historical  
amiodarone (CORDARONE) 200 mg tablet Take 200 mg by mouth daily. Provider, Historical  
apixaban (ELIQUIS) 2.5 mg tablet Take 2.5 mg by mouth two (2) times a day. Provider, Historical  
aspirin delayed-release 81 mg tablet Take 81 mg by mouth daily. Provider, Historical  
cetirizine (ZYRTEC) 10 mg tablet Take 10 mg by mouth daily.     Provider, Historical  
 ferrous sulfate 325 mg (65 mg iron) tablet Take 325 mg by mouth Daily (before breakfast). Provider, Historical  
fluticasone (FLONASE ALLERGY RELIEF) 50 mcg/actuation nasal spray 2 Sprays by Both Nostrils route daily. Provider, Historical  
fluticasone-vilanterol (BREO ELLIPTA) 100-25 mcg/dose inhaler Take 1 Puff by inhalation daily. Provider, Historical  
HYDROcodone-acetaminophen (NORCO)  mg tablet Take 1 Tab by mouth every eight (8) hours as needed for Pain. Provider, Historical  
nitroglycerin (NITROSTAT) 0.4 mg SL tablet 0.4 mg by SubLINGual route every five (5) minutes as needed for Chest Pain. Up to 3 doses. Provider, Historical  
traMADol (ULTRAM) 50 mg tablet Take 50 mg by mouth every eight (8) hours as needed for Pain. Provider, Historical  
umeclidinium (INCRUSE ELLIPTA) 62.5 mcg/actuation inhaler Take 1 Puff by inhalation daily. Provider, Historical  
cholecalciferol (VITAMIN D3) 1,000 unit cap Take 1,000 Units by mouth daily. Provider, Historical  
glimepiride (AMARYL) 1 mg tablet Take 1 mg by mouth Daily (before breakfast). Provider, Historical  
albuterol (PROVENTIL VENTOLIN) 2.5 mg /3 mL (0.083 %) nebulizer solution 2.5 mg by Nebulization route every six (6) hours as needed. 3/2/15   Provider, Historical  
LORazepam (ATIVAN) 1 mg tablet 0.5 mg three (3) times daily as needed for Anxiety. 3/24/15   Provider, Historical  
montelukast (SINGULAIR) 10 mg tablet Take 10 mg by mouth daily. 1/8/15   Provider, Historical  
furosemide (LASIX) 40 mg tablet Take 40 mg by mouth daily. Provider, Historical  
verapamil SR (CALAN-SR) 240 mg CR tablet Take 120 mg by mouth nightly. Takes 1/2 tab    Provider, Historical  
atorvastatin (LIPITOR) 40 mg tablet Take 40 mg by mouth daily. Provider, Historical  
Omeprazole delayed release (PRILOSEC D/R) 20 mg tablet Take 20 mg by mouth daily.     Provider, Historical  
 potassium chloride SA (MICRO-K) 10 mEq capsule Take 10 mEq by mouth daily. Provider, Historical  
 
Allergies Allergen Reactions  Alprazolam Drowsiness  Demerol [Meperidine] Unknown (comments)  Levaquin [Levofloxacin] Rash  Oxycodone Unknown (comments) and Nausea and Vomiting  Pepcid [Famotidine] Nausea and Vomiting  Phenergan [Promethazine] Unknown (comments) and Other (comments) Turns red and swells  Sitagliptin Unknown (comments)  Sulfa (Sulfonamide Antibiotics) Nausea and Vomiting Social History Tobacco Use  Smoking status: Former Smoker Packs/day: 1.00 Years: 40.00 Pack years: 40.00 Last attempt to quit:  Years since quittin.1  Smokeless tobacco: Never Used Substance Use Topics  Alcohol use: No  
  
Family History Problem Relation Age of Onset  Cancer Mother   
     breast  
 Diabetes Mother  Heart Disease Mother  Diabetes Father  Heart Disease Father Review of Systems Constitutional: no fever, Eyes: fair vision Ears, nose, mouth, throat, and face:fair hearing, Respiratory: see HPI Cardiovascular:no chest pain,   
Gastrointestinal:no diarrhea,  
Genitourinary: no dysuria, Hematologic/lymphatic: no bleeding tendency, Neurological: no seizures, Behvioral/Psych: no psych hospitalization Endocrine: no goiter, The remainder is negative, except as per HPI. Objective:  
 
 
Visit Vitals /68 Pulse 73 Temp 98.4 °F (36.9 °C) Resp 16 Ht 5' 2\" (1.575 m) Wt 110.9 kg (244 lb 6.4 oz) SpO2 95% BMI 44.70 kg/m² No intake/output data recorded.  1901 -  0700 In: -  
Out: 5577 [[id number]] General:  Alert, cooperative, mild respiratory distress on nasal O2, appears stated age. Head:  Normocephalic, without obvious abnormality, atraumatic. Eyes:  Conjunctivae/corneas clear. EOMs intact. Throat: Lips, mucosa, and tongue normal. Teeth and gums normal.  
Neck: Supple, symmetrical, trachea midline, no adenopathy,  no JVD. Lungs:   Rhonchi to auscultation bilaterally. Heart:  Regular rate and rhythm, S1, S2 normal, no murmur,  rub or gallop. Abdomen:   Soft, non-tender. No masses,  No organomegaly. Extremities: Extremities normal, atraumatic, no cyanosis, trace to + edema. Access: LLA graft is open and examines well Skin: Skin color, texture, turgor normal. No rashes or lesions. Neurologic: Grossly intact. No asterixis. Data Review:  
 
Results for Hoa Campa (MRN [id number]) as of 2/12/2019 19:11 Ref. Range 2/7/2019 05:56 2/8/2019 05:40 2/9/2019 02:33 2/10/2019 05:04 2/12/2019 06:25 Sodium Latest Ref Range: 136 - 145 mmol/L 140 139 136 138 137 Potassium Latest Ref Range: 3.5 - 5.1 mmol/L 4.4 4.8 4.7 4.1 4.1 Chloride Latest Ref Range: 98 - 107 mmol/L 103 102 99 98 97 (L)  
CO2 Latest Ref Range: 21 - 32 mmol/L 26 26 25 26 30 Anion gap Latest Units: mmol/L 11 11 12 14 10 Glucose Latest Ref Range: 65 - 100 mg/dL 236 (H) 316 (H) 264 (H) 247 (H) 180 (H) BUN Latest Ref Range: 8 - 23 MG/DL 66 (H) 82 (H) 89 (H) 105 (H) 123 (H) Creatinine Latest Ref Range: 0.6 - 1.0 MG/DL 3.30 (H) 3.84 (H) 3.87 (H) 3.80 (H) 3.71 (H) Calcium Latest Ref Range: 8.3 - 10.4 MG/DL 8.3 8.3 8.6 8.5 9.1 Phosphorus Latest Ref Range: 2.3 - 3.7 MG/DL  5.3 (H) Magnesium Latest Ref Range: 1.8 - 2.4 mg/dL   2.8 (H)  2.8 (H) GFR est non-AA Latest Units: ml/min/1.73m2 14 12 12 12 12 GFR est AA Latest Ref Range: >60 ml/min/1.73m2 17 (L) 14 (L) 14 (L) 15 (L) 15 (L) Bilirubin, total Latest Ref Range: 0.2 - 1.1 MG/DL  0.3 0.4 0.4 0.5 Protein, total Latest Units: g/dL  6.5 7.0 6.8 6.8 Albumin Latest Ref Range: 3.2 - 4.6 g/dL  2.8 (L) 3.1 (L) 3.0 (L) 3.2 Globulin Latest Ref Range: 2.3 - 3.5 g/dL  3.7 (H) 3.9 (H) 3.8 (H) 3.6 (H) A-G Ratio Latest Units:    0.8 0.8 0.8 0.9 ALT (SGPT) Latest Ref Range: 12 - 65 U/L  98 (H) 89 (H) 73 (H) 58 AST Latest Ref Range: 15 - 37 U/L  92 (H) 44 (H) 32 22 Alk. phosphatase Latest Ref Range: 50 - 136 U/L  126 127 114 100 Recent Results (from the past 24 hour(s)) GLUCOSE, POC Collection Time: 02/12/19  8:26 AM  
Result Value Ref Range Glucose (POC) 221 (H) 65 - 100 mg/dL GLUCOSE, POC Collection Time: 02/12/19 11:07 AM  
Result Value Ref Range Glucose (POC) 239 (H) 65 - 100 mg/dL GLUCOSE, POC Collection Time: 02/12/19  3:57 PM  
Result Value Ref Range Glucose (POC) 213 (H) 65 - 100 mg/dL GLUCOSE, POC Collection Time: 02/12/19  9:16 PM  
Result Value Ref Range Glucose (POC) 167 (H) 65 - 100 mg/dL Results for Sydney Rebolledo (MRN [id number]) as of 2/10/2019 17:56 Ref. Range 2/6/2019 10:18 Iron Latest Units: ug/dL 13 TIBC Latest Ref Range: 250 - 450 ug/dL 292 Transferrin Saturation Latest Units: % 4 Transferrin Latest Ref Range: 202 - 364 mg/dL 226 Ferritin Latest Ref Range: 8 - 388 NG/ML 94 CXR viewed by me - some fluid excess and CM IMPRESSION:  Borderline cardiomegaly with some atelectasis or infiltrate right base. ECHO 2/11/19 - SUMMARY: 
-  Left ventricle: Size was at the upper limits of normal. Systolic function 
was normal. Ejection fraction was estimated in the range of 55 % to 60 %. Therewere no regional wall motion abnormalities. There was mild concentric 
hypertrophy. The E/e' ratio was 28.5. There was diastolic dysfunction. -  Left atrium: The atrium was mildly dilated. -  Right atrium: The atrium was mildly dilated. -  Inferior vena cava, hepatic veins: The inferior vena cava was mildly 
dilated. The respirophasic change in diameter was more than 50%. -  Mitral valve: There was mild annular calcification. There was mild to 
moderate regurgitation. -  Tricuspid valve: There was mild regurgitation. Principal Problem: Acute on chronic respiratory failure with hypoxia ([facility] [address] (2/6/2019) Active Problems: 
  Severe obesity ([facility] [address] (1/24/2019) Stage 4 chronic kidney disease ([facility] [address] (1/24/2019) Cellulitis (2/6/2019) COPD exacerbation ([address] (2/6/2019) Anemia (2/6/2019) Pulmonary infiltrate in right lung on chest x-ray (2/6/2019) WILLIAM (obstructive sleep apnea) (2/7/2019) Overview: Not using CPAP See [facility]. Nakia Am[facility] at [facility] Uncontrolled type 2 diabetes mellitus with hyperglycemia ([facility] [address] (2/8/2019) Anemia of renal disease (2/8/2019) Assessment: 1. CKD stage 4 to 5 with ELBA  
- CKD due to likely underlying HTN/vascular disease - ELBA due to CHF/diuresis  
- BUN is increased out of proportion also due to steroids 
- no urgent dialysis needed 
- will plan to follow as outpatient on a weekly basis 2. CHF/fluid overload - 
- not sure if increased demand from supporting AV graft may be contributing to decompensation 
- ECHO shows adequate EF, mild/mod TR 
- continue Lasix diuresis for now at reduced dose 
- seems improved in response to switch to Carvedilol and stopping Diltiazem 3. Anemia with iron deficiency - 
- held epo shots at the request of the patient d/t expense, but she required transfusion in the past 
- she declines IV iron, treat with intensified PO iron 
- no transfusion needed at this time 4. malnutrition - 
- add supplements 5. HTN - 
- switch off Diltiazem to Carvedilol due to negative inotrope effect of Ca++blocker 
- monitor closely for adverse respiratory effect from carvedilol 6. secondary hyperparathyroidism - 
- continue Vit D and calcitriol 7. COPD  
- continue current respiratory treatments 
- management per pulmonary service 8. Pulmonary infiltrate - 
- fluid overload is more likely in my opinion than pneumonia 9. DM II - 
- fair control 10. Hyperphosphatemia - 
- on calcium acetate for phosphorus binder 11.  Elevated LFTs - 
 - improving with control of fluid overload 
- suggestive of passive liver congestion from CHF, possibly from TR per ECHO report Plan: As above - for office f/u in one week Lilly Delarosa M.D.

## 2019-02-13 NOTE — PROGRESS NOTES
RENAL PROGRESS NOTE Subjective:  
 
 
Cc - shortness of breath 83 y/o 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 Anushka Navarro and was admitted 4 days ago with marked dyspnea. She has been diuresed and is breathing better, but renal function indices have worsened for which reason consult was called today. 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 She has been treated with EPO in the past, but this was placed on hold due to high co-pay expenses. She was hospitalized for pneumonia in 11/2018 which was associated with worsened renal function. Discussion of renal failure options suggested that she desired peritoneal dialysis, especially as she lives in [place] which is far away from a dialysis unit. She had an AV graft placed due to history of prior abdominal surgeries on 1/31/2019 and was admitted 4 days ago with dyspnea. Dyspnea improved with aggressive diuresis, but creatinine and BUN are increasing. No overt uremic symptoms. No bad taste in mouth, no N/V, no itching. 2/11/19 - feels a little better today - declines IV iron as in the past she had vomiting with IV iron - wishes to continue PO iron - finally having a BM - wondering about CPAP which she had trouble with in the past - no overt uremic symptoms 2/12/19 - feels a little depressed - discussed rise in BUN which is mainly due to steroids - no uremic symptoms - O2 down to 3L NC. Appetite is ok, no N/V. Past Medical History:  
Diagnosis Date  Adverse effect of anesthesia Combative  Anemia  Anxiety  Asthma  Atrial flutter ([facility] [address] [address] Takes Eliquis  CAD (coronary artery disease) 2013 CABG x4  
 Cancer ([facility] [address] Uterine  Chronic kidney disease  Chronic obstructive pulmonary disease (HCC)   
 wears oxygen 2.5 liters continuous  Chronic pain Lower back  Constipation  DDD (degenerative disc disease), lumbar  Depression  Fatty liver  GERD (gastroesophageal reflux disease)  Heart abnormality  Heart failure ([address]  Hypercholesterolemia  Hypertension  Kidney disease  Morbid obesity ([facility] [address]  Nausea & vomiting  Osteoarthritis  PUD (peptic ulcer disease)  Sleep apnea   
 no CPAP, wears 2 liters oxygen at bedtime  Type 2 diabetes mellitus ([facility] [address] A1C 7.1 on 9/2018, , Low 80's,  Vitamin B12 deficiency Past Surgical History:  
Procedure Laterality Date  CABG, ARTERY-VEIN, FOUR  2013  HX APPENDECTOMY  HX COLONOSCOPY    
 HX GYN    
 ectopic pregnancy [address] CATHETERIZATION  2013  HX HYSTERECTOMY  HX OTHER SURGICAL  1992  
 neck spurs  HX TONSILLECTOMY  NEUROLOGICAL PROCEDURE UNLISTED    
 diskectomy  VASCULAR SURGERY PROCEDURE UNLIST Right   
 right carotidendarectomy Prior to Admission medications Medication Sig Start Date End Date Taking? Authorizing Provider  
albuterol (VENTOLIN HFA) 90 mcg/actuation inhaler Take 2 Puffs by inhalation every six (6) hours as needed for Wheezing. Provider, Historical  
allopurinol (ZYLOPRIM) 100 mg tablet Take 100 mg by mouth daily. Provider, Historical  
amiodarone (CORDARONE) 200 mg tablet Take 200 mg by mouth daily. Provider, Historical  
apixaban (ELIQUIS) 2.5 mg tablet Take 2.5 mg by mouth two (2) times a day. Provider, Historical  
aspirin delayed-release 81 mg tablet Take 81 mg by mouth daily. Provider, Historical  
cetirizine (ZYRTEC) 10 mg tablet Take 10 mg by mouth daily. Provider, Historical  
ferrous sulfate 325 mg (65 mg iron) tablet Take 325 mg by mouth Daily (before breakfast).     Provider, Historical  
 fluticasone (FLONASE ALLERGY RELIEF) 50 mcg/actuation nasal spray 2 Sprays by Both Nostrils route daily. Provider, Historical  
fluticasone-vilanterol (BREO ELLIPTA) 100-25 mcg/dose inhaler Take 1 Puff by inhalation daily. Provider, Historical  
HYDROcodone-acetaminophen (NORCO)  mg tablet Take 1 Tab by mouth every eight (8) hours as needed for Pain. Provider, Historical  
nitroglycerin (NITROSTAT) 0.4 mg SL tablet 0.4 mg by SubLINGual route every five (5) minutes as needed for Chest Pain. Up to 3 doses. Provider, Historical  
traMADol (ULTRAM) 50 mg tablet Take 50 mg by mouth every eight (8) hours as needed for Pain. Provider, Historical  
umeclidinium (INCRUSE ELLIPTA) 62.5 mcg/actuation inhaler Take 1 Puff by inhalation daily. Provider, Historical  
cholecalciferol (VITAMIN D3) 1,000 unit cap Take 1,000 Units by mouth daily. Provider, Historical  
glimepiride (AMARYL) 1 mg tablet Take 1 mg by mouth Daily (before breakfast). Provider, Historical  
albuterol (PROVENTIL VENTOLIN) 2.5 mg /3 mL (0.083 %) nebulizer solution 2.5 mg by Nebulization route every six (6) hours as needed. 3/2/15   Provider, Historical  
LORazepam (ATIVAN) 1 mg tablet 0.5 mg three (3) times daily as needed for Anxiety. 3/24/15   Provider, Historical  
montelukast (SINGULAIR) 10 mg tablet Take 10 mg by mouth daily. 1/8/15   Provider, Historical  
furosemide (LASIX) 40 mg tablet Take 40 mg by mouth daily. Provider, Historical  
verapamil SR (CALAN-SR) 240 mg CR tablet Take 120 mg by mouth nightly. Takes 1/2 tab    Provider, Historical  
atorvastatin (LIPITOR) 40 mg tablet Take 40 mg by mouth daily. Provider, Historical  
Omeprazole delayed release (PRILOSEC D/R) 20 mg tablet Take 20 mg by mouth daily. Provider, Historical  
potassium chloride SA (MICRO-K) 10 mEq capsule Take 10 mEq by mouth daily. Provider, Historical  
 
Allergies Allergen Reactions  Alprazolam Drowsiness  Demerol [Meperidine] Unknown (comments)  Levaquin [Levofloxacin] Rash  Oxycodone Unknown (comments) and Nausea and Vomiting  Pepcid [Famotidine] Nausea and Vomiting  Phenergan [Promethazine] Unknown (comments) and Other (comments) Turns red and swells  Sitagliptin Unknown (comments)  Sulfa (Sulfonamide Antibiotics) Nausea and Vomiting Social History Tobacco Use  Smoking status: Former Smoker Packs/day: 1.00 Years: 40.00 Pack years: 40.00 Last attempt to quit:  Years since quittin.1  Smokeless tobacco: Never Used Substance Use Topics  Alcohol use: No  
  
Family History Problem Relation Age of Onset  Cancer Mother   
     breast  
 Diabetes Mother  Heart Disease Mother  Diabetes Father  Heart Disease Father Review of Systems Constitutional: no fever, Eyes: fair vision Ears, nose, mouth, throat, and face:fair hearing, Respiratory: see HPI Cardiovascular:no chest pain,   
Gastrointestinal:no diarrhea,  
Genitourinary: no dysuria, Hematologic/lymphatic: no bleeding tendency, Neurological: no seizures, Behvioral/Psych: no psych hospitalization Endocrine: no goiter, The remainder is negative, except as per HPI. Objective:  
 
 
Visit Vitals /68 (BP 1 Location: Right arm, BP Patient Position: At rest) Pulse 72 Temp 97.5 °F (36.4 °C) Resp 25 Ht 5' 2\" (1.575 m) Wt 110.9 kg (244 lb 6.4 oz) SpO2 91% BMI 44.70 kg/m² No intake/output data recorded.  0701 -  1900 In: -  
Out: 8280 [[id number]] General:  Alert, cooperative, mild respiratory distress on nasal O2, appears stated age. Head:  Normocephalic, without obvious abnormality, atraumatic. Eyes:  Conjunctivae/corneas clear. EOMs intact. Throat: Lips, mucosa, and tongue normal. Teeth and gums normal.  
Neck: Supple, symmetrical, trachea midline, no adenopathy,  no JVD. Lungs:   Rhonchi to auscultation bilaterally. Heart:  Regular rate and rhythm, S1, S2 normal, no murmur,  rub or gallop. Abdomen:   Soft, non-tender. No masses,  No organomegaly. Extremities: Extremities normal, atraumatic, no cyanosis, trace to + edema. Access: LLA graft is open and examines well Skin: Skin color, texture, turgor normal. No rashes or lesions. Neurologic: Grossly intact. No asterixis. Data Review:  
 
Results for Jonah Marcelo (MRN [id number]) as of 2/12/2019 19:11 Ref. Range 2/7/2019 05:56 2/8/2019 05:40 2/9/2019 02:33 2/10/2019 05:04 2/12/2019 06:25 Sodium Latest Ref Range: 136 - 145 mmol/L 140 139 136 138 137 Potassium Latest Ref Range: 3.5 - 5.1 mmol/L 4.4 4.8 4.7 4.1 4.1 Chloride Latest Ref Range: 98 - 107 mmol/L 103 102 99 98 97 (L)  
CO2 Latest Ref Range: 21 - 32 mmol/L 26 26 25 26 30 Anion gap Latest Units: mmol/L 11 11 12 14 10 Glucose Latest Ref Range: 65 - 100 mg/dL 236 (H) 316 (H) 264 (H) 247 (H) 180 (H) BUN Latest Ref Range: 8 - 23 MG/DL 66 (H) 82 (H) 89 (H) 105 (H) 123 (H) Creatinine Latest Ref Range: 0.6 - 1.0 MG/DL 3.30 (H) 3.84 (H) 3.87 (H) 3.80 (H) 3.71 (H) Calcium Latest Ref Range: 8.3 - 10.4 MG/DL 8.3 8.3 8.6 8.5 9.1 Phosphorus Latest Ref Range: 2.3 - 3.7 MG/DL  5.3 (H) Magnesium Latest Ref Range: 1.8 - 2.4 mg/dL   2.8 (H)  2.8 (H) GFR est non-AA Latest Units: ml/min/1.73m2 14 12 12 12 12 GFR est AA Latest Ref Range: >60 ml/min/1.73m2 17 (L) 14 (L) 14 (L) 15 (L) 15 (L) Bilirubin, total Latest Ref Range: 0.2 - 1.1 MG/DL  0.3 0.4 0.4 0.5 Protein, total Latest Units: g/dL  6.5 7.0 6.8 6.8 Albumin Latest Ref Range: 3.2 - 4.6 g/dL  2.8 (L) 3.1 (L) 3.0 (L) 3.2 Globulin Latest Ref Range: 2.3 - 3.5 g/dL  3.7 (H) 3.9 (H) 3.8 (H) 3.6 (H) A-G Ratio Latest Units:    0.8 0.8 0.8 0.9 ALT (SGPT) Latest Ref Range: 12 - 65 U/L  98 (H) 89 (H) 73 (H) 58 AST Latest Ref Range: 15 - 37 U/L  92 (H) 44 (H) 32 22 Alk. phosphatase Latest Ref Range: 50 - 136 U/L  126 127 114 100 Recent Results (from the past 24 hour(s)) GLUCOSE, POC Collection Time: 02/11/19  9:17 PM  
Result Value Ref Range Glucose (POC) 187 (H) 65 - 100 mg/dL METABOLIC PANEL, COMPREHENSIVE Collection Time: 02/12/19  6:25 AM  
Result Value Ref Range Sodium 137 136 - 145 mmol/L Potassium 4.1 3.5 - 5.1 mmol/L Chloride 97 (L) 98 - 107 mmol/L  
 CO2 30 21 - 32 mmol/L Anion gap 10 mmol/L Glucose 180 (H) 65 - 100 mg/dL  (H) 8 - 23 MG/DL Creatinine 3.71 (H) 0.6 - 1.0 MG/DL  
 GFR est AA 15 (L) >60 ml/min/1.73m2 GFR est non-AA 12 ml/min/1.73m2 Calcium 9.1 8.3 - 10.4 MG/DL Bilirubin, total 0.5 0.2 - 1.1 MG/DL  
 ALT (SGPT) 58 12 - 65 U/L  
 AST (SGOT) 22 15 - 37 U/L Alk. phosphatase 100 50 - 136 U/L Protein, total 6.8 g/dL Albumin 3.2 3.2 - 4.6 g/dL Globulin 3.6 (H) 2.3 - 3.5 g/dL A-G Ratio 0.9    
CBC WITH AUTOMATED DIFF Collection Time: 02/12/19  6:25 AM  
Result Value Ref Range WBC 17.3 (H) 4.3 - 11.1 K/uL  
 RBC 3.92 (L) 4.05 - 5.2 M/uL  
 HGB 11.4 (L) 11.7 - 15.4 g/dL HCT 35.3 (L) 35.8 - 46.3 % MCV 90.1 79.6 - 97.8 FL  
 MCH 29.1 26.1 - 32.9 PG  
 MCHC 32.3 31.4 - 35.0 g/dL  
 RDW 15.9 (H) 11.9 - 14.6 % PLATELET 557 989 - 196 K/uL MPV 11.2 9.4 - 12.3 FL ABSOLUTE NRBC 0.00 0.0 - 0.2 K/uL  
 DF AUTOMATED NEUTROPHILS 88 (H) 43 - 78 % LYMPHOCYTES 7 (L) 13 - 44 % MONOCYTES 3 (L) 4.0 - 12.0 % EOSINOPHILS 0 (L) 0.5 - 7.8 % BASOPHILS 0 0.0 - 2.0 % IMMATURE GRANULOCYTES 2 0.0 - 5.0 %  
 ABS. NEUTROPHILS 15.2 (H) 1.7 - 8.2 K/UL  
 ABS. LYMPHOCYTES 1.3 0.5 - 4.6 K/UL  
 ABS. MONOCYTES 0.5 0.1 - 1.3 K/UL  
 ABS. EOSINOPHILS 0.0 0.0 - 0.8 K/UL  
 ABS. BASOPHILS 0.1 0.0 - 0.2 K/UL  
 ABS. IMM. GRANS. 0.3 0.0 - 0.5 K/UL MAGNESIUM Collection Time: 02/12/19  6:25 AM  
Result Value Ref Range Magnesium 2.8 (H) 1.8 - 2.4 mg/dL GLUCOSE, POC  
 Collection Time: 02/12/19  6:59 AM  
Result Value Ref Range Glucose (POC) 187 (H) 65 - 100 mg/dL GLUCOSE, POC Collection Time: 02/12/19  8:26 AM  
Result Value Ref Range Glucose (POC) 221 (H) 65 - 100 mg/dL GLUCOSE, POC Collection Time: 02/12/19 11:07 AM  
Result Value Ref Range Glucose (POC) 239 (H) 65 - 100 mg/dL GLUCOSE, POC Collection Time: 02/12/19  3:57 PM  
Result Value Ref Range Glucose (POC) 213 (H) 65 - 100 mg/dL Results for Aron Medellin (MRN [id number]) as of 2/10/2019 17:56 Ref. Range 2/6/2019 10:18 Iron Latest Units: ug/dL 13 TIBC Latest Ref Range: 250 - 450 ug/dL 292 Transferrin Saturation Latest Units: % 4 Transferrin Latest Ref Range: 202 - 364 mg/dL 226 Ferritin Latest Ref Range: 8 - 388 NG/ML 94 CXR viewed by me - some fluid excess and CM IMPRESSION:  Borderline cardiomegaly with some atelectasis or infiltrate right base. ECHO 2/11/19 - SUMMARY: 
-  Left ventricle: Size was at the upper limits of normal. Systolic function 
was normal. Ejection fraction was estimated in the range of 55 % to 60 %. Therewere no regional wall motion abnormalities. There was mild concentric 
hypertrophy. The E/e' ratio was 28.5. There was diastolic dysfunction. -  Left atrium: The atrium was mildly dilated. -  Right atrium: The atrium was mildly dilated. -  Inferior vena cava, hepatic veins: The inferior vena cava was mildly 
dilated. The respirophasic change in diameter was more than 50%. -  Mitral valve: There was mild annular calcification. There was mild to 
moderate regurgitation. -  Tricuspid valve: There was mild regurgitation. Principal Problem: 
  Acute on chronic respiratory failure with hypoxia ([address] (2/6/2019) Active Problems: 
  Severe obesity ([address] (1/24/2019) Stage 4 chronic kidney disease ([address] (1/24/2019) Cellulitis (2/6/2019) COPD exacerbation ([address] (2/6/2019) Anemia (2/6/2019) Pulmonary infiltrate in right lung on chest x-ray (2/6/2019) WILLIAM (obstructive sleep apnea) (2/7/2019) Overview: Not using CPAP See Dr. Leelee Teresa at [facility] Uncontrolled type 2 diabetes mellitus with hyperglycemia ([facility] [address] (2/8/2019) Anemia of renal disease (2/8/2019) Assessment: 1. CKD stage 4 to 5 with ELBA  
- CKD due to likely underlying HTN/vascular disease - ELBA due to CHF/diuresis  
- BUN is increased out of proportion also due to steroids 
- no urgent dialysis needed 
- will plan to follow as outpatient on a weekly basis 2. CHF/fluid overload - 
- not sure if increased demand from supporting AV graft may be contributing to decompensation 
- ECHO shows adequate EF, mild/mod TR 
- continue Lasix diuresis for now at reduced dose 
- seems improved in response to switch to Carvedilol and stopping Diltiazem 3. Anemia with iron deficiency - 
- held epo shots at the request of the patient d/t expense, but she required transfusion in the past 
- she declines IV iron, treat with intensified PO iron 
- no transfusion needed at this time 4. malnutrition - 
- add supplements 5. HTN - 
- switch off Diltiazem to Carvedilol due to negative inotrope effect of Ca++blocker 
- monitor closely for adverse respiratory effect from carvedilol 6. secondary hyperparathyroidism - 
- continue Vit D and calcitriol 7. COPD  
- continue current respiratory treatments 
- management per pulmonary service 8. Pulmonary infiltrate - 
- fluid overload is more likely in my opinion than pneumonia 9. DM II - 
- fair control 10. Hyperphosphatemia - 
- on calcium acetate for phosphorus binder 11. Elevated LFTs - 
- improving with control of fluid overload 
- suggestive of passive liver congestion from CHF, possibly from TR per ECHO report Plan: As above -  
 
Meri García M.D.

## 2019-02-14 ENCOUNTER — PATIENT OUTREACH (OUTPATIENT)
Dept: CASE MANAGEMENT | Age: 84
End: 2019-02-14

## 2019-02-14 NOTE — PROGRESS NOTES
This note will not be viewable in [address] Transition of Care Discharge Follow-up Questionnaire Date/Time of Call: 
 2/14/19 11:23am  
What was the patient hospitalized for? Acute on chronic respiratory failure with hypoxia Does the patient understand his/her diagnosis and/or treatment and what happened during the hospitalization? Yes, spoke with patient, she states understanding of diagnosis and treatment; and is agreeable to call. Patient stated, I think everything is ok Did the patient receive discharge instructions? Yes   
CM Assessed Risk for Readmission:  
 
 
Patient stated Risk for Readmission: Low/moderate r/t diagnosis and/or comorbidities None stated Review any discharge instructions (see discharge instructions/AVS in [facility]). Ask patient if they understand these. Do they have any questions? Reviewed, understanding is stated, no questions at this time Were home services ordered (nursing, PT, OT, ST, etc.)? Yes, Interim HH resume If so, has the first visit occurred? If not, why? (Assist with coordination of services if necessary.) Scheduled for today Was any DME ordered? No 
Patient has home O2; rollator If so, has it been received? If not, why?  (Assist patient in obtaining DME orders &/or equipment if necessary.) N/A Complete a review of all medications (new, continued and discontinued meds per the D/C instructions and medication tab in [address] [place] [address]). Completed START taking: 
azithromycin 500 mg Tab (ZITHROMAX) 
calcium acetate 667 mg Cap (PHOSLO) 
carvedilol 6.25 mg tablet (COREG) insulin glargine 100 unit/mL (3 mL) Inpn (LANTUS,BASAGLAR) 
insulin lispro 100 unit/mL kwikpen (HUMALOG) predniSONE 10 mg tablet (DELTASONE) STOP taking: 
glimepiride 1 mg tablet (AMARYL) Were all new prescriptions filled?   If not, why?  (Assist patient in obtaining medications if necessary  escalate for CCM &/or SW if ongoing issues are verbalized by pt or anticipated) Yes Does the patient understand the purpose and dosing instructions for all medications? (If patient has questions, provide explanation and education.) Yes Does the patient have any problems in performing ADLs? (If patient is unable to perform ADLs  what is the limiting factor(s)? Do they have a support system that can assist? If no support system is present, discuss possible assistance that they may be able to obtain. Escalate for CCM/SW if ongoing issues are verbalized by pt or anticipated) Requires assistance with ADLs Does the patient have all follow-up appointments scheduled? 7 day f/up with PCP?  
(f/up with PCP may be w/in 14 days if patient has a f/up with their specialist w/in 7 days) 7-14 day f/up with specialist?  
(or per discharge instructions) If f/up has not been made  what actions has the care coordinator made to accomplish this? Has transportation been arranged? Yes Dr. Rosen Courser 2/15/19 Dr. Coco Trevino, nephrology, 2/20/19 Yes, no transportation needs were identified at this time Any other questions or concerns expressed by the patient? No other needs or concerns identified. Patient states her gratitude for follow up. Contact information for [facility] was given, instructed to call with new questions or concerns. Schedule next appointment with MARKUS CLEMENTE Coordinator or refer to RN Case Manager/ per the workflow guidelines. When is care coordinators next follow-up call scheduled? If referred for CCM  what RN care manager was the referral assigned? Community Care Coordinator will follow per workflow guidelines. Within 30 days MARLINE Call Completed By: Harrpeet Elizabeth LPN Community Care Coordinator

## 2019-03-07 ENCOUNTER — PATIENT OUTREACH (OUTPATIENT)
Dept: CASE MANAGEMENT | Age: 84
End: 2019-03-07

## 2019-03-07 NOTE — PROGRESS NOTES
This note will not be viewable in [address] Attempted outreach follow up, was informed patient is , that she had passed away yesterday.

## 2022-12-06 NOTE — PROGRESS NOTES
Awoke easily with no c/o, requesting laxative during bedside report given to Ridge Tony RN, to address. No distress. Patient care assumed, assessment completed as charted. Patient resting in bed, in no apparent distress. Cardene drip infusing at 7.5mg/hr through intact PIV. States no needs at this time. Will monitor.

## (undated) DEVICE — SUTURE PROL SZ 7-0 L24IN NONABSORBABLE BLU L9.3MM BV-1 3/8 M8702

## (undated) DEVICE — GOWN,SIRUS,POLYRNF,BRTHSLV,XL,30/CS: Brand: MEDLINE

## (undated) DEVICE — (D)PREP SKN CHLRAPRP APPL 26ML -- CONVERT TO ITEM 371833

## (undated) DEVICE — GOWN,PREVENTION PLUS,2XL,ST,22/CS: Brand: MEDLINE

## (undated) DEVICE — 3M™ IOBAN™ 2 ANTIMICROBIAL INCISE DRAPE 6650EZ: Brand: IOBAN™ 2

## (undated) DEVICE — SUTURE PERMAHAND SZ 2-0 L12X18IN NONABSORBABLE BLK SILK A185H

## (undated) DEVICE — Device

## (undated) DEVICE — SUTURE PROL SZ 6-0 L24IN NONABSORBABLE BLU BV-1 L9.3MM 3/8 M8805

## (undated) DEVICE — BUTTON SWITCH PENCIL BLADE ELECTRODE, HOLSTER: Brand: EDGE

## (undated) DEVICE — CARDINAL HEALTH FLEXIBLE LIGHT HANDLE COVER: Brand: CARDINAL HEALTH

## (undated) DEVICE — SUTURE PERMAHAND SZ 3-0 L18IN NONABSORBABLE BLK SILK BRAID A184H

## (undated) DEVICE — SUTURE PROL SZ 5-0 L24IN NONABSORBABLE BLU L13MM C-1 3/8 M8725

## (undated) DEVICE — DISH PTRI STRL --

## (undated) DEVICE — SUTURE VCRL SZ 3-0 L27IN ABSRB UD L26MM SH 1/2 CIR J416H

## (undated) DEVICE — SUTURE VCRL SZ 4-0 L27IN ABSRB UD L19MM PS-2 3/8 CIR PRIM J426H

## (undated) DEVICE — GEL US 20GM NONIRRITATING OVERWRAPPED FILE PCH TRNSMIT

## (undated) DEVICE — 2000CC GUARDIAN II: Brand: GUARDIAN

## (undated) DEVICE — INTENDED TO BE USED TO OCCLUDE, RETRACT AND IDENTIFY ARTERIES, VEINS, TENDONS AND NERVES IN SURGICAL PROCEDURES: Brand: STERION®  VESSEL LOOP

## (undated) DEVICE — INTENDED FOR TISSUE SEPARATION, AND OTHER PROCEDURES THAT REQUIRE A SHARP SURGICAL BLADE TO PUNCTURE OR CUT.: Brand: BARD-PARKER SAFETY BLADES SIZE 11, STERILE

## (undated) DEVICE — DRAPE TWL SURG 16X26IN BLU ORB04] ALLCARE INC]

## (undated) DEVICE — DRAPE SHT 3 QTR PROXIMA 53X77 --

## (undated) DEVICE — REM POLYHESIVE ADULT PATIENT RETURN ELECTRODE: Brand: VALLEYLAB

## (undated) DEVICE — INTENDED FOR TISSUE SEPARATION, AND OTHER PROCEDURES THAT REQUIRE A SHARP SURGICAL BLADE TO PUNCTURE OR CUT.: Brand: BARD-PARKER SAFETY BLADES SIZE 15, STERILE

## (undated) DEVICE — UNIVERSAL DRAPES: Brand: MEDLINE INDUSTRIES, INC.

## (undated) DEVICE — VASCUCLAMP RADIOPAQUE VASCULAR CLAMP SMALL STRAIGHT: Brand: VASCUCLAMP